# Patient Record
Sex: FEMALE | Race: BLACK OR AFRICAN AMERICAN | NOT HISPANIC OR LATINO
[De-identification: names, ages, dates, MRNs, and addresses within clinical notes are randomized per-mention and may not be internally consistent; named-entity substitution may affect disease eponyms.]

---

## 2017-03-31 ENCOUNTER — TRANSCRIPTION ENCOUNTER (OUTPATIENT)
Age: 27
End: 2017-03-31

## 2019-06-01 ENCOUNTER — OUTPATIENT (OUTPATIENT)
Dept: OUTPATIENT SERVICES | Facility: HOSPITAL | Age: 29
LOS: 1 days | End: 2019-06-01
Payer: MEDICAID

## 2019-06-01 PROCEDURE — G9001: CPT

## 2019-06-03 ENCOUNTER — EMERGENCY (EMERGENCY)
Facility: HOSPITAL | Age: 29
LOS: 1 days | Discharge: HOME | End: 2019-06-03
Attending: EMERGENCY MEDICINE
Payer: MEDICAID

## 2019-06-03 VITALS
HEART RATE: 102 BPM | SYSTOLIC BLOOD PRESSURE: 120 MMHG | TEMPERATURE: 98 F | OXYGEN SATURATION: 99 % | RESPIRATION RATE: 19 BRPM | DIASTOLIC BLOOD PRESSURE: 66 MMHG

## 2019-06-03 VITALS — HEIGHT: 59 IN | WEIGHT: 110.01 LBS

## 2019-06-03 LAB
ALBUMIN SERPL ELPH-MCNC: 3.8 G/DL — SIGNIFICANT CHANGE UP (ref 3.3–5.2)
ALP SERPL-CCNC: 56 U/L — SIGNIFICANT CHANGE UP (ref 40–120)
ALT FLD-CCNC: 57 U/L — HIGH
ANION GAP SERPL CALC-SCNC: 11 MMOL/L — SIGNIFICANT CHANGE UP (ref 5–17)
APPEARANCE UR: CLEAR — SIGNIFICANT CHANGE UP
AST SERPL-CCNC: 28 U/L — SIGNIFICANT CHANGE UP
BACTERIA # UR AUTO: ABNORMAL
BASOPHILS # BLD AUTO: 0 K/UL — SIGNIFICANT CHANGE UP (ref 0–0.2)
BASOPHILS NFR BLD AUTO: 0.3 % — SIGNIFICANT CHANGE UP (ref 0–2)
BILIRUB SERPL-MCNC: 0.2 MG/DL — LOW (ref 0.4–2)
BILIRUB UR-MCNC: NEGATIVE — SIGNIFICANT CHANGE UP
BUN SERPL-MCNC: 18 MG/DL — SIGNIFICANT CHANGE UP (ref 8–20)
CALCIUM SERPL-MCNC: 9.2 MG/DL — SIGNIFICANT CHANGE UP (ref 8.6–10.2)
CHLORIDE SERPL-SCNC: 102 MMOL/L — SIGNIFICANT CHANGE UP (ref 98–107)
CO2 SERPL-SCNC: 26 MMOL/L — SIGNIFICANT CHANGE UP (ref 22–29)
COLOR SPEC: YELLOW — SIGNIFICANT CHANGE UP
CREAT SERPL-MCNC: 0.65 MG/DL — SIGNIFICANT CHANGE UP (ref 0.5–1.3)
DIFF PNL FLD: ABNORMAL
EOSINOPHIL # BLD AUTO: 0.2 K/UL — SIGNIFICANT CHANGE UP (ref 0–0.5)
EOSINOPHIL NFR BLD AUTO: 1.2 % — SIGNIFICANT CHANGE UP (ref 0–6)
EPI CELLS # UR: ABNORMAL
GLUCOSE SERPL-MCNC: 217 MG/DL — HIGH (ref 70–115)
GLUCOSE UR QL: 100 MG/DL
HCG SERPL-ACNC: <4 MIU/ML — SIGNIFICANT CHANGE UP
HCT VFR BLD CALC: 36.9 % — LOW (ref 37–47)
HGB BLD-MCNC: 12.8 G/DL — SIGNIFICANT CHANGE UP (ref 12–16)
KETONES UR-MCNC: ABNORMAL
LEUKOCYTE ESTERASE UR-ACNC: ABNORMAL
LIDOCAIN IGE QN: 24 U/L — SIGNIFICANT CHANGE UP (ref 22–51)
LYMPHOCYTES # BLD AUTO: 24.9 % — SIGNIFICANT CHANGE UP (ref 20–55)
LYMPHOCYTES # BLD AUTO: 3.2 K/UL — SIGNIFICANT CHANGE UP (ref 1–4.8)
MCHC RBC-ENTMCNC: 29.8 PG — SIGNIFICANT CHANGE UP (ref 27–31)
MCHC RBC-ENTMCNC: 34.7 G/DL — SIGNIFICANT CHANGE UP (ref 32–36)
MCV RBC AUTO: 85.8 FL — SIGNIFICANT CHANGE UP (ref 81–99)
MONOCYTES # BLD AUTO: 0.9 K/UL — HIGH (ref 0–0.8)
MONOCYTES NFR BLD AUTO: 6.8 % — SIGNIFICANT CHANGE UP (ref 3–10)
NEUTROPHILS # BLD AUTO: 8.6 K/UL — HIGH (ref 1.8–8)
NEUTROPHILS NFR BLD AUTO: 66.6 % — SIGNIFICANT CHANGE UP (ref 37–73)
NITRITE UR-MCNC: NEGATIVE — SIGNIFICANT CHANGE UP
PH UR: 5 — SIGNIFICANT CHANGE UP (ref 5–8)
PLATELET # BLD AUTO: 334 K/UL — SIGNIFICANT CHANGE UP (ref 150–400)
POTASSIUM SERPL-MCNC: 3.7 MMOL/L — SIGNIFICANT CHANGE UP (ref 3.5–5.3)
POTASSIUM SERPL-SCNC: 3.7 MMOL/L — SIGNIFICANT CHANGE UP (ref 3.5–5.3)
PROT SERPL-MCNC: 6.7 G/DL — SIGNIFICANT CHANGE UP (ref 6.6–8.7)
PROT UR-MCNC: 100 MG/DL
RBC # BLD: 4.3 M/UL — LOW (ref 4.4–5.2)
RBC # FLD: 12.4 % — SIGNIFICANT CHANGE UP (ref 11–15.6)
RBC CASTS # UR COMP ASSIST: ABNORMAL /HPF (ref 0–4)
SODIUM SERPL-SCNC: 139 MMOL/L — SIGNIFICANT CHANGE UP (ref 135–145)
SP GR SPEC: 1.02 — SIGNIFICANT CHANGE UP (ref 1.01–1.02)
UROBILINOGEN FLD QL: NEGATIVE MG/DL — SIGNIFICANT CHANGE UP
WBC # BLD: 13 K/UL — HIGH (ref 4.8–10.8)
WBC # FLD AUTO: 13 K/UL — HIGH (ref 4.8–10.8)
WBC UR QL: ABNORMAL

## 2019-06-03 PROCEDURE — 99284 EMERGENCY DEPT VISIT MOD MDM: CPT | Mod: 25

## 2019-06-03 PROCEDURE — 80053 COMPREHEN METABOLIC PANEL: CPT

## 2019-06-03 PROCEDURE — 83690 ASSAY OF LIPASE: CPT

## 2019-06-03 PROCEDURE — 76775 US EXAM ABDO BACK WALL LIM: CPT

## 2019-06-03 PROCEDURE — 87086 URINE CULTURE/COLONY COUNT: CPT

## 2019-06-03 PROCEDURE — 96374 THER/PROPH/DIAG INJ IV PUSH: CPT

## 2019-06-03 PROCEDURE — 93010 ELECTROCARDIOGRAM REPORT: CPT

## 2019-06-03 PROCEDURE — 76775 US EXAM ABDO BACK WALL LIM: CPT | Mod: 26

## 2019-06-03 PROCEDURE — 81001 URINALYSIS AUTO W/SCOPE: CPT

## 2019-06-03 PROCEDURE — 85027 COMPLETE CBC AUTOMATED: CPT

## 2019-06-03 PROCEDURE — 84702 CHORIONIC GONADOTROPIN TEST: CPT

## 2019-06-03 PROCEDURE — 93005 ELECTROCARDIOGRAM TRACING: CPT

## 2019-06-03 PROCEDURE — 36415 COLL VENOUS BLD VENIPUNCTURE: CPT

## 2019-06-03 RX ORDER — KETOROLAC TROMETHAMINE 30 MG/ML
30 SYRINGE (ML) INJECTION ONCE
Refills: 0 | Status: DISCONTINUED | OUTPATIENT
Start: 2019-06-03 | End: 2019-06-03

## 2019-06-03 RX ORDER — SODIUM CHLORIDE 9 MG/ML
1000 INJECTION INTRAMUSCULAR; INTRAVENOUS; SUBCUTANEOUS ONCE
Refills: 0 | Status: COMPLETED | OUTPATIENT
Start: 2019-06-03 | End: 2019-06-03

## 2019-06-03 RX ORDER — METHOCARBAMOL 500 MG/1
2 TABLET, FILM COATED ORAL
Qty: 12 | Refills: 0
Start: 2019-06-03 | End: 2019-06-04

## 2019-06-03 RX ORDER — CEPHALEXIN 500 MG
1 CAPSULE ORAL
Qty: 28 | Refills: 0
Start: 2019-06-03 | End: 2019-06-09

## 2019-06-03 RX ORDER — ACETAMINOPHEN 500 MG
650 TABLET ORAL ONCE
Refills: 0 | Status: COMPLETED | OUTPATIENT
Start: 2019-06-03 | End: 2019-06-03

## 2019-06-03 RX ORDER — CEPHALEXIN 500 MG
500 CAPSULE ORAL ONCE
Refills: 0 | Status: DISCONTINUED | OUTPATIENT
Start: 2019-06-03 | End: 2019-06-08

## 2019-06-03 RX ORDER — METHOCARBAMOL 500 MG/1
750 TABLET, FILM COATED ORAL ONCE
Refills: 0 | Status: COMPLETED | OUTPATIENT
Start: 2019-06-03 | End: 2019-06-03

## 2019-06-03 RX ADMIN — METHOCARBAMOL 750 MILLIGRAM(S): 500 TABLET, FILM COATED ORAL at 05:23

## 2019-06-03 RX ADMIN — Medication 30 MILLIGRAM(S): at 05:23

## 2019-06-03 RX ADMIN — SODIUM CHLORIDE 1000 MILLILITER(S): 9 INJECTION INTRAMUSCULAR; INTRAVENOUS; SUBCUTANEOUS at 06:00

## 2019-06-03 RX ADMIN — Medication 650 MILLIGRAM(S): at 05:23

## 2019-06-03 NOTE — ED PROVIDER NOTE - PROGRESS NOTE DETAILS
KIMI Becker NOTE: Reviewed EKG with Dr. Powell, sinus tachycardia. KIMI Becker NOTE: UA +uti otherwise Unremarkable labs, US renal negative, d/w Dr. Poewll, will d/c home with keflex and PMD f/u. KIMI Becker NOTE: Patient stable for discharge, reports improvement in pain, vital signs stable, tolerating PO, ambulatory in ED.  Discussion with pt includes but is not limited to: results, plan, proper medication use and side effects, and return precautions. Patient will follow up with their PMD in 1-2 days. Advised patient to seek immediate medical attention for any new/worsening symptoms or concerns.  Patient provided with ample opportunity to ask questions which were answered to the fullest extent.  Patient verbalized understanding and agreement of plan.

## 2019-06-03 NOTE — ED PROVIDER NOTE - OBJECTIVE STATEMENT
30 y/o F with PMHx DM (on insulin pump) presents to ED c/o sharp back pains that started this morning and have been getting progressively worse.  Back pain is located in lower back b/l (R>L) and radiates to right abdomen. Denies associated symptoms. Pt reports hx of back pain but states this feels different.  Pt denies heavy lifting, denies cardiac hx. Denies injury or fall. Took motrin at 8pm with little relief.  LMP 2 wks ago, denies chance of pregnancy. No fever/chills, CP, SOB, palpitations, hematuria, dysuria, NVD, vaginal bleeding/discharge, pelvic pain, bowel/bladder incontinence.

## 2019-06-03 NOTE — ED PROVIDER NOTE - PHYSICAL EXAMINATION
Vital signs noted, see flowsheet.  General: Well nourished/developed. In no acute distress, well appearing and non-toxic.  HEENT: Normocephalic/atraumatic.  Moist mucous membranes. Conjunctiva and sclera clear b/l.  EOMI. PERRL.  Neck: Soft and supple, full ROM without pain.  Cardiac: Regular rate and rhythm. +S1/S2. Peripheral pulses 2+ and symmetric b/l.   Respiratory: Speaking in full sentences, no evidence of respiratory distress. Lungs clear to ascultation b/l, no wheezes/rhonchi/rales/stridor.   Abdomen: Normoactive bowel sounds x 4 quadrants. Soft, non-tender, non-distended. No guarding or rebound tenderness. No suprapubic tenderness. Insulin pump in place on abdomen.   Back: Spine midline and non-tender. No CVAT. MILD RIGHT FLANK TTP, left flank non-ttp.  Skin: Normal color for race, no evidence of rash, ecchymosis, cyanosis or jaundice.   Neuro: Awake, alert and oriented to person/place/time/situation.  GCS 15.  Speech clear, no focal deficits, Follows commands appropriately.  Sensation intact b/l no deficits,  strong and equal.  Strength 5/5 symmetrical upper/lower extremities.  CN II-XII intact, No ataxic gait

## 2019-06-03 NOTE — ED ADULT TRIAGE NOTE - CHIEF COMPLAINT QUOTE
"my back is hurting" "there's sharp pains" c/o back pain worsening this AM. Last dose ibuprofen @ approx 2000 with no relief. Elevated HR noted, pt denies cardiac noted. denies cp denies sob. Able to ambulate with steady gait noted

## 2019-06-03 NOTE — ED PROVIDER NOTE - CLINICAL SUMMARY MEDICAL DECISION MAKING FREE TEXT BOX
30 y/o F with back pain radiating to abd, tachycardic  -Will give analgesic, labs, UA, fluids  -Will follow up and re-evaluate patient

## 2019-06-03 NOTE — ED PROVIDER NOTE - ATTENDING CONTRIBUTION TO CARE
I personally saw the patient with the PA, and completed the key components of the history and physical exam. I then discussed the management plan with the PA.   gen in nad resp clear cardiac no murmur abd no ant 4 quadrant ttp + ttp right flank without g/r/r no cvat neuro intact   agree with pa plan of care

## 2019-06-03 NOTE — ED ADULT NURSE NOTE - OBJECTIVE STATEMENT
Patient Presented to ed secondary to back pain. as per patient she is following up with her orthopedic. no neuro defect noted denies head ach nausea and vomiting.

## 2019-06-03 NOTE — ED PROVIDER NOTE - CHPI ED SYMPTOMS NEG
no neck tenderness/no motor function loss/no numbness/no tingling/no fatigue/no bowel dysfunction/no constipation/no difficulty bearing weight/no bladder dysfunction

## 2019-06-04 ENCOUNTER — INPATIENT (INPATIENT)
Facility: HOSPITAL | Age: 29
LOS: 1 days | Discharge: HOME | DRG: 552 | End: 2019-06-06
Attending: INTERNAL MEDICINE | Admitting: INTERNAL MEDICINE
Payer: MEDICAID

## 2019-06-04 VITALS
SYSTOLIC BLOOD PRESSURE: 134 MMHG | DIASTOLIC BLOOD PRESSURE: 92 MMHG | RESPIRATION RATE: 16 BRPM | HEIGHT: 59 IN | OXYGEN SATURATION: 98 % | HEART RATE: 120 BPM | WEIGHT: 110.01 LBS | TEMPERATURE: 98 F

## 2019-06-04 DIAGNOSIS — E11.9 TYPE 2 DIABETES MELLITUS WITHOUT COMPLICATIONS: ICD-10-CM

## 2019-06-04 DIAGNOSIS — M54.5 LOW BACK PAIN: ICD-10-CM

## 2019-06-04 DIAGNOSIS — E11.65 TYPE 2 DIABETES MELLITUS WITH HYPERGLYCEMIA: ICD-10-CM

## 2019-06-04 DIAGNOSIS — N91.5 OLIGOMENORRHEA, UNSPECIFIED: ICD-10-CM

## 2019-06-04 DIAGNOSIS — M54.9 DORSALGIA, UNSPECIFIED: ICD-10-CM

## 2019-06-04 LAB
ALBUMIN SERPL ELPH-MCNC: 3.7 G/DL — SIGNIFICANT CHANGE UP (ref 3.3–5)
ALP SERPL-CCNC: 50 U/L — SIGNIFICANT CHANGE UP (ref 40–120)
ALT FLD-CCNC: 49 U/L — HIGH (ref 10–45)
ANION GAP SERPL CALC-SCNC: 11 MMOL/L — SIGNIFICANT CHANGE UP (ref 5–17)
APPEARANCE UR: ABNORMAL
APPEARANCE UR: CLEAR — SIGNIFICANT CHANGE UP
AST SERPL-CCNC: 17 U/L — SIGNIFICANT CHANGE UP (ref 10–40)
BACTERIA # UR AUTO: ABNORMAL
BACTERIA # UR AUTO: ABNORMAL
BASOPHILS # BLD AUTO: 0.1 K/UL — SIGNIFICANT CHANGE UP (ref 0–0.2)
BASOPHILS NFR BLD AUTO: 0.8 % — SIGNIFICANT CHANGE UP (ref 0–2)
BILIRUB SERPL-MCNC: 0.3 MG/DL — SIGNIFICANT CHANGE UP (ref 0.2–1.2)
BILIRUB UR-MCNC: NEGATIVE — SIGNIFICANT CHANGE UP
BILIRUB UR-MCNC: NEGATIVE — SIGNIFICANT CHANGE UP
BUN SERPL-MCNC: 10 MG/DL — SIGNIFICANT CHANGE UP (ref 7–23)
CALCIUM SERPL-MCNC: 9.1 MG/DL — SIGNIFICANT CHANGE UP (ref 8.4–10.5)
CHLORIDE SERPL-SCNC: 99 MMOL/L — SIGNIFICANT CHANGE UP (ref 96–108)
CO2 SERPL-SCNC: 23 MMOL/L — SIGNIFICANT CHANGE UP (ref 22–31)
COLOR SPEC: COLORLESS — SIGNIFICANT CHANGE UP
COLOR SPEC: SIGNIFICANT CHANGE UP
CREAT SERPL-MCNC: 0.55 MG/DL — SIGNIFICANT CHANGE UP (ref 0.5–1.3)
CRP SERPL-MCNC: 0.44 MG/DL — HIGH (ref 0–0.4)
CULTURE RESULTS: SIGNIFICANT CHANGE UP
DIFF PNL FLD: NEGATIVE — SIGNIFICANT CHANGE UP
DIFF PNL FLD: NEGATIVE — SIGNIFICANT CHANGE UP
EOSINOPHIL # BLD AUTO: 0.1 K/UL — SIGNIFICANT CHANGE UP (ref 0–0.5)
EOSINOPHIL NFR BLD AUTO: 1 % — SIGNIFICANT CHANGE UP (ref 0–6)
EPI CELLS # UR: 32 /HPF — HIGH
EPI CELLS # UR: 6 /HPF — HIGH
ERYTHROCYTE [SEDIMENTATION RATE] IN BLOOD: 13 MM/HR — SIGNIFICANT CHANGE UP (ref 0–15)
GAS PNL BLDV: SIGNIFICANT CHANGE UP
GLUCOSE BLDC GLUCOMTR-MCNC: 192 MG/DL — HIGH (ref 70–99)
GLUCOSE BLDC GLUCOMTR-MCNC: 213 MG/DL — HIGH (ref 70–99)
GLUCOSE BLDC GLUCOMTR-MCNC: 240 MG/DL — HIGH (ref 70–99)
GLUCOSE BLDC GLUCOMTR-MCNC: 257 MG/DL — HIGH (ref 70–99)
GLUCOSE SERPL-MCNC: 415 MG/DL — HIGH (ref 70–99)
GLUCOSE UR QL: ABNORMAL
GLUCOSE UR QL: ABNORMAL
HCT VFR BLD CALC: 41 % — SIGNIFICANT CHANGE UP (ref 34.5–45)
HGB BLD-MCNC: 13.3 G/DL — SIGNIFICANT CHANGE UP (ref 11.5–15.5)
HYALINE CASTS # UR AUTO: 4 /LPF — HIGH (ref 0–2)
HYALINE CASTS # UR AUTO: 5 /LPF — HIGH (ref 0–2)
KETONES UR-MCNC: NEGATIVE — SIGNIFICANT CHANGE UP
KETONES UR-MCNC: NEGATIVE — SIGNIFICANT CHANGE UP
LEUKOCYTE ESTERASE UR-ACNC: ABNORMAL
LEUKOCYTE ESTERASE UR-ACNC: NEGATIVE — SIGNIFICANT CHANGE UP
LYMPHOCYTES # BLD AUTO: 2.3 K/UL — SIGNIFICANT CHANGE UP (ref 1–3.3)
LYMPHOCYTES # BLD AUTO: 28 % — SIGNIFICANT CHANGE UP (ref 13–44)
MCHC RBC-ENTMCNC: 29.2 PG — SIGNIFICANT CHANGE UP (ref 27–34)
MCHC RBC-ENTMCNC: 32.4 GM/DL — SIGNIFICANT CHANGE UP (ref 32–36)
MCV RBC AUTO: 90.3 FL — SIGNIFICANT CHANGE UP (ref 80–100)
MONOCYTES # BLD AUTO: 0.6 K/UL — SIGNIFICANT CHANGE UP (ref 0–0.9)
MONOCYTES NFR BLD AUTO: 7.8 % — SIGNIFICANT CHANGE UP (ref 2–14)
NEUTROPHILS # BLD AUTO: 5.2 K/UL — SIGNIFICANT CHANGE UP (ref 1.8–7.4)
NEUTROPHILS NFR BLD AUTO: 62.5 % — SIGNIFICANT CHANGE UP (ref 43–77)
NITRITE UR-MCNC: NEGATIVE — SIGNIFICANT CHANGE UP
NITRITE UR-MCNC: NEGATIVE — SIGNIFICANT CHANGE UP
PH UR: 6 — SIGNIFICANT CHANGE UP (ref 5–8)
PH UR: 6 — SIGNIFICANT CHANGE UP (ref 5–8)
PLATELET # BLD AUTO: 325 K/UL — SIGNIFICANT CHANGE UP (ref 150–400)
POTASSIUM SERPL-MCNC: 3.7 MMOL/L — SIGNIFICANT CHANGE UP (ref 3.5–5.3)
POTASSIUM SERPL-SCNC: 3.7 MMOL/L — SIGNIFICANT CHANGE UP (ref 3.5–5.3)
PROT SERPL-MCNC: 6.6 G/DL — SIGNIFICANT CHANGE UP (ref 6–8.3)
PROT UR-MCNC: ABNORMAL
PROT UR-MCNC: SIGNIFICANT CHANGE UP
RBC # BLD: 4.54 M/UL — SIGNIFICANT CHANGE UP (ref 3.8–5.2)
RBC # FLD: 11.3 % — SIGNIFICANT CHANGE UP (ref 10.3–14.5)
RBC CASTS # UR COMP ASSIST: 1 /HPF — SIGNIFICANT CHANGE UP (ref 0–4)
RBC CASTS # UR COMP ASSIST: 2 /HPF — SIGNIFICANT CHANGE UP (ref 0–4)
SODIUM SERPL-SCNC: 133 MMOL/L — LOW (ref 135–145)
SP GR SPEC: 1.02 — SIGNIFICANT CHANGE UP (ref 1.01–1.02)
SP GR SPEC: 1.02 — SIGNIFICANT CHANGE UP (ref 1.01–1.02)
SPECIMEN SOURCE: SIGNIFICANT CHANGE UP
UROBILINOGEN FLD QL: NEGATIVE — SIGNIFICANT CHANGE UP
UROBILINOGEN FLD QL: NEGATIVE — SIGNIFICANT CHANGE UP
WBC # BLD: 8.3 K/UL — SIGNIFICANT CHANGE UP (ref 3.8–10.5)
WBC # FLD AUTO: 8.3 K/UL — SIGNIFICANT CHANGE UP (ref 3.8–10.5)
WBC UR QL: 4 /HPF — SIGNIFICANT CHANGE UP (ref 0–5)
WBC UR QL: 80 /HPF — HIGH (ref 0–5)

## 2019-06-04 PROCEDURE — 99222 1ST HOSP IP/OBS MODERATE 55: CPT

## 2019-06-04 PROCEDURE — 99285 EMERGENCY DEPT VISIT HI MDM: CPT | Mod: 25

## 2019-06-04 PROCEDURE — 12345: CPT | Mod: NC

## 2019-06-04 RX ORDER — DEXTROSE 50 % IN WATER 50 %
12.5 SYRINGE (ML) INTRAVENOUS ONCE
Refills: 0 | Status: DISCONTINUED | OUTPATIENT
Start: 2019-06-04 | End: 2019-06-06

## 2019-06-04 RX ORDER — DEXTROSE 50 % IN WATER 50 %
25 SYRINGE (ML) INTRAVENOUS ONCE
Refills: 0 | Status: DISCONTINUED | OUTPATIENT
Start: 2019-06-04 | End: 2019-06-06

## 2019-06-04 RX ORDER — INSULIN LISPRO 100/ML
VIAL (ML) SUBCUTANEOUS
Refills: 0 | Status: DISCONTINUED | OUTPATIENT
Start: 2019-06-04 | End: 2019-06-06

## 2019-06-04 RX ORDER — HEPARIN SODIUM 5000 [USP'U]/ML
5000 INJECTION INTRAVENOUS; SUBCUTANEOUS EVERY 12 HOURS
Refills: 0 | Status: DISCONTINUED | OUTPATIENT
Start: 2019-06-04 | End: 2019-06-06

## 2019-06-04 RX ORDER — MORPHINE SULFATE 50 MG/1
4 CAPSULE, EXTENDED RELEASE ORAL ONCE
Refills: 0 | Status: DISCONTINUED | OUTPATIENT
Start: 2019-06-04 | End: 2019-06-04

## 2019-06-04 RX ORDER — ACETAMINOPHEN 500 MG
975 TABLET ORAL ONCE
Refills: 0 | Status: COMPLETED | OUTPATIENT
Start: 2019-06-04 | End: 2019-06-04

## 2019-06-04 RX ORDER — INSULIN LISPRO 100/ML
10 VIAL (ML) SUBCUTANEOUS ONCE
Refills: 0 | Status: DISCONTINUED | OUTPATIENT
Start: 2019-06-04 | End: 2019-06-04

## 2019-06-04 RX ORDER — HUMAN INSULIN 100 [IU]/ML
4 INJECTION, SUSPENSION SUBCUTANEOUS ONCE
Refills: 0 | Status: COMPLETED | OUTPATIENT
Start: 2019-06-04 | End: 2019-06-04

## 2019-06-04 RX ORDER — KETOROLAC TROMETHAMINE 30 MG/ML
15 SYRINGE (ML) INJECTION ONCE
Refills: 0 | Status: DISCONTINUED | OUTPATIENT
Start: 2019-06-04 | End: 2019-06-04

## 2019-06-04 RX ORDER — DEXTROSE 50 % IN WATER 50 %
15 SYRINGE (ML) INTRAVENOUS ONCE
Refills: 0 | Status: DISCONTINUED | OUTPATIENT
Start: 2019-06-04 | End: 2019-06-06

## 2019-06-04 RX ORDER — METHOCARBAMOL 500 MG/1
750 TABLET, FILM COATED ORAL
Refills: 0 | Status: DISCONTINUED | OUTPATIENT
Start: 2019-06-04 | End: 2019-06-06

## 2019-06-04 RX ORDER — ACETAMINOPHEN 500 MG
750 TABLET ORAL ONCE
Refills: 0 | Status: COMPLETED | OUTPATIENT
Start: 2019-06-04 | End: 2019-06-04

## 2019-06-04 RX ORDER — INSULIN GLARGINE 100 [IU]/ML
10 INJECTION, SOLUTION SUBCUTANEOUS AT BEDTIME
Refills: 0 | Status: DISCONTINUED | OUTPATIENT
Start: 2019-06-04 | End: 2019-06-06

## 2019-06-04 RX ORDER — GLUCAGON INJECTION, SOLUTION 0.5 MG/.1ML
1 INJECTION, SOLUTION SUBCUTANEOUS ONCE
Refills: 0 | Status: DISCONTINUED | OUTPATIENT
Start: 2019-06-04 | End: 2019-06-06

## 2019-06-04 RX ORDER — INSULIN LISPRO 100/ML
VIAL (ML) SUBCUTANEOUS AT BEDTIME
Refills: 0 | Status: DISCONTINUED | OUTPATIENT
Start: 2019-06-04 | End: 2019-06-06

## 2019-06-04 RX ORDER — LIDOCAINE 4 G/100G
1 CREAM TOPICAL ONCE
Refills: 0 | Status: COMPLETED | OUTPATIENT
Start: 2019-06-04 | End: 2019-06-04

## 2019-06-04 RX ORDER — TRAMADOL HYDROCHLORIDE 50 MG/1
50 TABLET ORAL EVERY 6 HOURS
Refills: 0 | Status: DISCONTINUED | OUTPATIENT
Start: 2019-06-04 | End: 2019-06-06

## 2019-06-04 RX ORDER — SODIUM CHLORIDE 9 MG/ML
1000 INJECTION INTRAMUSCULAR; INTRAVENOUS; SUBCUTANEOUS ONCE
Refills: 0 | Status: COMPLETED | OUTPATIENT
Start: 2019-06-04 | End: 2019-06-04

## 2019-06-04 RX ORDER — SODIUM CHLORIDE 9 MG/ML
1000 INJECTION, SOLUTION INTRAVENOUS
Refills: 0 | Status: DISCONTINUED | OUTPATIENT
Start: 2019-06-04 | End: 2019-06-06

## 2019-06-04 RX ORDER — INSULIN LISPRO 100/ML
3 VIAL (ML) SUBCUTANEOUS
Refills: 0 | Status: DISCONTINUED | OUTPATIENT
Start: 2019-06-04 | End: 2019-06-06

## 2019-06-04 RX ADMIN — Medication 2: at 18:56

## 2019-06-04 RX ADMIN — Medication 3 UNIT(S): at 18:56

## 2019-06-04 RX ADMIN — INSULIN GLARGINE 10 UNIT(S): 100 INJECTION, SOLUTION SUBCUTANEOUS at 22:38

## 2019-06-04 RX ADMIN — TRAMADOL HYDROCHLORIDE 50 MILLIGRAM(S): 50 TABLET ORAL at 19:00

## 2019-06-04 RX ADMIN — Medication 750 MILLIGRAM(S): at 21:36

## 2019-06-04 RX ADMIN — MORPHINE SULFATE 4 MILLIGRAM(S): 50 CAPSULE, EXTENDED RELEASE ORAL at 06:43

## 2019-06-04 RX ADMIN — Medication 2: at 11:21

## 2019-06-04 RX ADMIN — SODIUM CHLORIDE 1000 MILLILITER(S): 9 INJECTION INTRAMUSCULAR; INTRAVENOUS; SUBCUTANEOUS at 03:16

## 2019-06-04 RX ADMIN — Medication 975 MILLIGRAM(S): at 03:46

## 2019-06-04 RX ADMIN — Medication 300 MILLIGRAM(S): at 20:26

## 2019-06-04 RX ADMIN — LIDOCAINE 1 PATCH: 4 CREAM TOPICAL at 04:03

## 2019-06-04 RX ADMIN — TRAMADOL HYDROCHLORIDE 50 MILLIGRAM(S): 50 TABLET ORAL at 23:43

## 2019-06-04 RX ADMIN — HUMAN INSULIN 4 UNIT(S): 100 INJECTION, SUSPENSION SUBCUTANEOUS at 12:41

## 2019-06-04 RX ADMIN — LIDOCAINE 1 PATCH: 4 CREAM TOPICAL at 17:09

## 2019-06-04 RX ADMIN — SODIUM CHLORIDE 1000 MILLILITER(S): 9 INJECTION INTRAMUSCULAR; INTRAVENOUS; SUBCUTANEOUS at 06:43

## 2019-06-04 RX ADMIN — HEPARIN SODIUM 5000 UNIT(S): 5000 INJECTION INTRAVENOUS; SUBCUTANEOUS at 17:18

## 2019-06-04 RX ADMIN — TRAMADOL HYDROCHLORIDE 50 MILLIGRAM(S): 50 TABLET ORAL at 18:28

## 2019-06-04 RX ADMIN — Medication 15 MILLIGRAM(S): at 06:43

## 2019-06-04 NOTE — CONSULT NOTE ADULT - SUBJECTIVE AND OBJECTIVE BOX
HPI: 28 yo female with hx of ketosis-prone dm uncontrolled (managed with Omnipod) without any known complications x 7 years without complication here with 1 day of intractable 10/10 lower back pain which is not worse with anything or better with anything. Per chart she has been seen in other EDs for the same problem and was previously diagnosed with pyelonephritis and hemorraghic cystitis.   She follows with endocrinologist Dr. Horowitz. She was diagnosed on routine labs 7 years ago and was hospitalized with DKA only once a year later. She was told that her ab were negative. She has been on an omnipod for the past 2 months. She was due to change one day ago but her pain was so great that she did not put a new POD on. She does not know her settings and left her PDM at home. She also takes 1/2 tablet of metfromin 1000mg po bid. She has never taken any other oral agents. She has also never used a CGM      PAST MEDICAL & SURGICAL HISTORY:  Diabetes mellitus      FAMILY HISTORY:  DM: mother, paternal aunts/uncles    Social History:  Tobacco: denies  ETOH: denies  Occupation:     Outpatient Medications: Humalog per pump    MEDICATIONS  (STANDING):  dextrose 5%. 1000 milliLiter(s) (50 mL/Hr) IV Continuous <Continuous>  dextrose 50% Injectable 12.5 Gram(s) IV Push once  dextrose 50% Injectable 25 Gram(s) IV Push once  dextrose 50% Injectable 25 Gram(s) IV Push once  heparin  Injectable 5000 Unit(s) SubCutaneous every 12 hours  insulin glargine Injectable (LANTUS) 10 Unit(s) SubCutaneous at bedtime  insulin lispro (HumaLOG) corrective regimen sliding scale   SubCutaneous three times a day before meals  insulin lispro (HumaLOG) corrective regimen sliding scale   SubCutaneous at bedtime  insulin lispro Injectable (HumaLOG) 3 Unit(s) SubCutaneous three times a day before meals  methocarbamol 750 milliGRAM(s) Oral two times a day    MEDICATIONS  (PRN):  dextrose 40% Gel 15 Gram(s) Oral once PRN Blood Glucose LESS THAN 70 milliGRAM(s)/deciliter  glucagon  Injectable 1 milliGRAM(s) IntraMuscular once PRN Glucose LESS THAN 70 milligrams/deciliter  traMADol 50 milliGRAM(s) Oral every 6 hours PRN Moderate Pain (4 - 6)      Allergies  No Known Allergies      Review of Systems:  Constitutional: No fever/chills  Eyes: No blurry vision, diplopia  Cardiovascular: No chest pain, palpitations  Respiratory: No SOB, no cough  GI: No nausea, vomiting,   : No dysuria, hematuria  ENDO: +irregular menses, hirsutism of chin  ALL OTHER SYSTEMS REVIEWED AND NEGATIVE      PHYSICAL EXAM:  VITALS: T(C): 36.8 (06-04-19 @ 16:32)  T(F): 98.2 (06-04-19 @ 16:32), Max: 98.7 (06-04-19 @ 15:47)  HR: 110 (06-04-19 @ 16:32) (109 - 120)  BP: 141/91 (06-04-19 @ 16:32) (123/80 - 141/91)  RR:  (16 - 18)  SpO2:  (98% - 100%)  Wt(kg): --  GENERAL: NAD, well-groomed, well-developed  EYES: No proptosis, anicteric  HEENT:  Atraumatic, Normocephalic, moist mucous membranes  THYROID: Normal size, no palpable nodules  RESPIRATORY: Clear to auscultation bilaterally; No rales, rhonchi, wheezing, or rubs  CARDIOVASCULAR: Regular rate and rhythm; No murmurs; no peripheral edema  GI: Soft, nontender, non distended, normal bowel sounds  SKIN: Dry, intact, No rashes or lesions on feet b/l, +AN on neck, hyperpigmented areas on face from acne  PSYCH: reactive affect, euthymic mood      POCT Blood Glucose.: 213 mg/dL (06-04-19 @ 18:01)  POCT Blood Glucose.: 257 mg/dL (06-04-19 @ 12:39)  POCT Blood Glucose.: 240 mg/dL (06-04-19 @ 11:05)  POCT Blood Glucose.: 272 mg/dL (06-04-19 @ 08:13)  POCT Blood Glucose.: 280 mg/dL (06-04-19 @ 06:51)                            13.3   8.3   )-----------( 325      ( 04 Jun 2019 03:33 )             41.0       06-04    133<L>  |  99  |  10  ----------------------------<  415<H>  3.7   |  23  |  0.55    EGFR if : 147  EGFR if non : 127    Ca    9.1      06-04    TPro  6.6  /  Alb  3.7  /  TBili  0.3  /  DBili  x   /  AST  17  /  ALT  49<H>  /  AlkPhos  50  06-04

## 2019-06-04 NOTE — ED ADULT NURSE REASSESSMENT NOTE - NS ED NURSE REASSESS COMMENT FT1
NP Catherine called at [phone] to discuss insulin regimen for patient. Per NP Catherine will put in sliding scale and come talk to patient to discuss insulin for patient. Safety and comfort measures provided.

## 2019-06-04 NOTE — ED ADULT NURSE REASSESSMENT NOTE - NS ED NURSE REASSESS COMMENT FT1
Report received from NOELLE De Dios. Patient appears to be resting comfortably in stretcher. Patient states "they just gave me pain medication I am starting to get some relief". Patient noted to be tachycardic. ED MD Lake made aware. Patient denies any chest pain, dizziness, n/v/d, numbness, tingling, SOB, abdominal pain. A&OX3. Safety and comfort measures provided. Family at bedside.

## 2019-06-04 NOTE — CONSULT NOTE ADULT - ASSESSMENT
30 yo female with hx of ketosis-prone dm uncontrolled (managed with Omnipod) without any known complications x 7 years without complication here with 1 day of intractable 10/10 lower back pain, endocrine consulted for diabetes mgt.

## 2019-06-04 NOTE — ADVANCED PRACTICE NURSE CONSULT - ASSESSMENT
30 y/o female with PMH T2DM (diagnosed 2 months ago) by Dr. Horowitz (Endocrinology) on Omnipod insulin pump home with no known complications and HLD.  Pt also takes Metformin 1000 mg daily.  Pt states she was tested for antibodies which were negative and T1DM ruled out.  Pt disconnected her insulin pump yesterday because she was due for a POD change but she was in too much pain and couldn’t reapply POD and came to ED.  Pt does not have insulin pump supplies but family can bring in PDM and PODS when pt ready to reattach.  Unable to obtain insulin pump settings and pt does not know rates.

## 2019-06-04 NOTE — ED ADULT NURSE REASSESSMENT NOTE - NS ED NURSE REASSESS COMMENT FT1
Repeat fingerstick completed at this time. . Patient states is normally on insulin pump which she took off at home. ED MD Brooklyn Cohen and ED MD Lake made aware. No intervention at this time. Will continue to monitor. Safety and comfort measures provided. A&OX3.

## 2019-06-04 NOTE — PATIENT PROFILE ADULT - ..
Restraint applied to left arm and leg. Pt. Trying to climb out of bed and kicking at nurse. Notified Linda Hinson NP.    04-Jun-2019 19:22:03

## 2019-06-04 NOTE — ED ADULT NURSE NOTE - OBJECTIVE STATEMENT
28 y/o female with history of DM (on insulin pump), walked into ED a&ox3 c/o back pain. Patient seen in multiple different EDs for same symptoms. Diagnosed with pyelonephritis and hemorraghic cystitis in pas. Coming to ED tonight with continued, worsening lower back pain. Described as "aching" sometimes "sharp" pain, constant, 9/10 unrelieved by medications. Recently place don Naproxen by PCP and has had no relief of symptoms. Denies fall or recent trauma, urinary/bowel incontinence, numbness/tingling, dizziness, abdominal pain, SOB, CP. Lungs clear b/l. Abd soft, nontender, nondistended. BHASKAR iglesias. MD Griggs at bedside for eval. 28 y/o female with history of DM (on insulin pump), walked into ED a&ox3 c/o back pain. Patient seen in multiple different EDs for same symptoms. Diagnosed with pyelonephritis and hemorraghic cystitis in pas. Coming to ED tonight with continued, worsening lower back pain. Described as "aching" sometimes "sharp" pain, constant, 9/10 unrelieved by medications. Recently placed on Naproxen by PCP and has had no relief of symptoms. Also endorses weight loss of 60lbs since July. Denies fall or recent trauma, urinary/bowel incontinence, numbness/tingling, dizziness, abdominal pain, SOB, CP. Lungs clear b/l. Abd soft, nontender, nondistended. BHASKAR hunt4. MD Griggs at bedside for eval.

## 2019-06-04 NOTE — ED PROVIDER NOTE - CLINICAL SUMMARY MEDICAL DECISION MAKING FREE TEXT BOX
low back pain.  similar to prior pain episodes.  pt has had MRI, CT, UA and labs with no diagnosis for cause of pain.  no neuro defects.  will control symptoms

## 2019-06-04 NOTE — CONSULT NOTE ADULT - PROBLEM SELECTOR RECOMMENDATION 9
-check urine pregnancy  -NPH 4 stat given when patient was seen at 12pm  -Lantus 10 units sq qhs  -humalog 3 units tid-ac with small correctional scale tid-ac/qhs  DISPO: f/u with Dr. Horowitz and resume pump upon discharge

## 2019-06-04 NOTE — ED PROVIDER NOTE - PROGRESS NOTE DETAILS
Pt continuing to have pain. glucose elevated as pt has removed her insulin pump since yesterday, did not bring with her, will admin SQ insulin here. Pt still tachycardic, will admin additional fluids, pain control, and reassess vitals -Hugh, pgy2 Carlos James PGY2: pain mildly improved s/p morphine, however still appears uncomfortable, heartrate remains elevated 117, pending urine results - will likely admit for further workup Persistent tachycardia with back pain. Concern for possible remote infection, ESR/CRP added, blood cultures added. Will repeat UA secondary to epithelial cells. Dr. Fisher notified. will follow results.  Will likely need inpatient MRI.

## 2019-06-04 NOTE — ED PROVIDER NOTE - OBJECTIVE STATEMENT
28 y/o F with PMHx DM (on insulin pump), seen in multiple EDs for persistent back pain, diagnosed with pyelonephritis and hemorraghic cystitis in past, dced from [place] [facility] for similar symptoms, had hematuria but normal renal ultrasound, p/w persistent lower back pain since her discharge. Pt continues to deny trauma or strenuous origin. Back pain has not changed in location, is still b/l and in the CVA/lower lumbar region. Denies f/c/n/v/d. 28 y/o F with PMHx DM (on insulin pump), seen in multiple EDs for persistent back pain, diagnosed with pyelonephritis and hemorraghic cystitis in past, dced from [place] [facility] for similar symptoms, had hematuria but normal renal ultrasound, p/w persistent lower back pain since her discharge. Pt continues to deny trauma or strenuous origin. Back pain has not changed in location, is still b/l and in the CVA/lower lumbar region. Denies f/c/n/v/d.    Attendinyo female presents with low back pain which has been an issue for several months.  pain worse with movement.  also worse with standing.

## 2019-06-04 NOTE — ADVANCED PRACTICE NURSE CONSULT - RECOMMEDATIONS
Pt transitioned to ISS, pre-meal inulin, and Lantus insulin.  Endocrine consult pending and to follow.

## 2019-06-04 NOTE — ED PROVIDER NOTE - NS ED MD DISPO ADMITTING SERVICE
Follow-up with German Anton for recheck in 1-2 days.  Continue your baclofen as previously prescribed.  Return if any change or worsening.  
MED

## 2019-06-05 DIAGNOSIS — M54.5 LOW BACK PAIN: ICD-10-CM

## 2019-06-05 LAB
CULTURE RESULTS: SIGNIFICANT CHANGE UP
GLUCOSE BLDC GLUCOMTR-MCNC: 131 MG/DL — HIGH (ref 70–99)
GLUCOSE BLDC GLUCOMTR-MCNC: 137 MG/DL — HIGH (ref 70–99)
GLUCOSE BLDC GLUCOMTR-MCNC: 147 MG/DL — HIGH (ref 70–99)
GLUCOSE BLDC GLUCOMTR-MCNC: 161 MG/DL — HIGH (ref 70–99)
HBA1C BLD-MCNC: 9.5 % — HIGH (ref 4–5.6)
HCG UR QL: NEGATIVE — SIGNIFICANT CHANGE UP
SPECIMEN SOURCE: SIGNIFICANT CHANGE UP

## 2019-06-05 PROCEDURE — 99232 SBSQ HOSP IP/OBS MODERATE 35: CPT

## 2019-06-05 RX ORDER — CYCLOBENZAPRINE HYDROCHLORIDE 10 MG/1
10 TABLET, FILM COATED ORAL THREE TIMES A DAY
Refills: 0 | Status: DISCONTINUED | OUTPATIENT
Start: 2019-06-05 | End: 2019-06-06

## 2019-06-05 RX ORDER — LIDOCAINE 4 G/100G
1 CREAM TOPICAL EVERY 24 HOURS
Refills: 0 | Status: DISCONTINUED | OUTPATIENT
Start: 2019-06-05 | End: 2019-06-06

## 2019-06-05 RX ORDER — ACETAMINOPHEN 500 MG
650 TABLET ORAL EVERY 6 HOURS
Refills: 0 | Status: DISCONTINUED | OUTPATIENT
Start: 2019-06-05 | End: 2019-06-06

## 2019-06-05 RX ADMIN — LIDOCAINE 1 PATCH: 4 CREAM TOPICAL at 06:53

## 2019-06-05 RX ADMIN — CYCLOBENZAPRINE HYDROCHLORIDE 10 MILLIGRAM(S): 10 TABLET, FILM COATED ORAL at 16:54

## 2019-06-05 RX ADMIN — Medication 3 UNIT(S): at 13:16

## 2019-06-05 RX ADMIN — TRAMADOL HYDROCHLORIDE 50 MILLIGRAM(S): 50 TABLET ORAL at 06:23

## 2019-06-05 RX ADMIN — METHOCARBAMOL 750 MILLIGRAM(S): 500 TABLET, FILM COATED ORAL at 18:15

## 2019-06-05 RX ADMIN — LIDOCAINE 1 PATCH: 4 CREAM TOPICAL at 18:05

## 2019-06-05 RX ADMIN — TRAMADOL HYDROCHLORIDE 50 MILLIGRAM(S): 50 TABLET ORAL at 23:47

## 2019-06-05 RX ADMIN — Medication 1: at 17:15

## 2019-06-05 RX ADMIN — Medication 0: at 13:17

## 2019-06-05 RX ADMIN — HEPARIN SODIUM 5000 UNIT(S): 5000 INJECTION INTRAVENOUS; SUBCUTANEOUS at 06:54

## 2019-06-05 RX ADMIN — LIDOCAINE 1 PATCH: 4 CREAM TOPICAL at 07:51

## 2019-06-05 RX ADMIN — TRAMADOL HYDROCHLORIDE 50 MILLIGRAM(S): 50 TABLET ORAL at 16:59

## 2019-06-05 RX ADMIN — METHOCARBAMOL 750 MILLIGRAM(S): 500 TABLET, FILM COATED ORAL at 06:25

## 2019-06-05 RX ADMIN — TRAMADOL HYDROCHLORIDE 50 MILLIGRAM(S): 50 TABLET ORAL at 07:52

## 2019-06-05 RX ADMIN — INSULIN GLARGINE 10 UNIT(S): 100 INJECTION, SOLUTION SUBCUTANEOUS at 22:48

## 2019-06-05 RX ADMIN — TRAMADOL HYDROCHLORIDE 50 MILLIGRAM(S): 50 TABLET ORAL at 00:43

## 2019-06-05 RX ADMIN — Medication 3 UNIT(S): at 09:54

## 2019-06-05 RX ADMIN — HEPARIN SODIUM 5000 UNIT(S): 5000 INJECTION INTRAVENOUS; SUBCUTANEOUS at 18:15

## 2019-06-05 RX ADMIN — Medication 3 UNIT(S): at 17:15

## 2019-06-05 RX ADMIN — TRAMADOL HYDROCHLORIDE 50 MILLIGRAM(S): 50 TABLET ORAL at 18:06

## 2019-06-05 NOTE — PROGRESS NOTE ADULT - ASSESSMENT
28 yo female with hx of ketosis-prone dm uncontrolled (managed with Omnipod) A1C 9.5, X 7 years without complication here with 1 day of intractable 10/10 lower back pain, endocrine consulted for diabetes mgt.   undergoing workup.          # : Type 2 diabetes mellitus with hyperglycemia, ketosis prone, with long-term current use of insulin, on Omnipod at home   -RDE consult inpt  -Lantus 10 units sq qhs  -humalog 3 units tid-ac with small correctional scale tid-ac/qhs  DISPO: f/u with Dr. Horowitz and resume pump upon discharge. I have asked the pt to bring pump supplies.         inform endocrine of hypoglycemia or persistent hyperglycemia episodes as changes in pts insulin regimen will need to be made.   notify endocrine if any plans to be NPO/diet changes as this will also affect insulin regimen.

## 2019-06-05 NOTE — PROGRESS NOTE ADULT - ATTENDING COMMENTS
Maggie Chandra MD  Pager [phone] ([facility])/ [phone] ([facility]) [please provide 10 digit call back number]  Nights and weekends: [phone]  Please note that this patient may be followed by a different provider tomorrow. If no answer or after hours, please contact [phone].  For final dc reccomendations, please call [phone] or page the endocrine fellow on call.

## 2019-06-06 ENCOUNTER — TRANSCRIPTION ENCOUNTER (OUTPATIENT)
Age: 29
End: 2019-06-06

## 2019-06-06 VITALS
HEART RATE: 114 BPM | OXYGEN SATURATION: 98 % | TEMPERATURE: 98 F | DIASTOLIC BLOOD PRESSURE: 99 MMHG | RESPIRATION RATE: 18 BRPM | SYSTOLIC BLOOD PRESSURE: 136 MMHG

## 2019-06-06 LAB
GLUCOSE BLDC GLUCOMTR-MCNC: 163 MG/DL — HIGH (ref 70–99)
GLUCOSE BLDC GLUCOMTR-MCNC: 204 MG/DL — HIGH (ref 70–99)

## 2019-06-06 PROCEDURE — 99232 SBSQ HOSP IP/OBS MODERATE 35: CPT

## 2019-06-06 RX ORDER — DOCUSATE SODIUM 100 MG
100 CAPSULE ORAL
Refills: 0 | Status: DISCONTINUED | OUTPATIENT
Start: 2019-06-06 | End: 2019-06-06

## 2019-06-06 RX ORDER — POLYETHYLENE GLYCOL 3350 17 G/17G
17 POWDER, FOR SOLUTION ORAL ONCE
Refills: 0 | Status: COMPLETED | OUTPATIENT
Start: 2019-06-06 | End: 2019-06-06

## 2019-06-06 RX ORDER — SENNA PLUS 8.6 MG/1
2 TABLET ORAL AT BEDTIME
Refills: 0 | Status: DISCONTINUED | OUTPATIENT
Start: 2019-06-06 | End: 2019-06-06

## 2019-06-06 RX ORDER — POLYETHYLENE GLYCOL 3350 17 G/17G
17 POWDER, FOR SOLUTION ORAL
Qty: 1 | Refills: 0
Start: 2019-06-06

## 2019-06-06 RX ADMIN — TRAMADOL HYDROCHLORIDE 50 MILLIGRAM(S): 50 TABLET ORAL at 07:58

## 2019-06-06 RX ADMIN — CYCLOBENZAPRINE HYDROCHLORIDE 10 MILLIGRAM(S): 10 TABLET, FILM COATED ORAL at 11:25

## 2019-06-06 RX ADMIN — Medication 650 MILLIGRAM(S): at 12:00

## 2019-06-06 RX ADMIN — HEPARIN SODIUM 5000 UNIT(S): 5000 INJECTION INTRAVENOUS; SUBCUTANEOUS at 06:02

## 2019-06-06 RX ADMIN — Medication 3 UNIT(S): at 13:12

## 2019-06-06 RX ADMIN — LIDOCAINE 1 PATCH: 4 CREAM TOPICAL at 06:34

## 2019-06-06 RX ADMIN — Medication 1: at 08:46

## 2019-06-06 RX ADMIN — LIDOCAINE 1 PATCH: 4 CREAM TOPICAL at 06:01

## 2019-06-06 RX ADMIN — TRAMADOL HYDROCHLORIDE 50 MILLIGRAM(S): 50 TABLET ORAL at 00:47

## 2019-06-06 RX ADMIN — Medication 3 UNIT(S): at 08:46

## 2019-06-06 RX ADMIN — METHOCARBAMOL 750 MILLIGRAM(S): 500 TABLET, FILM COATED ORAL at 06:02

## 2019-06-06 RX ADMIN — SENNA PLUS 2 TABLET(S): 8.6 TABLET ORAL at 06:18

## 2019-06-06 RX ADMIN — Medication 650 MILLIGRAM(S): at 11:25

## 2019-06-06 RX ADMIN — Medication 2: at 13:12

## 2019-06-06 RX ADMIN — TRAMADOL HYDROCHLORIDE 50 MILLIGRAM(S): 50 TABLET ORAL at 08:30

## 2019-06-06 NOTE — DISCHARGE NOTE PROVIDER - HOSPITAL COURSE
28 yo female Left-sided low back pain without sciatica, unspecified chronicity.      Trigger point identified. Needs out patient pain management FU.         Type 2 diabetes mellitus with hyperglycemia, ketosis prone, with long-term current use of insulin, on Omnipod at home     -RDE consult inpt    DISPO: f/u with Dr. Horowitz and resume pump upon discharge     Spoke with patient regarding resuming pump for safe discharge    DCP with med rec discussed with Dr Juarez PT is cleared for dc home no skilled needs     Follow up with Dr emery and Dr Horowitz in one week endocrine follow up

## 2019-06-06 NOTE — PROGRESS NOTE ADULT - PROBLEM SELECTOR PLAN 2
Endocrine eval noted.
Endocrine eval noted
workup outpt with endocrine and OBGYN
workup outpt with endocrine and OBGYN

## 2019-06-06 NOTE — PHYSICAL THERAPY INITIAL EVALUATION ADULT - ACTIVE RANGE OF MOTION EXAMINATION, REHAB EVAL
bilateral lower extremity Active ROM was WNL (within normal limits)/edda. upper extremity Active ROM was WNL (within normal limits)

## 2019-06-06 NOTE — PHYSICAL THERAPY INITIAL EVALUATION ADULT - PERTINENT HX OF CURRENT PROBLEM, REHAB EVAL
28 y/o F with PMHx DM (on insulin pump), seen in multiple EDs for persistent back pain, diagnosed with pyelonephritis and hemorraghic cystitis in past, dced from [place] ER for similar symptoms, had hematuria but normal renal ultrasound, p/w persistent lower back pain since her discharge. US renal negative

## 2019-06-06 NOTE — PROGRESS NOTE ADULT - ATTENDING COMMENTS
Maggie Chandra MD  Pager [phone] ([facility])/ [phone] ([facility]) [please provide 10 digit call back number]  Nights and weekends: [phone]  Please note that this patient may be followed by a different provider tomorrow. If no answer or after hours, please contact [phone].  For final dc reccomendations, please call [phone] or page the endocrine fellow on call.

## 2019-06-06 NOTE — DISCHARGE NOTE PROVIDER - NSDCCPCAREPLAN_GEN_ALL_CORE_FT
PRINCIPAL DISCHARGE DIAGNOSIS  Diagnosis: Back pain  Assessment and Plan of Treatment: Pain managment   Follow up with PCP   take meds as directed

## 2019-06-06 NOTE — DISCHARGE NOTE PROVIDER - CARE PROVIDER_API CALL
Agnes Urrutia)  Family Medicine  [address] [place] [address]  Phone: [phone]  Fax: [phone]  Follow Up Time:     Edith Horowitz ()  EndocrinologyMetabDiabetes; Internal Medicine  [address] [place] [id number]  Phone: [phone]  Fax: [phone]  Follow Up Time: 1 week

## 2019-06-06 NOTE — CHART NOTE - NSCHARTNOTEFT_GEN_A_CORE
Pt seen for nutrition consult for "pt newly diagnosed diabetic type 2". Pt with known type 2 DM for 7 years managed with insulin pump. Pt follows with endocrinologist and stated she has had one meeting with an outpatient RD. Pt stated 2-3 months ago her A1C was ~14%, noted recent A1C 9.5% 6/5 indicating poor control.     Diet recall: Breakfast: donut or muffin, Lunch: sandwich, Dinner: protein, starch, veggie, pt will snack on sweets at times such as swiss roll or chips. Pt will drink water, iced tea, and juices.    RD provided diet education regarding type 2 DM specifically reviewed which foods have carbohydrate, portion sizes using my plate method, choosing more whole grains, pairing carbohydrate with protein, carbohydrate counting with aim for 45-60g of carbohydrate per meal, nutrition facts label reading tips, avoidance of concentrated sweets, carbohydrate counting apps available to assist with process-pt seemed receptive to education however would benefit from further review, pt plans to meet with outpatient RD again next month.     Pt planned for discharge today, RD to remain available as needed.     Leticia Fischer R.D., [facility], Pager #[phone]

## 2019-06-06 NOTE — PROGRESS NOTE ADULT - PROBLEM SELECTOR PLAN 1
Trigger point identified. Needs out patient pain management FU
Trigger point identified. Needs out patient pain management FU.

## 2019-06-06 NOTE — DISCHARGE NOTE NURSING/CASE MANAGEMENT/SOCIAL WORK - NSDCDPATPORTLINK_GEN_ALL_CORE
You can access the [facility] Patient Portal, offered by [facility], by registering with the following website: [email or web address]

## 2019-06-06 NOTE — PROGRESS NOTE ADULT - ASSESSMENT
28 yo female with hx of ketosis-prone dm uncontrolled (managed with Omnipod) A1C 9.5, X 7 years without complication here with 1 day of intractable 10/10 lower back pain, endocrine consulted for diabetes mgt.   undergoing workup.    #1 Type 2 diabetes mellitus with hyperglycemia, ketosis prone, with long-term current use of insulin, on Omnipod at home   -RDE consult inpt  -Lantus 10 units sq qhs  -humalog 3 units tid-ac with small correctional scale tid-ac/qhs  DISPO: f/u with Dr. Horowitz and resume pump upon discharge. I have asked the pt to bring pump supplies.

## 2019-06-06 NOTE — PROGRESS NOTE ADULT - PROBLEM SELECTOR PROBLEM 2
Controlled type 2 diabetes mellitus without complication, with long-term current use of insulin
Controlled type 2 diabetes mellitus without complication, with long-term current use of insulin
Oligomenorrhea
Oligomenorrhea

## 2019-06-06 NOTE — PROGRESS NOTE ADULT - SUBJECTIVE AND OBJECTIVE BOX
Patient is a 29y old  Female who presents with a chief complaint of Intractable Back Pain (2019 19:19)      SUBJECTIVE / OVERNIGHT EVENTS:  Pain in low back, on the left side  Afebrile  Review of Systems:   CONSTITUTIONAL: No fever, weight loss, or fatigue  EYES: No eye pain, visual disturbances, or discharge  ENMT:  No difficulty hearing, tinnitus, vertigo; No sinus or throat pain  NECK: No pain or stiffness  BREASTS: No pain, masses, or nipple discharge  RESPIRATORY: No cough, wheezing, chills or hemoptysis; No shortness of breath  CARDIOVASCULAR: No chest pain, palpitations, dizziness, or leg swelling  GASTROINTESTINAL: No abdominal or epigastric pain. No nausea, vomiting, or hematemesis; No diarrhea or constipation. No melena or hematochezia.  GENITOURINARY: No dysuria, frequency, hematuria, or incontinence  NEUROLOGICAL: No headaches, memory loss, loss of strength, numbness, or tremors  SKIN: No itching, burning, rashes, or lesions   LYMPH NODES: No enlarged glands  ENDOCRINE: No heat or cold intolerance; No hair loss  MUSCULOSKELETAL: No joint pain or swelling; A knot like structure palpated in left low back above sacral ala  PSYCHIATRIC: No depression, anxiety, mood swings, or difficulty sleeping  HEME/LYMPH: No easy bruising, or bleeding gums  ALLERY AND IMMUNOLOGIC: No hives or eczema    MEDICATIONS  (STANDING):  dextrose 5%. 1000 milliLiter(s) (50 mL/Hr) IV Continuous <Continuous>  dextrose 50% Injectable 12.5 Gram(s) IV Push once  dextrose 50% Injectable 25 Gram(s) IV Push once  dextrose 50% Injectable 25 Gram(s) IV Push once  heparin  Injectable 5000 Unit(s) SubCutaneous every 12 hours  insulin glargine Injectable (LANTUS) 10 Unit(s) SubCutaneous at bedtime  insulin lispro (HumaLOG) corrective regimen sliding scale   SubCutaneous three times a day before meals  insulin lispro (HumaLOG) corrective regimen sliding scale   SubCutaneous at bedtime  insulin lispro Injectable (HumaLOG) 3 Unit(s) SubCutaneous three times a day before meals  lidocaine   Patch 1 Patch Transdermal every 24 hours  methocarbamol 750 milliGRAM(s) Oral two times a day    MEDICATIONS  (PRN):  acetaminophen   Tablet .. 650 milliGRAM(s) Oral every 6 hours PRN Mild Pain (1 - 3), Moderate Pain (4 - 6), Severe Pain (7 - 10)  cyclobenzaprine 10 milliGRAM(s) Oral three times a day PRN Muscle Spasm  dextrose 40% Gel 15 Gram(s) Oral once PRN Blood Glucose LESS THAN 70 milliGRAM(s)/deciliter  glucagon  Injectable 1 milliGRAM(s) IntraMuscular once PRN Glucose LESS THAN 70 milligrams/deciliter  traMADol 50 milliGRAM(s) Oral every 6 hours PRN Moderate Pain (4 - 6)      PHYSICAL EXAM:  Vital Signs Last 24 Hrs  T(C): 36.7 (2019 22:14), Max: 36.9 (2019 09:11)  T(F): 98.1 (2019 22:14), Max: 98.4 (2019 09:11)  HR: 126 (2019 22:14) (103 - 126)  BP: 123/88 (2019 22:14) (123/88 - 162/114)  BP(mean): --  RR: 16 (2019 22:14) (16 - 18)  SpO2: 97% (2019 22:14) (97% - 100%)  I&O's Summary    2019 07:  -  2019 07:00  --------------------------------------------------------  IN: 0 mL / OUT: 400 mL / NET: -400 mL    2019 07:01  -  2019 22:47  --------------------------------------------------------  IN: 1400 mL / OUT: 0 mL / NET: 1400 mL      GENERAL: NAD, well-developed  HEAD:  Atraumatic, Normocephalic  EYES: EOMI, PERRLA, conjunctiva and sclera clear  NECK: Supple, No JVD  CHEST/LUNG: Clear to auscultation bilaterally; No wheeze  HEART: Regular rate and rhythm; No murmurs, rubs, or gallops  ABDOMEN: Soft, Nontender, Nondistended; Bowel sounds present  EXTREMITIES:  2+ Peripheral Pulses, No clubbing, cyanosis, or edema  PSYCH: AAOx3  NEUROLOGY: non-focal  SKIN: No rashes or lesions    LABS:  CAPILLARY BLOOD GLUCOSE      POCT Blood Glucose.: 131 mg/dL (2019 22:03)  POCT Blood Glucose.: 161 mg/dL (2019 16:59)  POCT Blood Glucose.: 147 mg/dL (2019 13:12)  POCT Blood Glucose.: 137 mg/dL (2019 09:31)                          13.3   8.3   )-----------( 325      ( 2019 03:33 )             41.0     06-04    133<L>  |  99  |  10  ----------------------------<  415<H>  3.7   |  23  |  0.55    Ca    9.1      2019 03:33    TPro  6.6  /  Alb  3.7  /  TBili  0.3  /  DBili  x   /  AST  17  /  ALT  49<H>  /  AlkPhos  50  06-04          Urinalysis Basic - ( 2019 11:50 )    Color: Light Yellow / Appearance: Clear / S.020 / pH: x  Gluc: x / Ketone: Negative  / Bili: Negative / Urobili: Negative   Blood: x / Protein: Trace / Nitrite: Negative   Leuk Esterase: Negative / RBC: 1 /hpf / WBC 4 /HPF   Sq Epi: x / Non Sq Epi: 6 /hpf / Bacteria: Few        RADIOLOGY & ADDITIONAL TESTS:    Imaging Personally Reviewed:    Consultant(s) Notes Reviewed:      Care Discussed with Consultants/Other Providers:
Chief Complaint/Follow-up on:   DM    Subjective:  POC blood glucose and insulin use reviewed.  pt states she is not eating and no appetite, and asking me if soemthing can be given for pain.  She is comfortable in bed and reports that at times she gets LLQ pain,  FSBG well controlled.  does not have pump supplies  She had [employer] donuts at bedside, has been seen eating by staff.            MEDICATIONS  (STANDING):  dextrose 5%. 1000 milliLiter(s) (50 mL/Hr) IV Continuous <Continuous>  dextrose 50% Injectable 12.5 Gram(s) IV Push once  dextrose 50% Injectable 25 Gram(s) IV Push once  dextrose 50% Injectable 25 Gram(s) IV Push once  heparin  Injectable 5000 Unit(s) SubCutaneous every 12 hours  insulin glargine Injectable (LANTUS) 10 Unit(s) SubCutaneous at bedtime  insulin lispro (HumaLOG) corrective regimen sliding scale   SubCutaneous three times a day before meals  insulin lispro (HumaLOG) corrective regimen sliding scale   SubCutaneous at bedtime  insulin lispro Injectable (HumaLOG) 3 Unit(s) SubCutaneous three times a day before meals  lidocaine   Patch 1 Patch Transdermal every 24 hours  methocarbamol 750 milliGRAM(s) Oral two times a day    MEDICATIONS  (PRN):  acetaminophen   Tablet .. 650 milliGRAM(s) Oral every 6 hours PRN Mild Pain (1 - 3), Moderate Pain (4 - 6), Severe Pain (7 - 10)  cyclobenzaprine 10 milliGRAM(s) Oral three times a day PRN Muscle Spasm  dextrose 40% Gel 15 Gram(s) Oral once PRN Blood Glucose LESS THAN 70 milliGRAM(s)/deciliter  glucagon  Injectable 1 milliGRAM(s) IntraMuscular once PRN Glucose LESS THAN 70 milligrams/deciliter  traMADol 50 milliGRAM(s) Oral every 6 hours PRN Moderate Pain (4 - 6)      PHYSICAL EXAM:  VITALS: T(C): 36.6 (06-05-19 @ 16:36)  T(F): 97.9 (06-05-19 @ 16:36), Max: 98.4 (06-05-19 @ 09:11)  HR: 103 (06-05-19 @ 16:36) (63 - 112)  BP: 162/114 (06-05-19 @ 16:36) (107/53 - 162/114)  RR:  (16 - 18)  SpO2:  (98% - 100%)  Wt(kg): --  GENERAL: NAD, well-groomed, well-developed  EYES: No proptosis, no injection  HEENT:  Atraumatic, Normocephalic, moist mucous membranes  RESPIRATORY: Clear to auscultation bilaterally; No rales, rhonchi, wheezing, or rubs  CARDIOVASCULAR: Regular rate and rhythm; No murmurs; no peripheral edema  GI: Soft, nontender, non distended, normal bowel sounds      POCT Blood Glucose.: 147 mg/dL (06-05-19 @ 13:12)  POCT Blood Glucose.: 137 mg/dL (06-05-19 @ 09:31)  POCT Blood Glucose.: 192 mg/dL (06-04-19 @ 22:09)  POCT Blood Glucose.: 213 mg/dL (06-04-19 @ 18:01)  POCT Blood Glucose.: 257 mg/dL (06-04-19 @ 12:39)  POCT Blood Glucose.: 240 mg/dL (06-04-19 @ 11:05)  POCT Blood Glucose.: 272 mg/dL (06-04-19 @ 08:13)  POCT Blood Glucose.: 280 mg/dL (06-04-19 @ 06:51)    06-04    133<L>  |  99  |  10  ----------------------------<  415<H>  3.7   |  23  |  0.55    EGFR if : 147  EGFR if non : 127    Ca    9.1      06-04    TPro  6.6  /  Alb  3.7  /  TBili  0.3  /  DBili  x   /  AST  17  /  ALT  49<H>  /  AlkPhos  50  06-04          Thyroid Function Tests:      Hemoglobin A1C, Whole Blood: 9.5 % <H> [4.0 - 5.6] (06-05-19 @ 08:32)
Contact info:   Wil Paniagua MD  [phone]    Subjective: No acute event overnight.    She's getting discharge today.   Will be going back on insulin pump Omnipod and will have follow up with Dr. Horowitz    MEDICATIONS  (STANDING):  docusate sodium 100 milliGRAM(s) Oral two times a day  heparin  Injectable 5000 Unit(s) SubCutaneous every 12 hours  insulin glargine Injectable (LANTUS) 10 Unit(s) SubCutaneous at bedtime  insulin lispro (HumaLOG) corrective regimen sliding scale   SubCutaneous three times a day before meals  insulin lispro (HumaLOG) corrective regimen sliding scale   SubCutaneous at bedtime  insulin lispro Injectable (HumaLOG) 3 Unit(s) SubCutaneous three times a day before meals  lidocaine   Patch 1 Patch Transdermal every 24 hours  methocarbamol 750 milliGRAM(s) Oral two times a day  senna 2 Tablet(s) Oral at bedtime    MEDICATIONS  (PRN):  acetaminophen   Tablet .. 650 milliGRAM(s) Oral every 6 hours PRN Mild Pain (1 - 3), Moderate Pain (4 - 6), Severe Pain (7 - 10)  cyclobenzaprine 10 milliGRAM(s) Oral three times a day PRN Muscle Spasm  dextrose 40% Gel 15 Gram(s) Oral once PRN Blood Glucose LESS THAN 70 milliGRAM(s)/deciliter  glucagon  Injectable 1 milliGRAM(s) IntraMuscular once PRN Glucose LESS THAN 70 milligrams/deciliter  traMADol 50 milliGRAM(s) Oral every 6 hours PRN Moderate Pain (4 - 6)      Allergies    No Known Allergies    Review of Systems:  Constitutional: No fever  Eyes: No blurry vision  Neuro: No tremors  HEENT: No pain  Cardiovascular: No chest pain, palpitations  Respiratory: No SOB, no cough  GI: No nausea, vomiting, abdominal pain  : No dysuria  Skin: no rash  Psych: no depression  Endocrine: no polyuria, polydipsia  Hem/lymph: no swelling  Osteoporosis: no fractures    ALL OTHER SYSTEMS REVIEWED AND NEGATIVE    PHYSICAL EXAM:  VITALS: T(C): 36.7 (06-06-19 @ 09:22)  T(F): 98.1 (06-06-19 @ 09:22), Max: 98.6 (06-06-19 @ 05:15)  HR: 114 (06-06-19 @ 09:22) (103 - 126)  BP: 136/99 (06-06-19 @ 09:22) (121/83 - 162/114)  RR:  (16 - 18)  SpO2:  (97% - 99%)  Wt(kg): --  GENERAL: NAD, well-groomed, well-developed  EYES: No proptosis, no injection  HEENT:  Atraumatic, Normocephalic, moist mucous membranes  RESPIRATORY: Clear to auscultation bilaterally; No rales, rhonchi, wheezing, or rubs  CARDIOVASCULAR: Regular rate and rhythm; No murmurs; no peripheral edema  GI: Soft, nontender, non distended, normal bowel sounds    POCT Blood Glucose.: 204 mg/dL (06-06-19 @ 13:01)  POCT Blood Glucose.: 163 mg/dL (06-06-19 @ 08:43)  POCT Blood Glucose.: 131 mg/dL (06-05-19 @ 22:03)  POCT Blood Glucose.: 161 mg/dL (06-05-19 @ 16:59)  POCT Blood Glucose.: 147 mg/dL (06-05-19 @ 13:12)  POCT Blood Glucose.: 137 mg/dL (06-05-19 @ 09:31)  POCT Blood Glucose.: 192 mg/dL (06-04-19 @ 22:09)  POCT Blood Glucose.: 213 mg/dL (06-04-19 @ 18:01)  POCT Blood Glucose.: 257 mg/dL (06-04-19 @ 12:39)  POCT Blood Glucose.: 240 mg/dL (06-04-19 @ 11:05)  POCT Blood Glucose.: 272 mg/dL (06-04-19 @ 08:13)  POCT Blood Glucose.: 280 mg/dL (06-04-19 @ 06:51)      06-04    133<L>  |  99  |  10  ----------------------------<  415<H>  3.7   |  23  |  0.55    EGFR if : 147  EGFR if non : 127    Ca    9.1      06-04    TPro  6.6  /  Alb  3.7  /  TBili  0.3  /  DBili  x   /  AST  17  /  ALT  49<H>  /  AlkPhos  50  06-04        Thyroid Function Tests:      Hemoglobin A1C, Whole Blood: 9.5 % <H> [4.0 - 5.6] (06-05-19 @ 08:32)
Patient is a 29y old  Female who presents with a chief complaint of Intractable Back Pain (2019 19:19)      SUBJECTIVE / OVERNIGHT EVENTS:  Pain in low back, on the left side  Review of Systems:   CONSTITUTIONAL: No fever, weight loss, or fatigue  EYES: No eye pain, visual disturbances, or discharge  ENMT:  No difficulty hearing, tinnitus, vertigo; No sinus or throat pain  NECK: No pain or stiffness  BREASTS: No pain, masses, or nipple discharge  RESPIRATORY: No cough, wheezing, chills or hemoptysis; No shortness of breath  CARDIOVASCULAR: No chest pain, palpitations, dizziness, or leg swelling  GASTROINTESTINAL: No abdominal or epigastric pain. No nausea, vomiting, or hematemesis; No diarrhea or constipation. No melena or hematochezia.  GENITOURINARY: No dysuria, frequency, hematuria, or incontinence  NEUROLOGICAL: No headaches, memory loss, loss of strength, numbness, or tremors  SKIN: No itching, burning, rashes, or lesions   LYMPH NODES: No enlarged glands  ENDOCRINE: No heat or cold intolerance; No hair loss  MUSCULOSKELETAL: No joint pain or swelling; A knot like structure palpated in left low back above sacral ala  PSYCHIATRIC: No depression, anxiety, mood swings, or difficulty sleeping  HEME/LYMPH: No easy bruising, or bleeding gums  ALLERY AND IMMUNOLOGIC: No hives or eczema    MEDICATIONS  (STANDING):  dextrose 5%. 1000 milliLiter(s) (50 mL/Hr) IV Continuous <Continuous>  dextrose 50% Injectable 12.5 Gram(s) IV Push once  dextrose 50% Injectable 25 Gram(s) IV Push once  dextrose 50% Injectable 25 Gram(s) IV Push once  heparin  Injectable 5000 Unit(s) SubCutaneous every 12 hours  insulin glargine Injectable (LANTUS) 10 Unit(s) SubCutaneous at bedtime  insulin lispro (HumaLOG) corrective regimen sliding scale   SubCutaneous three times a day before meals  insulin lispro (HumaLOG) corrective regimen sliding scale   SubCutaneous at bedtime  insulin lispro Injectable (HumaLOG) 3 Unit(s) SubCutaneous three times a day before meals  lidocaine   Patch 1 Patch Transdermal every 24 hours  methocarbamol 750 milliGRAM(s) Oral two times a day    MEDICATIONS  (PRN):  acetaminophen   Tablet .. 650 milliGRAM(s) Oral every 6 hours PRN Mild Pain (1 - 3), Moderate Pain (4 - 6), Severe Pain (7 - 10)  cyclobenzaprine 10 milliGRAM(s) Oral three times a day PRN Muscle Spasm  dextrose 40% Gel 15 Gram(s) Oral once PRN Blood Glucose LESS THAN 70 milliGRAM(s)/deciliter  glucagon  Injectable 1 milliGRAM(s) IntraMuscular once PRN Glucose LESS THAN 70 milligrams/deciliter  traMADol 50 milliGRAM(s) Oral every 6 hours PRN Moderate Pain (4 - 6)      PHYSICAL EXAM:  Vital Signs Last 24 Hrs  T(C): 36.7 (2019 22:14), Max: 36.9 (2019 09:11)  T(F): 98.1 (2019 22:14), Max: 98.4 (2019 09:11)  HR: 126 (2019 22:14) (103 - 126)  BP: 123/88 (2019 22:14) (123/88 - 162/114)  BP(mean): --  RR: 16 (2019 22:14) (16 - 18)  SpO2: 97% (2019 22:14) (97% - 100%)  I&O's Summary    2019 07:01  -  2019 07:00  --------------------------------------------------------  IN: 0 mL / OUT: 400 mL / NET: -400 mL    2019 07:01  -  2019 22:47  --------------------------------------------------------  IN: 1400 mL / OUT: 0 mL / NET: 1400 mL      GENERAL: NAD, well-developed  HEAD:  Atraumatic, Normocephalic  EYES: EOMI, PERRLA, conjunctiva and sclera clear  NECK: Supple, No JVD  CHEST/LUNG: Clear to auscultation bilaterally; No wheeze  HEART: Regular rate and rhythm; No murmurs, rubs, or gallops  ABDOMEN: Soft, Nontender, Nondistended; Bowel sounds present  EXTREMITIES:  2+ Peripheral Pulses, No clubbing, cyanosis, or edema  PSYCH: AAOx3  NEUROLOGY: non-focal  SKIN: No rashes or lesions    LABS:  CAPILLARY BLOOD GLUCOSE      POCT Blood Glucose.: 131 mg/dL (2019 22:03)  POCT Blood Glucose.: 161 mg/dL (2019 16:59)  POCT Blood Glucose.: 147 mg/dL (2019 13:12)  POCT Blood Glucose.: 137 mg/dL (2019 09:31)                          13.3   8.3   )-----------( 325      ( 2019 03:33 )             41.0     06-04    133<L>  |  99  |  10  ----------------------------<  415<H>  3.7   |  23  |  0.55    Ca    9.1      2019 03:33    TPro  6.6  /  Alb  3.7  /  TBili  0.3  /  DBili  x   /  AST  17  /  ALT  49<H>  /  AlkPhos  50  06-04          Urinalysis Basic - ( 2019 11:50 )    Color: Light Yellow / Appearance: Clear / S.020 / pH: x  Gluc: x / Ketone: Negative  / Bili: Negative / Urobili: Negative   Blood: x / Protein: Trace / Nitrite: Negative   Leuk Esterase: Negative / RBC: 1 /hpf / WBC 4 /HPF   Sq Epi: x / Non Sq Epi: 6 /hpf / Bacteria: Few        RADIOLOGY & ADDITIONAL TESTS:    Imaging Personally Reviewed:    Consultant(s) Notes Reviewed:      Care Discussed with Consultants/Other Providers:

## 2019-06-09 LAB
CULTURE RESULTS: SIGNIFICANT CHANGE UP
CULTURE RESULTS: SIGNIFICANT CHANGE UP
SPECIMEN SOURCE: SIGNIFICANT CHANGE UP
SPECIMEN SOURCE: SIGNIFICANT CHANGE UP

## 2019-06-11 ENCOUNTER — INPATIENT (INPATIENT)
Facility: HOSPITAL | Age: 29
LOS: 1 days | Discharge: HOME | DRG: 639 | End: 2019-06-13
Attending: INTERNAL MEDICINE | Admitting: INTERNAL MEDICINE
Payer: MEDICAID

## 2019-06-11 VITALS
TEMPERATURE: 98 F | DIASTOLIC BLOOD PRESSURE: 88 MMHG | WEIGHT: 110.01 LBS | HEART RATE: 129 BPM | HEIGHT: 59 IN | OXYGEN SATURATION: 99 % | SYSTOLIC BLOOD PRESSURE: 122 MMHG | RESPIRATION RATE: 20 BRPM

## 2019-06-11 PROCEDURE — 99285 EMERGENCY DEPT VISIT HI MDM: CPT

## 2019-06-11 RX ORDER — SODIUM CHLORIDE 9 MG/ML
1000 INJECTION INTRAMUSCULAR; INTRAVENOUS; SUBCUTANEOUS ONCE
Refills: 0 | Status: COMPLETED | OUTPATIENT
Start: 2019-06-11 | End: 2019-06-11

## 2019-06-11 RX ORDER — OXYCODONE AND ACETAMINOPHEN 5; 325 MG/1; MG/1
1 TABLET ORAL ONCE
Refills: 0 | Status: DISCONTINUED | OUTPATIENT
Start: 2019-06-11 | End: 2019-06-11

## 2019-06-11 RX ADMIN — OXYCODONE AND ACETAMINOPHEN 1 TABLET(S): 5; 325 TABLET ORAL at 23:45

## 2019-06-11 RX ADMIN — SODIUM CHLORIDE 1000 MILLILITER(S): 9 INJECTION INTRAMUSCULAR; INTRAVENOUS; SUBCUTANEOUS at 23:46

## 2019-06-11 NOTE — ED PROVIDER NOTE - PROGRESS NOTE DETAILS
Kylie Myles, DO: discussed with endocrine fellow as patient can not reattach insulin pump s/p CT scan. Patient to be covered with Lantus 10U and ISS with meals. If patient to be d/c home, has supply at home and tomorrow is day to switch sites. If patient to be admitted, to be covered as noted above. Patient not to reattach pump for 24 hours after Lantus administration. Kylie Myles, DO: CT reviewed with patient and mom at bedside. Will admit for hypoglycemia likely in setting of poor PO intake on insulin pump. Pump currently off. Lantus administered, will see patient in AM. Kylie Myles DO: Mom no longer at bedside. Patient denies intentional weight loss.

## 2019-06-11 NOTE — ED ADULT NURSE NOTE - NSIMPLEMENTINTERV_GEN_ALL_ED
Implemented All Universal Safety Interventions:  [place] to call system. Call bell, personal items and telephone within reach. Instruct patient to call for assistance. Room bathroom lighting operational. Non-slip footwear when patient is off stretcher. Physically safe environment: no spills, clutter or unnecessary equipment. Stretcher in lowest position, wheels locked, appropriate side rails in place.

## 2019-06-11 NOTE — ED PROVIDER NOTE - ATTENDING CONTRIBUTION TO CARE
29F with hx of IDDM here for abdominal pain and back pain x months associated with 60 lb weight loss. Patient recently seen in ED, admitted for intractable pain and discharged with PMD follow up and Ortho f/u. Patient has reportedly had CTs and lower back MRI. Labs CT abd,  Will admit for hypoglycemia likely in setting of poor PO intake on insulin pump. Pump currently off. Lantus administered, reassess

## 2019-06-11 NOTE — ED ADULT NURSE NOTE - CHPI ED NUR SYMPTOMS NEG
no vomiting/no dysuria/no hematuria/no blood in stool/no burning urination/no chills/no diarrhea/no fever/no nausea/no abdominal distension

## 2019-06-11 NOTE — ED PROVIDER NOTE - OBJECTIVE STATEMENT
Kylie Myles, DO: 29F with hx of IDDM here for abdominal pain and back pain x months associated with 60 lb weight loss. Patient recently seen in ED, admitted for intractable pain and discharged with PMD follow up and Ortho f/u. Patient has reportedly had CTs and lower back MRI. Saw PMD s/p discharge & orthopedist with plan for MRI tomorrow. Denies vaginal discharge/bleeding, diarrhea, fevers, nausea/vomiting. Denies SI/HI/AH/VH.

## 2019-06-11 NOTE — ED ADULT NURSE NOTE - OBJECTIVE STATEMENT
30 yo F w/ PMHx of DM (on insulin pump) presents to ED c/o back pain radiating to lower abd for past several months. Pt states she has been seen in ED and by PMD for same symptoms and no causes have been found, but pain persists. Pt denies any CP, SOB, N/V, fever, chills, urinary complaints, constipation, diarrhea, HA, dizziness, weakness. Pt A&Ox4, lungs CTA, +central pulses. Abdomen soft, not distended. Ambulating w/ steady gait, safety and comfort maintained, no acute distress noted at this time. 30 yo F w/ PMHx of DM (on insulin pump) presents to ED c/o back pain radiating to lower abd for past several months. Pt states she has been seen in ED and by PMD for same symptoms and no causes have been found, but pain persists. Pt also reports 60+ lb weight loss over past several months, unknown cause. Pt denies any CP, SOB, N/V, fever, chills, urinary complaints, constipation, diarrhea, HA, dizziness, weakness. Pt A&Ox4, lungs CTA, +central pulses. Abdomen soft, not distended. Ambulating w/ steady gait, safety and comfort maintained, no acute distress noted at this time.

## 2019-06-12 DIAGNOSIS — M54.5 LOW BACK PAIN: ICD-10-CM

## 2019-06-12 DIAGNOSIS — Z29.9 ENCOUNTER FOR PROPHYLACTIC MEASURES, UNSPECIFIED: ICD-10-CM

## 2019-06-12 DIAGNOSIS — E11.9 TYPE 2 DIABETES MELLITUS WITHOUT COMPLICATIONS: ICD-10-CM

## 2019-06-12 DIAGNOSIS — D72.829 ELEVATED WHITE BLOOD CELL COUNT, UNSPECIFIED: ICD-10-CM

## 2019-06-12 DIAGNOSIS — E16.2 HYPOGLYCEMIA, UNSPECIFIED: ICD-10-CM

## 2019-06-12 DIAGNOSIS — E10.8 TYPE 1 DIABETES MELLITUS WITH UNSPECIFIED COMPLICATIONS: ICD-10-CM

## 2019-06-12 DIAGNOSIS — E78.5 HYPERLIPIDEMIA, UNSPECIFIED: ICD-10-CM

## 2019-06-12 LAB
ALBUMIN SERPL ELPH-MCNC: 4.4 G/DL — SIGNIFICANT CHANGE UP (ref 3.3–5)
ALP SERPL-CCNC: 52 U/L — SIGNIFICANT CHANGE UP (ref 40–120)
ALT FLD-CCNC: 52 U/L — HIGH (ref 10–45)
ANION GAP SERPL CALC-SCNC: 13 MMOL/L — SIGNIFICANT CHANGE UP (ref 5–17)
APPEARANCE UR: CLEAR — SIGNIFICANT CHANGE UP
AST SERPL-CCNC: 32 U/L — SIGNIFICANT CHANGE UP (ref 10–40)
BACTERIA # UR AUTO: ABNORMAL
BASOPHILS # BLD AUTO: 0.1 K/UL — SIGNIFICANT CHANGE UP (ref 0–0.2)
BASOPHILS NFR BLD AUTO: 1 % — SIGNIFICANT CHANGE UP (ref 0–2)
BILIRUB SERPL-MCNC: 0.3 MG/DL — SIGNIFICANT CHANGE UP (ref 0.2–1.2)
BILIRUB UR-MCNC: NEGATIVE — SIGNIFICANT CHANGE UP
BUN SERPL-MCNC: 11 MG/DL — SIGNIFICANT CHANGE UP (ref 7–23)
CALCIUM SERPL-MCNC: 10.4 MG/DL — SIGNIFICANT CHANGE UP (ref 8.4–10.5)
CHLORIDE SERPL-SCNC: 101 MMOL/L — SIGNIFICANT CHANGE UP (ref 96–108)
CO2 SERPL-SCNC: 27 MMOL/L — SIGNIFICANT CHANGE UP (ref 22–31)
COLOR SPEC: SIGNIFICANT CHANGE UP
CREAT SERPL-MCNC: 0.51 MG/DL — SIGNIFICANT CHANGE UP (ref 0.5–1.3)
DIFF PNL FLD: NEGATIVE — SIGNIFICANT CHANGE UP
EOSINOPHIL # BLD AUTO: 0.2 K/UL — SIGNIFICANT CHANGE UP (ref 0–0.5)
EOSINOPHIL NFR BLD AUTO: 1.3 % — SIGNIFICANT CHANGE UP (ref 0–6)
EPI CELLS # UR: 13 /HPF — HIGH
GLUCOSE BLDC GLUCOMTR-MCNC: 118 MG/DL — HIGH (ref 70–99)
GLUCOSE BLDC GLUCOMTR-MCNC: 130 MG/DL — HIGH (ref 70–99)
GLUCOSE BLDC GLUCOMTR-MCNC: 160 MG/DL — HIGH (ref 70–99)
GLUCOSE BLDC GLUCOMTR-MCNC: 173 MG/DL — HIGH (ref 70–99)
GLUCOSE BLDC GLUCOMTR-MCNC: 183 MG/DL — HIGH (ref 70–99)
GLUCOSE BLDC GLUCOMTR-MCNC: 205 MG/DL — HIGH (ref 70–99)
GLUCOSE SERPL-MCNC: 69 MG/DL — LOW (ref 70–99)
GLUCOSE UR QL: ABNORMAL
HCT VFR BLD CALC: 42.4 % — SIGNIFICANT CHANGE UP (ref 34.5–45)
HGB BLD-MCNC: 13.9 G/DL — SIGNIFICANT CHANGE UP (ref 11.5–15.5)
HYALINE CASTS # UR AUTO: 1 /LPF — SIGNIFICANT CHANGE UP (ref 0–2)
KETONES UR-MCNC: NEGATIVE — SIGNIFICANT CHANGE UP
LEUKOCYTE ESTERASE UR-ACNC: ABNORMAL
LIDOCAIN IGE QN: 19 U/L — SIGNIFICANT CHANGE UP (ref 7–60)
LYMPHOCYTES # BLD AUTO: 43.9 % — SIGNIFICANT CHANGE UP (ref 13–44)
LYMPHOCYTES # BLD AUTO: 5.4 K/UL — HIGH (ref 1–3.3)
MCHC RBC-ENTMCNC: 29.6 PG — SIGNIFICANT CHANGE UP (ref 27–34)
MCHC RBC-ENTMCNC: 32.7 GM/DL — SIGNIFICANT CHANGE UP (ref 32–36)
MCV RBC AUTO: 90.6 FL — SIGNIFICANT CHANGE UP (ref 80–100)
MONOCYTES # BLD AUTO: 1 K/UL — HIGH (ref 0–0.9)
MONOCYTES NFR BLD AUTO: 7.9 % — SIGNIFICANT CHANGE UP (ref 2–14)
NEUTROPHILS # BLD AUTO: 5.6 K/UL — SIGNIFICANT CHANGE UP (ref 1.8–7.4)
NEUTROPHILS NFR BLD AUTO: 45.9 % — SIGNIFICANT CHANGE UP (ref 43–77)
NITRITE UR-MCNC: NEGATIVE — SIGNIFICANT CHANGE UP
PH UR: 6.5 — SIGNIFICANT CHANGE UP (ref 5–8)
PLATELET # BLD AUTO: 381 K/UL — SIGNIFICANT CHANGE UP (ref 150–400)
POTASSIUM SERPL-MCNC: 3.7 MMOL/L — SIGNIFICANT CHANGE UP (ref 3.5–5.3)
POTASSIUM SERPL-SCNC: 3.7 MMOL/L — SIGNIFICANT CHANGE UP (ref 3.5–5.3)
PROT SERPL-MCNC: 7.8 G/DL — SIGNIFICANT CHANGE UP (ref 6–8.3)
PROT UR-MCNC: NEGATIVE — SIGNIFICANT CHANGE UP
RBC # BLD: 4.68 M/UL — SIGNIFICANT CHANGE UP (ref 3.8–5.2)
RBC # FLD: 11.4 % — SIGNIFICANT CHANGE UP (ref 10.3–14.5)
RBC CASTS # UR COMP ASSIST: 1 /HPF — SIGNIFICANT CHANGE UP (ref 0–4)
SODIUM SERPL-SCNC: 141 MMOL/L — SIGNIFICANT CHANGE UP (ref 135–145)
SP GR SPEC: 1.03 — HIGH (ref 1.01–1.02)
UROBILINOGEN FLD QL: NEGATIVE — SIGNIFICANT CHANGE UP
WBC # BLD: 12.2 K/UL — HIGH (ref 3.8–10.5)
WBC # FLD AUTO: 12.2 K/UL — HIGH (ref 3.8–10.5)
WBC UR QL: 6 /HPF — HIGH (ref 0–5)

## 2019-06-12 PROCEDURE — 99223 1ST HOSP IP/OBS HIGH 75: CPT

## 2019-06-12 PROCEDURE — 72158 MRI LUMBAR SPINE W/O & W/DYE: CPT | Mod: 26

## 2019-06-12 PROCEDURE — 74177 CT ABD & PELVIS W/CONTRAST: CPT | Mod: 26

## 2019-06-12 RX ORDER — SODIUM CHLORIDE 9 MG/ML
1000 INJECTION, SOLUTION INTRAVENOUS
Refills: 0 | Status: DISCONTINUED | OUTPATIENT
Start: 2019-06-12 | End: 2019-06-13

## 2019-06-12 RX ORDER — DOCUSATE SODIUM 100 MG
100 CAPSULE ORAL
Refills: 0 | Status: DISCONTINUED | OUTPATIENT
Start: 2019-06-12 | End: 2019-06-13

## 2019-06-12 RX ORDER — SODIUM CHLORIDE 9 MG/ML
1000 INJECTION INTRAMUSCULAR; INTRAVENOUS; SUBCUTANEOUS ONCE
Refills: 0 | Status: COMPLETED | OUTPATIENT
Start: 2019-06-12 | End: 2019-06-12

## 2019-06-12 RX ORDER — DEXTROSE 50 % IN WATER 50 %
15 SYRINGE (ML) INTRAVENOUS ONCE
Refills: 0 | Status: DISCONTINUED | OUTPATIENT
Start: 2019-06-12 | End: 2019-06-13

## 2019-06-12 RX ORDER — OXYCODONE AND ACETAMINOPHEN 5; 325 MG/1; MG/1
1 TABLET ORAL ONCE
Refills: 0 | Status: DISCONTINUED | OUTPATIENT
Start: 2019-06-12 | End: 2019-06-12

## 2019-06-12 RX ORDER — INSULIN GLARGINE 100 [IU]/ML
10 INJECTION, SOLUTION SUBCUTANEOUS ONCE
Refills: 0 | Status: COMPLETED | OUTPATIENT
Start: 2019-06-12 | End: 2019-06-12

## 2019-06-12 RX ORDER — GLUCAGON INJECTION, SOLUTION 0.5 MG/.1ML
1 INJECTION, SOLUTION SUBCUTANEOUS ONCE
Refills: 0 | Status: DISCONTINUED | OUTPATIENT
Start: 2019-06-12 | End: 2019-06-13

## 2019-06-12 RX ORDER — OXYCODONE AND ACETAMINOPHEN 5; 325 MG/1; MG/1
1 TABLET ORAL EVERY 4 HOURS
Refills: 0 | Status: DISCONTINUED | OUTPATIENT
Start: 2019-06-12 | End: 2019-06-13

## 2019-06-12 RX ORDER — DEXTROSE 50 % IN WATER 50 %
25 SYRINGE (ML) INTRAVENOUS ONCE
Refills: 0 | Status: DISCONTINUED | OUTPATIENT
Start: 2019-06-12 | End: 2019-06-13

## 2019-06-12 RX ORDER — SENNA PLUS 8.6 MG/1
2 TABLET ORAL AT BEDTIME
Refills: 0 | Status: DISCONTINUED | OUTPATIENT
Start: 2019-06-12 | End: 2019-06-13

## 2019-06-12 RX ORDER — INSULIN LISPRO 100/ML
VIAL (ML) SUBCUTANEOUS
Refills: 0 | Status: DISCONTINUED | OUTPATIENT
Start: 2019-06-12 | End: 2019-06-13

## 2019-06-12 RX ORDER — DEXTROSE 50 % IN WATER 50 %
12.5 SYRINGE (ML) INTRAVENOUS ONCE
Refills: 0 | Status: DISCONTINUED | OUTPATIENT
Start: 2019-06-12 | End: 2019-06-13

## 2019-06-12 RX ORDER — INSULIN LISPRO 100/ML
VIAL (ML) SUBCUTANEOUS AT BEDTIME
Refills: 0 | Status: DISCONTINUED | OUTPATIENT
Start: 2019-06-12 | End: 2019-06-13

## 2019-06-12 RX ORDER — INSULIN GLARGINE 100 [IU]/ML
8 INJECTION, SOLUTION SUBCUTANEOUS AT BEDTIME
Refills: 0 | Status: DISCONTINUED | OUTPATIENT
Start: 2019-06-12 | End: 2019-06-13

## 2019-06-12 RX ORDER — INSULIN LISPRO 100/ML
2 VIAL (ML) SUBCUTANEOUS
Refills: 0 | Status: DISCONTINUED | OUTPATIENT
Start: 2019-06-12 | End: 2019-06-13

## 2019-06-12 RX ADMIN — OXYCODONE AND ACETAMINOPHEN 1 TABLET(S): 5; 325 TABLET ORAL at 07:20

## 2019-06-12 RX ADMIN — Medication 1: at 11:41

## 2019-06-12 RX ADMIN — INSULIN GLARGINE 8 UNIT(S): 100 INJECTION, SOLUTION SUBCUTANEOUS at 22:28

## 2019-06-12 RX ADMIN — SENNA PLUS 2 TABLET(S): 8.6 TABLET ORAL at 22:41

## 2019-06-12 RX ADMIN — OXYCODONE AND ACETAMINOPHEN 1 TABLET(S): 5; 325 TABLET ORAL at 18:08

## 2019-06-12 RX ADMIN — Medication 2: at 16:35

## 2019-06-12 RX ADMIN — SODIUM CHLORIDE 1000 MILLILITER(S): 9 INJECTION INTRAMUSCULAR; INTRAVENOUS; SUBCUTANEOUS at 03:06

## 2019-06-12 RX ADMIN — OXYCODONE AND ACETAMINOPHEN 1 TABLET(S): 5; 325 TABLET ORAL at 12:15

## 2019-06-12 RX ADMIN — SODIUM CHLORIDE 1000 MILLILITER(S): 9 INJECTION INTRAMUSCULAR; INTRAVENOUS; SUBCUTANEOUS at 03:12

## 2019-06-12 RX ADMIN — Medication 1: at 08:14

## 2019-06-12 RX ADMIN — OXYCODONE AND ACETAMINOPHEN 1 TABLET(S): 5; 325 TABLET ORAL at 22:27

## 2019-06-12 RX ADMIN — OXYCODONE AND ACETAMINOPHEN 1 TABLET(S): 5; 325 TABLET ORAL at 23:26

## 2019-06-12 RX ADMIN — OXYCODONE AND ACETAMINOPHEN 1 TABLET(S): 5; 325 TABLET ORAL at 11:42

## 2019-06-12 RX ADMIN — OXYCODONE AND ACETAMINOPHEN 1 TABLET(S): 5; 325 TABLET ORAL at 17:38

## 2019-06-12 RX ADMIN — Medication 2 UNIT(S): at 16:35

## 2019-06-12 RX ADMIN — OXYCODONE AND ACETAMINOPHEN 1 TABLET(S): 5; 325 TABLET ORAL at 01:50

## 2019-06-12 RX ADMIN — Medication 100 MILLIGRAM(S): at 17:13

## 2019-06-12 RX ADMIN — INSULIN GLARGINE 10 UNIT(S): 100 INJECTION, SOLUTION SUBCUTANEOUS at 01:50

## 2019-06-12 RX ADMIN — OXYCODONE AND ACETAMINOPHEN 1 TABLET(S): 5; 325 TABLET ORAL at 06:29

## 2019-06-12 NOTE — H&P ADULT - PROBLEM SELECTOR PLAN 1
- presents with c/o acute worsening of low back pain  - c/w Percocet 5/325 q 4 prn for severe pain  - will obtain MRI Lumbar spine with IV contrast  - PT eval

## 2019-06-12 NOTE — H&P ADULT - HISTORY OF PRESENT ILLNESS
29F with hx of IDDM on Insulin pump who presents with c/o  worsening abdominal pain and back pain x several months. Pt also reports unintentional weight loss.  Back pain is bilateral, described as achy to sharp, 10/10 at its worst. She denies aggravating factors and reports that is transiently relieved by Percocet. Pt denies tingling/numbness, urine/bowel incontinence. She reports that the pain radiates to the abdomen. Denies nausea/vomiting, fever/chills, diarrhea or urinary symptoms. She c/o unintentional weight loss of 60lbs over the past 6 months. She c/o poor appetite and early satiety. Of note she recently presented to [facility] ED for  intractable pain and discharged with PMD follow up and Ortho f/u. Patient has reportedly had CTs and lower back MRI. Saw PMD s/p discharge & orthopedist with plan for MRI tomorrow. 29F with hx of IDDM on Insulin pump who presents with c/o  worsening abdominal pain and back pain x several months. Pt also reports unintentional weight loss.  Back pain is bilateral, described as achy to sharp, 10/10 at its worst. She denies aggravating factors and reports that is transiently relieved by Percocet. Pt denies tingling/numbness, urine/bowel incontinence. She reports that the pain radiates to the abdomen. Denies nausea/vomiting, fever/chills, diarrhea or urinary symptoms. She c/o unintentional weight loss of 60lbs over the past 6 months. She c/o poor appetite and early satiety.  Of note pt has presented to multiple EDs for persistent back pain most recently presented to [facility] (6/4 - 6/6). Pt was with PMD follow up and Pain management f/up. Patient has reportedly had several CTs and lower back MRI which have been unremarkable. She was scheduled for repeat outpatient MRI today. 29F with hx of IDDM on Insulin pump who presents with c/o  worsening abdominal pain and back pain x several months. Pt also reports unintentional weight loss.  Back pain is bilateral, described as achy to sharp, 10/10 at its worst. She denies aggravating factors and reports that is transiently relieved by Percocet. Pt denies tingling/numbness, urine/bowel incontinence. She reports that the pain radiates to the abdomen. Denies nausea/vomiting, fever/chills, diarrhea or urinary symptoms. She c/o unintentional weight loss of 60lbs over the past 6 months. She c/o poor appetite and early satiety.  Of note pt has presented to multiple EDs for persistent back pain most recently presented to [facility] (6/4 - 6/6). Pt was discharged on Percocet and Robaxin to follow up with  PMD and Pain management . She is yet to see Pain mgt. Also states that Robaxin did not relieve her pain.  Patient has reportedly had several CTs and lower back MRI which have been unremarkable. She was scheduled for repeat outpatient MRI today.    ED COURSE  VS : 122/88 129  20  O2 99% on room air T 98.3F  Labs : wbc 12  h/h 13/42  plt 381  UA : negative for UTI  Treatment : IVF NS 1L x 2, Percocet 5/325mg PO x 2

## 2019-06-12 NOTE — CONSULT NOTE ADULT - SUBJECTIVE AND OBJECTIVE BOX
Reason For Consult: DM    HPI:  29F with hx of IDDM on Insulin pump who presents with c/o  worsening abdominal pain and back pain x several months. Pt also reports unintentional weight loss.  Back pain is bilateral, described as achy to sharp, 10/10 at its worst. She denies aggravating factors and reports that is transiently relieved by Percocet. Pt denies tingling/numbness, urine/bowel incontinence. She reports that the pain radiates to the abdomen. Denies nausea/vomiting, fever/chills, diarrhea or urinary symptoms. She c/o unintentional weight loss of 60lbs over the past 6 months. She c/o poor appetite and early satiety.  Of note pt has presented to multiple EDs for persistent back pain most recently presented to [facility] (6/4 - 6/6). Pt was discharged on Percocet and Robaxin to follow up with  PMD and Pain management . She is yet to see Pain mgt. Also states that Robaxin did not relieve her pain.  Patient has reportedly had several CTs and lower back MRI which have been unremarkable. She was scheduled for repeat outpatient MRI today.    ED COURSE  VS : 122/88 129  20  O2 99% on room air T 98.3F  Labs : wbc 12  h/h 13/42  plt 381  UA : negative for UTI  Treatment : IVF NS 1L x 2, Percocet 5/325mg PO x 2 (12 Jun 2019 08:59)      endocrine called for DM assistance   She follows with endocrinologist Dr. Horowitz.   She was diagnosed on routine labs 7 years ago and was hospitalized with DKA only once a year later. She was told that her ab were negative. She has been on an omnipod for the past 2 months. She was due to change one day ago but her pain was so great that she did not put a new POD on. She does not know her settings and left her PDM at home. She also takes 1/2 tablet of metfromin 1000mg po bid.   She was recently admitted to [facility] for similar symtoms that brings her in this visit. She does not have her pump with her or supplies.  She was started on lantus last night and had one low within 30 min.  She is eating well today.  no n/v   FSBG well controlled today         PAST MEDICAL & SURGICAL HISTORY:  Diabetes mellitus  No significant past surgical history      FAMILY HISTORY:  FHx: pancreatic cancer      Social History:  no illicit     Outpatient Medications:  Humalog per the pump  metformin    MEDICATIONS  (STANDING):  dextrose 5%. 1000 milliLiter(s) (50 mL/Hr) IV Continuous <Continuous>  dextrose 50% Injectable 12.5 Gram(s) IV Push once  dextrose 50% Injectable 25 Gram(s) IV Push once  dextrose 50% Injectable 25 Gram(s) IV Push once  docusate sodium 100 milliGRAM(s) Oral two times a day  insulin glargine Injectable (LANTUS) 8 Unit(s) SubCutaneous at bedtime  insulin lispro (HumaLOG) corrective regimen sliding scale   SubCutaneous three times a day before meals  insulin lispro (HumaLOG) corrective regimen sliding scale   SubCutaneous at bedtime  insulin lispro Injectable (HumaLOG) 2 Unit(s) SubCutaneous three times a day before meals  senna 2 Tablet(s) Oral at bedtime    MEDICATIONS  (PRN):  dextrose 40% Gel 15 Gram(s) Oral once PRN Blood Glucose LESS THAN 70 milliGRAM(s)/deciliter  glucagon  Injectable 1 milliGRAM(s) IntraMuscular once PRN Glucose LESS THAN 70 milligrams/deciliter  oxyCODONE    5 mG/acetaminophen 325 mG 1 Tablet(s) Oral every 4 hours PRN Severe Pain (7 - 10)      Allergies    No Known Allergies    Intolerances      Review of Systems:  Constitutional: No fever  Eyes: No blurry vision  Neuro: No tremors  HEENT: No pain  Cardiovascular: No chest pain, palpitations  Respiratory: No SOB, no cough  GI: No nausea, vomiting, abdominal pain  : No dysuria  Skin: no rash  Psych: no depression  Endocrine: no polyuria, polydipsia  Hem/lymph: no swelling  Osteoporosis: no fractures    ALL OTHER SYSTEMS REVIEWED AND NEGATIVE        PHYSICAL EXAM:  VITALS: T(C): 36.9 (06-12-19 @ 13:15)  T(F): 98.4 (06-12-19 @ 13:15), Max: 98.4 (06-12-19 @ 13:15)  HR: 101 (06-12-19 @ 13:20) (98 - 129)  BP: 128/88 (06-12-19 @ 13:15) (119/80 - 136/90)  RR:  (16 - 20)  SpO2:  (99% - 100%)  Wt(kg): --  GENERAL: NAD, well-groomed, well-developed  EYES: No proptosis, no lid lag, anicteric  HEENT:  Atraumatic, Normocephalic, moist mucous membranes  THYROID: Normal size, no palpable nodules  RESPIRATORY: Clear to auscultation bilaterally; No rales, rhonchi, wheezing, or rubs  CARDIOVASCULAR: Regular rate and rhythm; No murmurs; no peripheral edema  GI: Soft, nontender, non distended, normal bowel sounds  SKIN: Dry, intact, No rashes or lesions  MUSCULOSKELETAL: Full range of motion, normal strength  NEURO: sensation intact, extraocular movements intact, no tremor, normal reflexes  PSYCH: Alert and oriented x 3, normal affect, normal mood      POCT Blood Glucose.: 205 mg/dL (06-12-19 @ 16:02)  POCT Blood Glucose.: 173 mg/dL (06-12-19 @ 11:30)  POCT Blood Glucose.: 160 mg/dL (06-12-19 @ 07:33)  POCT Blood Glucose.: 130 mg/dL (06-12-19 @ 05:58)  POCT Blood Glucose.: 118 mg/dL (06-12-19 @ 03:53)  POCT Blood Glucose.: 115 mg/dL (06-12-19 @ 02:54)  POCT Blood Glucose.: 64 mg/dL (06-12-19 @ 01:42)  POCT Blood Glucose.: 102 mg/dL (06-12-19 @ 00:36)  POCT Blood Glucose.: 66 mg/dL (06-11-19 @ 23:28)                            13.9   12.2  )-----------( 381      ( 12 Jun 2019 00:10 )             42.4       06-12    141  |  101  |  11  ----------------------------<  69<L>  3.7   |  27  |  0.51    EGFR if : 151  EGFR if non : 130    Ca    10.4      06-12    TPro  7.8  /  Alb  4.4  /  TBili  0.3  /  DBili  x   /  AST  32  /  ALT  52<H>  /  AlkPhos  52  06-12      Thyroid Function Tests:      Hemoglobin A1C, Whole Blood: 9.5 % <H> [4.0 - 5.6] (06-05-19 @ 08:32)          Radiology:

## 2019-06-12 NOTE — H&P ADULT - PROBLEM SELECTOR PLAN 4
- unclear etiology ?hemoconcentration  - pt has no signs/symptoms of infection  - denies fever/chills, dysuria, hematuria, cough, diarrhea  - CT abd does show Left sided pyelonephritis /scarring  - reports being treated for UTI  at [facility] 2 months ago ; reports that she was  asymptomatic and states that CT showed pyelonephritis at that time as well  - will f/up Urine cx  - hold off on abx for now

## 2019-06-12 NOTE — CONSULT NOTE ADULT - ATTENDING COMMENTS
Maggie Chandra MD  Pager [phone] ([facility])/ [phone] ([facility]) [please provide 10 digit call back number]  Nights and weekends: [phone]  Please note that this patient may be followed by a different provider tomorrow. If no answer or after hours, please contact [phone].  For final dc reccomendations, please call [phone] or page the endocrine fellow on call.

## 2019-06-12 NOTE — H&P ADULT - NSHPLABSRESULTS_GEN_ALL_CORE
Labs reviewed : leukocytosis, UA negative for UTI, no anemia, BMP is wnl    CT Abd/pelvis personally reviewed :   Left-sided pyelonephritis versus scarring; correlate with urinalysis.  Small fat-containing supraumbilical hernia which may be mildly inflamed.  A few tiny nonspecific foci of air are noted in the left hemiabdomen   subcutaneous fat; correlate for recent subcutaneous injection.

## 2019-06-12 NOTE — CONSULT NOTE ADULT - ASSESSMENT
28 yo female with hx of ketosis-prone dm uncontrolled (managed with Omnipod) A1C 9.5, X 7 years without complication readmitted with   c/o  worsening abdominal pain and back pain x several months.   endocrine consulted for diabetes mgt.       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      DISPO: f/u with Dr. Horowitz and resume pump upon discharge. I have asked the pt to bring pump   pt also knows to bring in supplies.  She has a appt with her endocrinologist next week.        #HCM:  HLD /urine MA  pt needs fasting lipids and urine ma/cr outpt

## 2019-06-12 NOTE — H&P ADULT - PROBLEM SELECTOR PLAN 3
- had 2 hypoglycemic episodes in the ED 66, 64  - asymptomatic  - off Insulin pump at this time  - c/w LEAH, Lantus as above  - Endocrinology eval

## 2019-06-12 NOTE — H&P ADULT - PROBLEM SELECTOR PLAN 2
- on Insulin pump at home  - was removed prior to CT scan  - will c/w LEAH and Lantus 10U qhs for now  - Endocrinology consult pending

## 2019-06-12 NOTE — H&P ADULT - ASSESSMENT
29F with hx of IDDM on Insulin pump, multiple ED visits for persistent back pain  who presents with c/o  worsening abdominal pain and back pain x several months.

## 2019-06-13 ENCOUNTER — TRANSCRIPTION ENCOUNTER (OUTPATIENT)
Age: 29
End: 2019-06-13

## 2019-06-13 VITALS
OXYGEN SATURATION: 99 % | HEART RATE: 105 BPM | SYSTOLIC BLOOD PRESSURE: 134 MMHG | DIASTOLIC BLOOD PRESSURE: 89 MMHG | RESPIRATION RATE: 18 BRPM

## 2019-06-13 LAB
ANION GAP SERPL CALC-SCNC: 14 MMOL/L — SIGNIFICANT CHANGE UP (ref 5–17)
BUN SERPL-MCNC: 9 MG/DL — SIGNIFICANT CHANGE UP (ref 7–23)
CALCIUM SERPL-MCNC: 9.5 MG/DL — SIGNIFICANT CHANGE UP (ref 8.4–10.5)
CHLORIDE SERPL-SCNC: 100 MMOL/L — SIGNIFICANT CHANGE UP (ref 96–108)
CO2 SERPL-SCNC: 26 MMOL/L — SIGNIFICANT CHANGE UP (ref 22–31)
CREAT SERPL-MCNC: 0.58 MG/DL — SIGNIFICANT CHANGE UP (ref 0.5–1.3)
GLUCOSE BLDC GLUCOMTR-MCNC: 149 MG/DL — HIGH (ref 70–99)
GLUCOSE BLDC GLUCOMTR-MCNC: 159 MG/DL — HIGH (ref 70–99)
GLUCOSE BLDC GLUCOMTR-MCNC: 161 MG/DL — HIGH (ref 70–99)
GLUCOSE BLDC GLUCOMTR-MCNC: 185 MG/DL — HIGH (ref 70–99)
GLUCOSE SERPL-MCNC: 159 MG/DL — HIGH (ref 70–99)
HCT VFR BLD CALC: 37.6 % — SIGNIFICANT CHANGE UP (ref 34.5–45)
HGB BLD-MCNC: 12.2 G/DL — SIGNIFICANT CHANGE UP (ref 11.5–15.5)
MCHC RBC-ENTMCNC: 28.6 PG — SIGNIFICANT CHANGE UP (ref 27–34)
MCHC RBC-ENTMCNC: 32.4 GM/DL — SIGNIFICANT CHANGE UP (ref 32–36)
MCV RBC AUTO: 88.1 FL — SIGNIFICANT CHANGE UP (ref 80–100)
PLATELET # BLD AUTO: 319 K/UL — SIGNIFICANT CHANGE UP (ref 150–400)
POTASSIUM SERPL-MCNC: 3.8 MMOL/L — SIGNIFICANT CHANGE UP (ref 3.5–5.3)
POTASSIUM SERPL-SCNC: 3.8 MMOL/L — SIGNIFICANT CHANGE UP (ref 3.5–5.3)
RBC # BLD: 4.27 M/UL — SIGNIFICANT CHANGE UP (ref 3.8–5.2)
RBC # FLD: 11.6 % — SIGNIFICANT CHANGE UP (ref 10.3–14.5)
SODIUM SERPL-SCNC: 140 MMOL/L — SIGNIFICANT CHANGE UP (ref 135–145)
WBC # BLD: 7.78 K/UL — SIGNIFICANT CHANGE UP (ref 3.8–10.5)
WBC # FLD AUTO: 7.78 K/UL — SIGNIFICANT CHANGE UP (ref 3.8–10.5)

## 2019-06-13 PROCEDURE — 99232 SBSQ HOSP IP/OBS MODERATE 35: CPT

## 2019-06-13 RX ORDER — INSULIN LISPRO 100/ML
3 VIAL (ML) SUBCUTANEOUS
Refills: 0 | Status: DISCONTINUED | OUTPATIENT
Start: 2019-06-13 | End: 2019-06-13

## 2019-06-13 RX ORDER — DOCUSATE SODIUM 100 MG
1 CAPSULE ORAL
Qty: 0 | Refills: 0 | DISCHARGE
Start: 2019-06-13

## 2019-06-13 RX ORDER — SENNA PLUS 8.6 MG/1
2 TABLET ORAL
Qty: 0 | Refills: 0 | DISCHARGE
Start: 2019-06-13

## 2019-06-13 RX ORDER — POLYETHYLENE GLYCOL 3350 17 G/17G
17 POWDER, FOR SOLUTION ORAL ONCE
Refills: 0 | Status: COMPLETED | OUTPATIENT
Start: 2019-06-13 | End: 2019-06-13

## 2019-06-13 RX ORDER — TRAMADOL HYDROCHLORIDE 50 MG/1
1 TABLET ORAL
Qty: 0 | Refills: 0 | DISCHARGE
Start: 2019-06-13

## 2019-06-13 RX ORDER — HEPARIN SODIUM 5000 [USP'U]/ML
5000 INJECTION INTRAVENOUS; SUBCUTANEOUS EVERY 12 HOURS
Refills: 0 | Status: DISCONTINUED | OUTPATIENT
Start: 2019-06-13 | End: 2019-06-13

## 2019-06-13 RX ORDER — TRAMADOL HYDROCHLORIDE 50 MG/1
50 TABLET ORAL
Refills: 0 | Status: DISCONTINUED | OUTPATIENT
Start: 2019-06-13 | End: 2019-06-13

## 2019-06-13 RX ORDER — POLYETHYLENE GLYCOL 3350 17 G/17G
17 POWDER, FOR SOLUTION ORAL DAILY
Refills: 0 | Status: DISCONTINUED | OUTPATIENT
Start: 2019-06-13 | End: 2019-06-13

## 2019-06-13 RX ORDER — TRAMADOL HYDROCHLORIDE 50 MG/1
1 TABLET ORAL
Qty: 10 | Refills: 0
Start: 2019-06-13 | End: 2019-06-17

## 2019-06-13 RX ADMIN — OXYCODONE AND ACETAMINOPHEN 1 TABLET(S): 5; 325 TABLET ORAL at 03:04

## 2019-06-13 RX ADMIN — TRAMADOL HYDROCHLORIDE 50 MILLIGRAM(S): 50 TABLET ORAL at 14:18

## 2019-06-13 RX ADMIN — OXYCODONE AND ACETAMINOPHEN 1 TABLET(S): 5; 325 TABLET ORAL at 12:55

## 2019-06-13 RX ADMIN — Medication 1: at 11:38

## 2019-06-13 RX ADMIN — OXYCODONE AND ACETAMINOPHEN 1 TABLET(S): 5; 325 TABLET ORAL at 04:05

## 2019-06-13 RX ADMIN — OXYCODONE AND ACETAMINOPHEN 1 TABLET(S): 5; 325 TABLET ORAL at 08:31

## 2019-06-13 RX ADMIN — Medication 100 MILLIGRAM(S): at 05:17

## 2019-06-13 RX ADMIN — Medication 1: at 16:38

## 2019-06-13 RX ADMIN — OXYCODONE AND ACETAMINOPHEN 1 TABLET(S): 5; 325 TABLET ORAL at 12:25

## 2019-06-13 RX ADMIN — Medication 2 UNIT(S): at 07:55

## 2019-06-13 RX ADMIN — Medication 2 UNIT(S): at 11:38

## 2019-06-13 RX ADMIN — Medication 3 UNIT(S): at 16:38

## 2019-06-13 RX ADMIN — POLYETHYLENE GLYCOL 3350 17 GRAM(S): 17 POWDER, FOR SOLUTION ORAL at 10:52

## 2019-06-13 RX ADMIN — TRAMADOL HYDROCHLORIDE 50 MILLIGRAM(S): 50 TABLET ORAL at 14:48

## 2019-06-13 RX ADMIN — OXYCODONE AND ACETAMINOPHEN 1 TABLET(S): 5; 325 TABLET ORAL at 08:01

## 2019-06-13 NOTE — DISCHARGE NOTE NURSING/CASE MANAGEMENT/SOCIAL WORK - NSDCFUADDAPPT_GEN_ALL_CORE_FT
Provider Comments  Provider Comments:   PATIENT DID NOT SHOW FOR 10/27/16 APPT        Signatures   Electronically signed by : [facility]; Oct 27 2016 11:37AM EST                       (Co-author) Follow-up with your primary care physician within 1 week. Call for appointment.

## 2019-06-13 NOTE — PHYSICAL THERAPY INITIAL EVALUATION ADULT - PERTINENT HX OF CURRENT PROBLEM, REHAB EVAL
29F admitted on 6/12/19 with hx of IDDM on Insulin pump, multiple ED visits for persistent back pain  who presents with c/o  worsening abdominal pain and back pain x several months.

## 2019-06-13 NOTE — DISCHARGE NOTE PROVIDER - HOSPITAL COURSE
29F with hx of IDDM on Insulin pump who presents with c/o  worsening abdominal pain and back pain x several months. Pt also reports unintentional weight loss.  Back pain is bilateral, described as achy to sharp, 10/10 at its worst. She denies aggravating factors and reports that is transiently relieved by Percocet. Pt denies tingling/numbness, urine/bowel incontinence. She reports that the pain radiates to the abdomen. Denies nausea/vomiting, fever/chills, diarrhea or urinary symptoms. She c/o unintentional weight loss of 60lbs over the past 6 months. She c/o poor appetite and early satiety.  Of note pt has presented to multiple EDs for persistent back pain most recently presented to [facility] (6/4 - 6/6). Pt was discharged on Percocet and Robaxin to follow up with  PMD and Pain management . She is yet to see Pain mgt. Also states that Robaxin did not relieve her pain.  Patient has reportedly had several CTs and lower back MRI which have been unremarkable.    f/u with Dr. Horowitz and resume pump upon discharge. I have asked the pt to bring pump     pt also knows to bring in supplies.    She has a appt with her endocrinologist next week. 29F with hx of IDDM on Insulin pump who presented with complaint of  hypoglycemia and worsening abdominal pain and back pain for several months. Pt also reported unintentional weight loss.  Back pain is bilateral, described as achy to sharp, 10/10 at its worst. She denies aggravating factors and reports that is transiently relieved by Percocet. Pt denies tingling/numbness, urine/bowel incontinence. She reports that the pain radiates to the abdomen. Denies nausea/vomiting, fever/chills, diarrhea or urinary symptoms. She c/o unintentional weight loss of 60lbs over the past 6 months. She c/o poor appetite and early satiety.  Of note pt has presented to multiple EDs for persistent back pain most recently presented to [facility] (6/4 - 6/6). Pt was discharged on Percocet and Robaxin to follow up with  PMD and Pain management . She is yet to see Pain mgt. Also states that Robaxin did not relieve her pain.  Patient has reportedly had several CTs and lower back MRI which have been unremarkable. MRI lumbar spine  with and without IV Cont (06.12.19 @ 22:21 shows :  VERTEBRAL BODIES AND DISCS:  Normal.    ALIGNMENT:  No subluxations.    L1-L2 LEVEL:  Normal.    L2-L3 LEVEL:  Normal.    L3-L4 LEVEL:  Normal.    L4-L5 LEVEL:  Normal.    L5-S1 LEVEL:  Normal.    SPINAL CANAL:  No other intradural or extradural defects are seen.     CONUS MEDULLARIS:  Normal.    MISCELLANEOUS:  None.        IMPRESSION:  Unremarkable MRI of the lumbar spine.        Pt seen by endocrinology and adjusted basal/bolus with corrective Humalog . Pt will  resume pump upon discharge and follow-up with primary endocrinologist Dr. Horowitz next week. Pt also knows to bring in supplies.        Hospital course complicated by constipation, now on bowel regimen.         Pt is cleared for discharge by Dr. Juarez. Follow-up with PMD . Continue with Tramadol for pain regimen. 29F with hx of IDDM on Insulin pump who presented with complaint of  hypoglycemia and worsening abdominal pain and back pain for several months. Pt also reported unintentional weight loss.  Back pain is bilateral, described as achy to sharp, 10/10 at its worst. She denies aggravating factors and reports that is transiently relieved by Percocet. Pt denies tingling/numbness, urine/bowel incontinence. She reports that the pain radiates to the abdomen. Denies nausea/vomiting, fever/chills, diarrhea or urinary symptoms. She c/o unintentional weight loss of 60lbs over the past 6 months. She c/o poor appetite and early satiety.  Of note pt has presented to multiple EDs for persistent back pain most recently presented to [facility] (6/4 - 6/6). Pt was discharged on Percocet and Robaxin to follow up with  PMD and Pain management . She is yet to see Pain mgt. Also states that Robaxin did not relieve her pain.  Patient has reportedly had several CTs and lower back MRI which have been unremarkable. MRI lumbar spine  with and without IV Cont (06.12.19 @ 22:21 shows :  VERTEBRAL BODIES AND DISCS:  Normal.    ALIGNMENT:  No subluxations.    L1-L2 LEVEL:  Normal.    L2-L3 LEVEL:  Normal.    L3-L4 LEVEL:  Normal.    L4-L5 LEVEL:  Normal.    L5-S1 LEVEL:  Normal.    SPINAL CANAL:  No other intradural or extradural defects are seen.     CONUS MEDULLARIS:  Normal.    MISCELLANEOUS:  None.        IMPRESSION:  Unremarkable MRI of the lumbar spine.        Pt seen by endocrinology and adjusted basal/bolus with corrective Humalog . Pt will  resume pump upon discharge and follow-up with primary endocrinologist Dr. Horowitz next week. Pt also knows to bring in supplies.        Hospital course complicated by constipation, now on bowel regimen.         Pt is cleared for discharge by Dr. Juarez. Follow-up with PMD . Continue with Tramadol for pain regimen, outpatient PT per recommendations.

## 2019-06-13 NOTE — PROGRESS NOTE ADULT - PROBLEM SELECTOR PLAN 1
-RDE consult inpt  -Lantus 10 units sq qhs  -humalog 3 units tid-ac with small correctional scale tid-ac/qhs  DISPO: f/u with Dr. Horowitz and resume pump upon discharge. I have asked the pt to bring pump supplies. If has pump supplies and insulin at home can resume the pump once she gets home as basal insulin will cover her until this evening.

## 2019-06-13 NOTE — DISCHARGE NOTE PROVIDER - CARE PROVIDER_API CALL
Fco Juarez)  Internal Medicine  [address] [place] [address]  Phone: [phone]  Fax: [phone]  Follow Up Time: 1 week    Edith Horowitz ()  EndocrinologyMetabDiabetes; Internal Medicine  [address] [place] [address]  Phone: [phone]  Fax: [phone]  Follow Up Time: 1 week    Agnes Urrutia)  Family Medicine  [address] [place] [address]  Phone: [phone]  Fax: [phone]  Follow Up Time: 1 week

## 2019-06-13 NOTE — DISCHARGE NOTE NURSING/CASE MANAGEMENT/SOCIAL WORK - NSCORESITESY/N_GEN_A_CORE_RD
Patient notified by phone.  She said she would like to try 40 mg once per day.   Pharmacy said 40 mg per day would be covered by insurance.    Huddled with JV and she gave verbal order for Omeprazole 40 mg once per day.  Order sent to pharmacy.    CRUZITO Navarrete, RN, N  [facility]) [phone]       No

## 2019-06-13 NOTE — DISCHARGE NOTE PROVIDER - NSDCCPCAREPLAN_GEN_ALL_CORE_FT
PRINCIPAL DISCHARGE DIAGNOSIS  Diagnosis: Hypoglycemia  Assessment and Plan of Treatment: Resolved.  HgA1C 9.5 this past month.  Make sure you get your HgA1c checked every three months.  If you take oral diabetes medications, check your blood glucose two times a day.  If you take insulin, check your blood glucose before meals and at bedtime.  It's important not to skip any meals.  Keep a log of your blood glucose results and always take it with you to your doctor appointments.  Keep a list of your current medications including injectables and over the counter medications and bring this medication list with you to all your doctor appointments.  If you have not seen your ophthalmologist this year call for appointment.  Check your feet daily for redness, sores, or openings. Do not self treat. If no improvement in two days call your primary care physician for an appointment.  Low blood sugar (hypoglycemia) is a blood sugar below 70mg/dl. Check your blood sugar if you feel signs/symptoms of hypoglycemia. If your blood sugar is below 70 take 15 grams of carbohydrates (ex 4 oz of apple juice, 3-4 glucose tablets, or 4-6 oz of regular soda) wait 15 minutes and repeat blood sugar to make sure it comes up above 70.  If your blood sugar is above 70 and you are due for a meal, have a meal.  If you are not due for a meal have a snack.  This snack helps keeps your blood sugar at a safe range.        SECONDARY DISCHARGE DIAGNOSES  Diagnosis: Low back pain  Assessment and Plan of Treatment: Imaging unremarkable.  Continue pain regimen and activity as tolerated.  Follow-up with your primary care physician within 1 week. Call for appointment. PRINCIPAL DISCHARGE DIAGNOSIS  Diagnosis: Hypoglycemia  Assessment and Plan of Treatment: Resolved.  HgA1C 9.5 this past month.  Make sure you get your HgA1c checked every three months.  Resume your insulin pump settings as before. Follow-up with Dr. Horowitz next week. Call office for appointment.   If you take oral diabetes medications, check your blood glucose two times a day.  If you take insulin, check your blood glucose before meals and at bedtime.  It's important not to skip any meals.  Keep a log of your blood glucose results and always take it with you to your doctor appointments.  Keep a list of your current medications including injectables and over the counter medications and bring this medication list with you to all your doctor appointments.  If you have not seen your ophthalmologist this year call for appointment.  Check your feet daily for redness, sores, or openings. Do not self treat. If no improvement in two days call your primary care physician for an appointment.  Low blood sugar (hypoglycemia) is a blood sugar below 70mg/dl. Check your blood sugar if you feel signs/symptoms of hypoglycemia. If your blood sugar is below 70 take 15 grams of carbohydrates (ex 4 oz of apple juice, 3-4 glucose tablets, or 4-6 oz of regular soda) wait 15 minutes and repeat blood sugar to make sure it comes up above 70.  If your blood sugar is above 70 and you are due for a meal, have a meal.  If you are not due for a meal have a snack.  This snack helps keeps your blood sugar at a safe range.        SECONDARY DISCHARGE DIAGNOSES  Diagnosis: Low back pain  Assessment and Plan of Treatment: Imaging unremarkable.  Continue pain regimen and physical therapy as instructed.   Follow-up with your primary care physician within 1 week. Call for appointment.

## 2019-06-13 NOTE — PROGRESS NOTE ADULT - ASSESSMENT
28 yo female with hx of ketosis-prone dm uncontrolled (managed with Omnipod) A1C 9.5, X 7 years without complication here with 1 day of intractable 10/10 lower back pain, endocrine consulted for diabetes mgt.   undergoing workup.

## 2019-06-13 NOTE — DISCHARGE NOTE PROVIDER - PROVIDER TOKENS
PROVIDER:[TOKEN:[[id number]],FOLLOWUP:[1 week]],PROVIDER:[TOKEN:[[id number]],FOLLOWUP:[1 week]],PROVIDER:[TOKEN:[[id number]],FOLLOWUP:[1 week]]

## 2019-06-13 NOTE — PROGRESS NOTE ADULT - SUBJECTIVE AND OBJECTIVE BOX
Chief Complaint: Evaluating this 30 y/o F for uncontrolled ketosis prone DM w/ hyperglycemia      Interval History: Feeling better. + po intake. Possible discharge home today.     MEDICATIONS  (STANDING):  dextrose 5%. 1000 milliLiter(s) (50 mL/Hr) IV Continuous <Continuous>  dextrose 50% Injectable 12.5 Gram(s) IV Push once  dextrose 50% Injectable 25 Gram(s) IV Push once  dextrose 50% Injectable 25 Gram(s) IV Push once  docusate sodium 100 milliGRAM(s) Oral two times a day  heparin  Injectable 5000 Unit(s) SubCutaneous every 12 hours  insulin glargine Injectable (LANTUS) 8 Unit(s) SubCutaneous at bedtime  insulin lispro (HumaLOG) corrective regimen sliding scale   SubCutaneous three times a day before meals  insulin lispro (HumaLOG) corrective regimen sliding scale   SubCutaneous at bedtime  insulin lispro Injectable (HumaLOG) 3 Unit(s) SubCutaneous three times a day before meals  senna 2 Tablet(s) Oral at bedtime    MEDICATIONS  (PRN):  dextrose 40% Gel 15 Gram(s) Oral once PRN Blood Glucose LESS THAN 70 milliGRAM(s)/deciliter  glucagon  Injectable 1 milliGRAM(s) IntraMuscular once PRN Glucose LESS THAN 70 milligrams/deciliter  polyethylene glycol 3350 17 Gram(s) Oral daily PRN Constipation  traMADol 50 milliGRAM(s) Oral two times a day PRN Severe Pain (7 - 10)      Allergies    No Known Allergies    Intolerances      Review of Systems:  Constitutional: No fever  Eyes: No blurry vision  Cardiovascular: No chest pain  Respiratory: No SOB  GI: No abdominal pain, No nausea, No vomiting  Endocrine: as noted in HPI    All other negative      PHYSICAL EXAM:  VITALS: T(C): 36.6 (06-13-19 @ 13:23)  T(F): 97.9 (06-13-19 @ 13:23), Max: 98.5 (06-12-19 @ 22:05)  HR: 105 (06-13-19 @ 16:27) (105 - 107)  BP: 134/89 (06-13-19 @ 16:27) (121/84 - 139/93)  RR:  (18 - 18)  SpO2:  (99% - 100%)  Wt(kg): --  GENERAL: NAD at this time  EYES: EOMI, No proptosis  HEENT:  Atraumatic, Normocephalic,   RESPIRATORY: Clear to auscultation bilaterally, full excursion, non labored  CARDIOVASCULAR: Regular rhythm; normal S1/S2, no peripheral edema  GI: Soft, nontender, non distended, normal bowel sounds  SKIN: Warm and dry  PSYCH: normal affect, normal mood      POCT Blood Glucose.: 161 mg/dL (06-13-19 @ 16:15)  POCT Blood Glucose.: 185 mg/dL (06-13-19 @ 11:24)  POCT Blood Glucose.: 149 mg/dL (06-13-19 @ 07:46)  POCT Blood Glucose.: 159 mg/dL (06-13-19 @ 03:00)  POCT Blood Glucose.: 183 mg/dL (06-12-19 @ 22:06)  POCT Blood Glucose.: 205 mg/dL (06-12-19 @ 16:02)  POCT Blood Glucose.: 173 mg/dL (06-12-19 @ 11:30)  POCT Blood Glucose.: 160 mg/dL (06-12-19 @ 07:33)  POCT Blood Glucose.: 130 mg/dL (06-12-19 @ 05:58)  POCT Blood Glucose.: 118 mg/dL (06-12-19 @ 03:53)  POCT Blood Glucose.: 115 mg/dL (06-12-19 @ 02:54)  POCT Blood Glucose.: 64 mg/dL (06-12-19 @ 01:42)  POCT Blood Glucose.: 102 mg/dL (06-12-19 @ 00:36)  POCT Blood Glucose.: 66 mg/dL (06-11-19 @ 23:28)        06-13    140  |  100  |  9   ----------------------------<  159<H>  3.8   |  26  |  0.58    EGFR if : 144  EGFR if non : 125    Ca    9.5      06-13    TPro  7.8  /  Alb  4.4  /  TBili  0.3  /  DBili  x   /  AST  32  /  ALT  52<H>  /  AlkPhos  52  06-12        Thyroid Function Tests:      Hemoglobin A1C, Whole Blood: 9.5 % <H> [4.0 - 5.6] (06-05-19 @ 08:32)

## 2019-06-13 NOTE — DISCHARGE NOTE NURSING/CASE MANAGEMENT/SOCIAL WORK - NSDCDPATPORTLINK_GEN_ALL_CORE
You can access the [facility] Patient Portal, offered by [facility], by registering with the following website: [email or web address]

## 2019-06-14 DIAGNOSIS — Z71.89 OTHER SPECIFIED COUNSELING: ICD-10-CM

## 2019-06-19 ENCOUNTER — RESULT REVIEW (OUTPATIENT)
Age: 29
End: 2019-06-19

## 2019-06-27 NOTE — PATIENT PROFILE ADULT - NSPRONUTRITIONRISK_GEN_A_NUR
Recommendation: Continue full liquid diet and advance as tolerated by pt  Intervention:  Monitor hydration status   Goals: Keep PO intake >85% while in patient  Nutrition Goal Status: new  Nutrition Discharge Planning: Regular diet or diet as tolerated by pt          Dipti Rodríguez, Provisional LDN   No indicators present

## 2019-07-02 ENCOUNTER — RESULT REVIEW (OUTPATIENT)
Age: 29
End: 2019-07-02

## 2019-07-10 PROCEDURE — 99285 EMERGENCY DEPT VISIT HI MDM: CPT | Mod: 25

## 2019-07-10 PROCEDURE — 72158 MRI LUMBAR SPINE W/O & W/DYE: CPT

## 2019-07-10 PROCEDURE — 97161 PT EVAL LOW COMPLEX 20 MIN: CPT

## 2019-07-10 PROCEDURE — 80048 BASIC METABOLIC PNL TOTAL CA: CPT

## 2019-07-10 PROCEDURE — 80053 COMPREHEN METABOLIC PANEL: CPT

## 2019-07-10 PROCEDURE — 96360 HYDRATION IV INFUSION INIT: CPT | Mod: XU

## 2019-07-10 PROCEDURE — G0378: CPT

## 2019-07-10 PROCEDURE — 83690 ASSAY OF LIPASE: CPT

## 2019-07-10 PROCEDURE — A9585: CPT

## 2019-07-10 PROCEDURE — 96361 HYDRATE IV INFUSION ADD-ON: CPT

## 2019-07-10 PROCEDURE — 82962 GLUCOSE BLOOD TEST: CPT

## 2019-07-10 PROCEDURE — 74177 CT ABD & PELVIS W/CONTRAST: CPT

## 2019-07-10 PROCEDURE — 81001 URINALYSIS AUTO W/SCOPE: CPT

## 2019-07-10 PROCEDURE — 85027 COMPLETE CBC AUTOMATED: CPT

## 2019-08-02 ENCOUNTER — OUTPATIENT (OUTPATIENT)
Dept: OUTPATIENT SERVICES | Facility: HOSPITAL | Age: 29
LOS: 1 days | End: 2019-08-02
Payer: MEDICAID

## 2019-08-02 DIAGNOSIS — R10.84 GENERALIZED ABDOMINAL PAIN: ICD-10-CM

## 2019-08-02 DIAGNOSIS — R63.4 ABNORMAL WEIGHT LOSS: ICD-10-CM

## 2019-08-02 DIAGNOSIS — R68.81 EARLY SATIETY: ICD-10-CM

## 2019-08-02 PROCEDURE — 78264 GASTRIC EMPTYING IMG STUDY: CPT | Mod: 26,GC

## 2019-08-02 PROCEDURE — 78264 GASTRIC EMPTYING IMG STUDY: CPT

## 2019-08-02 PROCEDURE — 82962 GLUCOSE BLOOD TEST: CPT

## 2019-08-02 PROCEDURE — A9541: CPT

## 2019-08-08 PROBLEM — Z00.00 ENCOUNTER FOR PREVENTIVE HEALTH EXAMINATION: Status: ACTIVE | Noted: 2019-08-08

## 2019-08-13 ENCOUNTER — OUTPATIENT (OUTPATIENT)
Dept: OUTPATIENT SERVICES | Facility: HOSPITAL | Age: 29
LOS: 1 days | End: 2019-08-13

## 2019-08-13 ENCOUNTER — APPOINTMENT (OUTPATIENT)
Dept: MRI IMAGING | Facility: CLINIC | Age: 29
End: 2019-08-13
Payer: MEDICAID

## 2019-08-13 DIAGNOSIS — R16.0 HEPATOMEGALY, NOT ELSEWHERE CLASSIFIED: ICD-10-CM

## 2019-08-13 PROCEDURE — 74183 MRI ABD W/O CNTR FLWD CNTR: CPT | Mod: 26

## 2019-10-31 PROCEDURE — 86140 C-REACTIVE PROTEIN: CPT

## 2019-10-31 PROCEDURE — 82962 GLUCOSE BLOOD TEST: CPT

## 2019-10-31 PROCEDURE — 82330 ASSAY OF CALCIUM: CPT

## 2019-10-31 PROCEDURE — 82803 BLOOD GASES ANY COMBINATION: CPT

## 2019-10-31 PROCEDURE — 83605 ASSAY OF LACTIC ACID: CPT

## 2019-10-31 PROCEDURE — 83036 HEMOGLOBIN GLYCOSYLATED A1C: CPT

## 2019-10-31 PROCEDURE — 85027 COMPLETE CBC AUTOMATED: CPT

## 2019-10-31 PROCEDURE — 84295 ASSAY OF SERUM SODIUM: CPT

## 2019-10-31 PROCEDURE — 96374 THER/PROPH/DIAG INJ IV PUSH: CPT

## 2019-10-31 PROCEDURE — 82947 ASSAY GLUCOSE BLOOD QUANT: CPT

## 2019-10-31 PROCEDURE — 82435 ASSAY OF BLOOD CHLORIDE: CPT

## 2019-10-31 PROCEDURE — 99285 EMERGENCY DEPT VISIT HI MDM: CPT | Mod: 25

## 2019-10-31 PROCEDURE — 96375 TX/PRO/DX INJ NEW DRUG ADDON: CPT

## 2019-10-31 PROCEDURE — 87086 URINE CULTURE/COLONY COUNT: CPT

## 2019-10-31 PROCEDURE — 97161 PT EVAL LOW COMPLEX 20 MIN: CPT

## 2019-10-31 PROCEDURE — G0378: CPT

## 2019-10-31 PROCEDURE — 81025 URINE PREGNANCY TEST: CPT

## 2019-10-31 PROCEDURE — 80053 COMPREHEN METABOLIC PANEL: CPT

## 2019-10-31 PROCEDURE — 85014 HEMATOCRIT: CPT

## 2019-10-31 PROCEDURE — 85652 RBC SED RATE AUTOMATED: CPT

## 2019-10-31 PROCEDURE — 87040 BLOOD CULTURE FOR BACTERIA: CPT

## 2019-10-31 PROCEDURE — 81001 URINALYSIS AUTO W/SCOPE: CPT

## 2019-10-31 PROCEDURE — 84132 ASSAY OF SERUM POTASSIUM: CPT

## 2020-03-03 ENCOUNTER — TRANSCRIPTION ENCOUNTER (OUTPATIENT)
Age: 30
End: 2020-03-03

## 2020-04-13 ENCOUNTER — TRANSCRIPTION ENCOUNTER (OUTPATIENT)
Age: 30
End: 2020-04-13

## 2021-01-12 NOTE — ED PROVIDER NOTE - DATE/TIME 1
patient was found to be unresponsive at opthalmology clinic, hypotensive on arrival at clinic from ems 500 cc bolus given. Patient now back to baseline, bp hypertensive.  patient recently had change in BP medications.  04-Jun-2019 06:38

## 2021-05-07 ENCOUNTER — APPOINTMENT (OUTPATIENT)
Dept: NEUROLOGY | Facility: CLINIC | Age: 31
End: 2021-05-07
Payer: MEDICAID

## 2021-05-07 VITALS
WEIGHT: 155 LBS | TEMPERATURE: 97.9 F | SYSTOLIC BLOOD PRESSURE: 120 MMHG | DIASTOLIC BLOOD PRESSURE: 80 MMHG | HEIGHT: 59 IN | BODY MASS INDEX: 31.25 KG/M2

## 2021-05-07 DIAGNOSIS — Z86.39 PERSONAL HISTORY OF OTHER ENDOCRINE, NUTRITIONAL AND METABOLIC DISEASE: ICD-10-CM

## 2021-05-07 PROCEDURE — 99072 ADDL SUPL MATRL&STAF TM PHE: CPT

## 2021-05-07 PROCEDURE — 99204 OFFICE O/P NEW MOD 45 MIN: CPT

## 2021-05-07 RX ORDER — INSULIN LISPRO 100 U/ML
100 INJECTION, SOLUTION SUBCUTANEOUS
Refills: 0 | Status: ACTIVE | COMMUNITY

## 2021-05-10 ENCOUNTER — NON-APPOINTMENT (OUTPATIENT)
Age: 31
End: 2021-05-10

## 2021-05-11 ENCOUNTER — RX CHANGE (OUTPATIENT)
Age: 31
End: 2021-05-11

## 2021-05-14 ENCOUNTER — NON-APPOINTMENT (OUTPATIENT)
Age: 31
End: 2021-05-14

## 2021-05-28 NOTE — PATIENT PROFILE ADULT - NSASFUNCLEVELADLTOILET_GEN_A_NUR
Cryotherapy    What is it?    Use of a very cold liquid, such as liquid nitrogen, to freeze and destroy abnormal skin cells that need to be removed    What should I expect?    Tenderness and redness    A small blister that might grow and fill with dark purple blood. There may be crusting.    More than one treatment may be needed if the lesions do not go away.    How do I care for the treated area?    Gently wash the area with your hands when bathing.    Use a thin layer of Vaseline to help with healing. You may use a Band-Aid.     The area should heal within 7-10 days and may leave behind a pink or lighter color.     Do not use an antibiotic or Neosporin ointment.     You may take acetaminophen (Tylenol) for pain.     Call your Doctor if you have:    Severe pain    Signs of infection (warmth, redness, cloudy yellow drainage, and or a bad smell)    Questions or concerns    Who should I call with questions?       [facility]: [phone]       [facility]: [phone]       For urgent needs outside of business hours call the [facility] at [phone]        and ask for the dermatology resident on call    
0 = independent

## 2021-06-01 ENCOUNTER — APPOINTMENT (OUTPATIENT)
Dept: NEUROLOGY | Facility: CLINIC | Age: 31
End: 2021-06-01
Payer: MEDICAID

## 2021-06-01 VITALS
SYSTOLIC BLOOD PRESSURE: 120 MMHG | HEIGHT: 59 IN | TEMPERATURE: 97.3 F | WEIGHT: 155 LBS | DIASTOLIC BLOOD PRESSURE: 80 MMHG | BODY MASS INDEX: 31.25 KG/M2

## 2021-06-01 PROCEDURE — 99214 OFFICE O/P EST MOD 30 MIN: CPT

## 2021-06-01 NOTE — DATA REVIEWED
[de-identified] : MRI of the brain dated 11/23/20 reported negative\par MRI of the orbits reported negative\par \par MRI of the cervical spine dated 1/6/21 reported to show multilevel disc bulging.

## 2021-06-01 NOTE — PHYSICAL EXAM
[General Appearance - Alert] : alert [General Appearance - In No Acute Distress] : in no acute distress [General Appearance - Well-Appearing] : healthy appearing [Oriented To Time, Place, And Person] : oriented to person, place, and time [Impaired Insight] : insight and judgment were intact [Affect] : the affect was normal [Memory Recent] : recent memory was not impaired [Person] : oriented to person [Place] : oriented to place [Time] : oriented to time [Concentration Intact] : normal concentrating ability [Fluency] : fluency intact [Comprehension] : comprehension intact [Cranial Nerves Optic (II)] : visual acuity intact bilaterally,  visual fields full to confrontation, pupils equal round and reactive to light [Cranial Nerves Oculomotor (III)] : extraocular motion intact [Cranial Nerves Trigeminal (V)] : facial sensation intact symmetrically [Cranial Nerves Facial (VII)] : face symmetrical [Cranial Nerves Vestibulocochlear (VIII)] : hearing was intact bilaterally [Cranial Nerves Glossopharyngeal (IX)] : tongue and palate midline [Cranial Nerves Accessory (XI - Cranial And Spinal)] : head turning and shoulder shrug symmetric [Cranial Nerves Hypoglossal (XII)] : there was no tongue deviation with protrusion [Motor Tone] : muscle tone was normal in all four extremities [Motor Strength] : muscle strength was normal in all four extremities [No Muscle Atrophy] : normal bulk in all four extremities [Paresis Pronator Drift Right-Sided] : no pronator drift on the right [Paresis Pronator Drift Left-Sided] : no pronator drift on the left [Sensation Tactile Decrease] : light touch was intact [Sensation Pain / Temperature Decrease] : pain and temperature was intact [Romberg's Sign] : Romberg's sign was negtive [Abnormal Walk] : normal gait [Balance] : balance was intact [Past-pointing] : there was no past-pointing [Tremor] : no tremor present [Coordination - Dysmetria Impaired Finger-to-Nose Bilateral] : not present [Coordination - Dysmetria Impaired Heel-to-Shin Bilateral] : not present [2+] : Ankle jerk left 2+ [Plantar Reflex Right Only] : normal on the right [Plantar Reflex Left Only] : normal on the left [FreeTextEntry5] : Mild right eye ptosis [PERRL With Normal Accommodation] : pupils were equal in size, round, reactive to light, with normal accommodation [Extraocular Movements] : extraocular movements were intact [Optic Disc Abnormality] : the optic disc were normal in size and color [Full Visual Field] : full visual field

## 2021-06-01 NOTE — ASSESSMENT
[FreeTextEntry1] : picture suggests cluster headaches. She will continue verapamil 80 mg twice a day. Followup in 4 months and by phone prior to that if needed..

## 2021-06-01 NOTE — HISTORY OF PRESENT ILLNESS
[FreeTextEntry1] : I saw her initially 5/7/21 with the following history. She reports a type of headache going on for about the last 4 years. They will begin with redness of either eye. she then developed a bad headache along with photophobia. The headache consists of throbbing. There is no nausea, vomiting or visual disturbance. The headache will wax and wane typically not severe but can be severe at times. It will tend to last for about a week. There tends to be rhinorrhea and lacrimation. She gets these attacks every few months. She has seen a neurologist in the past. She has tried what sounds like a triiptan, but cannot recall the name. It did not help. Ubrelvy also has not helped. Imaging studies have been negative. She comes in today reporting that had had a headache now for the last 4 days.\par \par Medical history significant for insulin-dependent diabetes.\par \par she says she has had her eyes checked and nothing significant was found.\par \par My impression was that these were cluster headaches. I started her on verapamil 80 mg twice a day.She called on 5/10/21. Headache was still bad. I prescribed a Medrol Dosepak. Headache resolved after a couple of days on the steroids. She has remained headache free.

## 2021-06-01 NOTE — CONSULT LETTER
[Dear  ___] : Dear  [unfilled], [Consult Letter:] : I had the pleasure of evaluating your patient, [unfilled]. [Please see my note below.] : Please see my note below. [Consult Closing:] : Thank you very much for allowing me to participate in the care of this patient.  If you have any questions, please do not hesitate to contact me. [Sincerely,] : Sincerely, [FreeTextEntry3] : Anand Orta MD, PhD, DPN-N\par [facility]\par Neurology at [place]\par Chief, Division of Neurology\par  [facility]\par

## 2021-10-20 NOTE — ED ADULT NURSE NOTE - NSFALLRSKHARMRISK_ED_ALL_ED
68F s/p mech fall down stairs, found to have small sdh/sah, now presenting with vertigo. Angora-hallpike performed which showed right sided lateral nystagmus.  Impression:  68 year old woman s/p mechanical fall down the stairs, previously on aspirin, CTH w/ trace R frontal tSAH and L sub occip skull fx, repeat CTH w/ trace inc R frontal tsah, thin bifrontal sdh, and new thin temporal tsah. CTV hypoplastic L TS but open.  No other injuries.  Neurologically intact.  For the past few weeks she has been complaining of vertigo when she stands up.  She never gets this woods she is sitting, and after standing for a while it goes away.  Per report here has mild orthostatic hypotension with diastolic BP.  She also was concerned about possible multiple sclerosis based on family history.    Diagnosis:  traumatic SAH and SDH    Recommendations:  tSAH and tSDH per nsx  repeat orthostatics if able for confirmation  advised hydration, compression stockings, and care to avoid falls in future.  Can increase salt intake if ok with internist but defer on this given she only has one kidney.    if this does not help can consider pharmaceutical intervention for orthostatic hypotension but will hold off on this for now  she does not have Parkinsonism   she does not have any signs or symptoms of multiple sclerosis  provided reassurance    67 yo Female with traumatic mechanical fall with injuries significant for Right frontal SAH and left suboccipital calvarial fracture without any additional traumatic injuries.       Plan  - Tertiary exam in the AM  - F/U Neurosurgery recommendations: Repeat imaging and CT venogram   - Cervical collar was cleared at bedside without issue  - Will continue to follow with you       Plan discussed with surgical attending Dr. Brian Fletcher MD PGY2  ACS  [phone]  68F h/o renal ca s/p nephrectomy, depression/anxiety p/w HA/nausea after a mechanical fall and was found to have SDH and SAH c a sub occipital skull fracture. +orthostatic hypotension by diastolic criteria no

## 2022-01-14 ENCOUNTER — APPOINTMENT (OUTPATIENT)
Dept: NEUROLOGY | Facility: CLINIC | Age: 32
End: 2022-01-14

## 2022-02-02 ENCOUNTER — APPOINTMENT (OUTPATIENT)
Dept: NEUROLOGY | Facility: CLINIC | Age: 32
End: 2022-02-02
Payer: MEDICAID

## 2022-02-02 VITALS
SYSTOLIC BLOOD PRESSURE: 130 MMHG | WEIGHT: 146 LBS | DIASTOLIC BLOOD PRESSURE: 80 MMHG | BODY MASS INDEX: 29.43 KG/M2 | HEIGHT: 59 IN

## 2022-02-02 PROCEDURE — 99214 OFFICE O/P EST MOD 30 MIN: CPT

## 2022-02-02 RX ORDER — METHYLPREDNISOLONE 4 MG/1
4 TABLET ORAL
Qty: 1 | Refills: 0 | Status: ACTIVE | COMMUNITY
Start: 2021-05-10 | End: 1900-01-01

## 2022-03-17 ENCOUNTER — APPOINTMENT (OUTPATIENT)
Dept: NEUROLOGY | Facility: CLINIC | Age: 32
End: 2022-03-17
Payer: COMMERCIAL

## 2022-03-17 VITALS — HEIGHT: 59 IN | BODY MASS INDEX: 29.23 KG/M2 | WEIGHT: 145 LBS

## 2022-03-17 PROCEDURE — 99214 OFFICE O/P EST MOD 30 MIN: CPT

## 2022-03-17 NOTE — HISTORY OF PRESENT ILLNESS
[FreeTextEntry1] : I saw her initially 5/7/21 with the following history. She reports a type of headache going on for about the last 4 years. They will begin with redness of either eye. she then developed a bad headache along with photophobia. The headache consists of throbbing. There is no nausea, vomiting or visual disturbance. The headache will wax and wane typically not severe but can be severe at times. It will tend to last for about a week. There tends to be rhinorrhea and lacrimation. She gets these attacks every few months. She has seen a neurologist in the past. She has tried what sounds like a triiptan, but cannot recall the name. It did not help. Ubrelvy also has not helped. Imaging studies have been negative. She comes in today reporting that had had a headache now for the last 4 days.\par \par Medical history significant for insulin-dependent diabetes.\par \par she says she has had her eyes checked and nothing significant was found.\par \par My impression was that these were cluster headaches. I started her on verapamil 80 mg twice a day.She called on 5/10/21. Headache was still bad. I prescribed a Medrol Dosepak. Headache resolved after a couple of days on the steroids. She had remained headache free.\par \par She returned 2/2/2022 with the following history.  She stopped the verapamil on her on. She developed a bad headache about a week ago and has persisted. She made 2 emergency room visits and a negative head CAT scan. Thus far she has had no relief. Ubrelvy did not work.\par \par I restarted the verapamil 80 mg twice a day and gave her Medrol Dosepak.  Headache  resolved after a few days.

## 2022-03-17 NOTE — DATA REVIEWED
[de-identified] : MRI of the brain dated 11/23/20 reported negative\par MRI of the orbits reported negative\par \par MRI of the cervical spine dated 1/6/21 reported to show multilevel disc bulging.

## 2022-03-17 NOTE — PHYSICAL EXAM
[General Appearance - Alert] : alert [General Appearance - In No Acute Distress] : in no acute distress [General Appearance - Well-Appearing] : healthy appearing [Oriented To Time, Place, And Person] : oriented to person, place, and time [Impaired Insight] : insight and judgment were intact [Affect] : the affect was normal [Memory Recent] : recent memory was not impaired [Person] : oriented to person [Place] : oriented to place [Time] : oriented to time [Concentration Intact] : normal concentrating ability [Fluency] : fluency intact [Comprehension] : comprehension intact [Cranial Nerves Optic (II)] : visual acuity intact bilaterally,  visual fields full to confrontation, pupils equal round and reactive to light [Cranial Nerves Oculomotor (III)] : extraocular motion intact [Cranial Nerves Trigeminal (V)] : facial sensation intact symmetrically [Cranial Nerves Facial (VII)] : face symmetrical [Cranial Nerves Vestibulocochlear (VIII)] : hearing was intact bilaterally [Cranial Nerves Glossopharyngeal (IX)] : tongue and palate midline [Cranial Nerves Accessory (XI - Cranial And Spinal)] : head turning and shoulder shrug symmetric [Cranial Nerves Hypoglossal (XII)] : there was no tongue deviation with protrusion [Motor Strength] : muscle strength was normal in all four extremities [Motor Tone] : muscle tone was normal in all four extremities [No Muscle Atrophy] : normal bulk in all four extremities [Paresis Pronator Drift Right-Sided] : no pronator drift on the right [Paresis Pronator Drift Left-Sided] : no pronator drift on the left [Sensation Tactile Decrease] : light touch was intact [Sensation Pain / Temperature Decrease] : pain and temperature was intact [Romberg's Sign] : Romberg's sign was negtive [Abnormal Walk] : normal gait [Balance] : balance was intact [Past-pointing] : there was no past-pointing [Tremor] : no tremor present [Coordination - Dysmetria Impaired Finger-to-Nose Bilateral] : not present [Coordination - Dysmetria Impaired Heel-to-Shin Bilateral] : not present [2+] : Ankle jerk left 2+ [Plantar Reflex Right Only] : normal on the right [Plantar Reflex Left Only] : normal on the left [FreeTextEntry5] : Mild right eye ptosis [PERRL With Normal Accommodation] : pupils were equal in size, round, reactive to light, with normal accommodation [Extraocular Movements] : extraocular movements were intact [Optic Disc Abnormality] : the optic disc were normal in size and color [Full Visual Field] : full visual field

## 2022-03-17 NOTE — ASSESSMENT
[FreeTextEntry1] : picture suggests cluster headaches. She will continue verapamil 80 mg twice a day. \par \par  Followup in 6 months and by phone prior to that if needed..

## 2022-03-17 NOTE — CONSULT LETTER
[Dear  ___] : Dear  [unfilled], [Consult Letter:] : I had the pleasure of evaluating your patient, [unfilled]. [Please see my note below.] : Please see my note below. [Consult Closing:] : Thank you very much for allowing me to participate in the care of this patient.  If you have any questions, please do not hesitate to contact me. [Sincerely,] : Sincerely, [FreeTextEntry3] : Anand Orta MD, PhD, DPN-N\par [facility]\par Neurology at [place]\par Chief, Division of Neurology\par  [facility]\par

## 2022-05-05 ENCOUNTER — RX RENEWAL (OUTPATIENT)
Age: 32
End: 2022-05-05

## 2022-08-08 ENCOUNTER — NON-APPOINTMENT (OUTPATIENT)
Age: 32
End: 2022-08-08

## 2022-08-11 ENCOUNTER — APPOINTMENT (OUTPATIENT)
Dept: SURGERY | Facility: CLINIC | Age: 32
End: 2022-08-11

## 2022-08-11 VITALS
SYSTOLIC BLOOD PRESSURE: 128 MMHG | RESPIRATION RATE: 14 BRPM | BODY MASS INDEX: 28.05 KG/M2 | WEIGHT: 141 LBS | HEIGHT: 59.5 IN | HEART RATE: 112 BPM | DIASTOLIC BLOOD PRESSURE: 84 MMHG | TEMPERATURE: 97.3 F | OXYGEN SATURATION: 98 %

## 2022-08-11 DIAGNOSIS — L03.312 CELLULITIS OF BACK [ANY PART EXCEPT BUTTOCK]: ICD-10-CM

## 2022-08-11 PROCEDURE — 99204 OFFICE O/P NEW MOD 45 MIN: CPT

## 2022-08-11 RX ORDER — AMOXICILLIN AND CLAVULANATE POTASSIUM 875; 125 MG/1; MG/1
875-125 TABLET, COATED ORAL
Qty: 14 | Refills: 0 | Status: ACTIVE | COMMUNITY
Start: 2022-08-11 | End: 1900-01-01

## 2022-08-12 NOTE — HISTORY OF PRESENT ILLNESS
[de-identified] : Ms. SMITH is a 32 year old woman with back lesion, which has been present for 1 week, who presents for evaluation. Pt states that lesion began as a pimple, which she drained manually and then lesion became more painful. Seen at urgent care and was provided with keflex yesterday - pt report no improvement in past 24 hours. Denies fever/chills/nausea/emesis or changes in bowel or bladder habits. Tolerating diet. Normal bowel movements.

## 2022-08-12 NOTE — PHYSICAL EXAM
[JVD] : no jugular venous distention  [Alert] : alert [Oriented to Person] : oriented to person [Oriented to Place] : oriented to place [Oriented to Time] : oriented to time [Calm] : calm [de-identified] : No acute distress [de-identified] : No respiratory distress [de-identified] : Regular rate [de-identified] : soft, nontender. no rebound or guarding. [de-identified] : 5mm region of erythema with central sinus consistent with manually drained pustule, surrounding 4x3 region of induration, no palpable fluctuance [de-identified] : normal range of motion

## 2022-08-12 NOTE — ASSESSMENT
[FreeTextEntry1] : Ms. SMITH is a 32 year old woman with back lesion consistent with cellulitis. We discussed the options of attempted drainage (needle aspiration and subsequent incision and drainage if purulent fluid aspirated). She declines this intervention and would like to continue with antibiotic therapy at this time. We discussed expanding antibiotic coverage by switching antibiotic to augmentin. Patient is agreeable to this plan.

## 2022-08-18 ENCOUNTER — APPOINTMENT (OUTPATIENT)
Dept: SURGERY | Facility: CLINIC | Age: 32
End: 2022-08-18

## 2022-08-18 VITALS
OXYGEN SATURATION: 95 % | WEIGHT: 134 LBS | RESPIRATION RATE: 14 BRPM | HEART RATE: 109 BPM | HEIGHT: 59.5 IN | SYSTOLIC BLOOD PRESSURE: 118 MMHG | BODY MASS INDEX: 26.66 KG/M2 | TEMPERATURE: 97.3 F | DIASTOLIC BLOOD PRESSURE: 84 MMHG

## 2022-08-18 PROCEDURE — 99214 OFFICE O/P EST MOD 30 MIN: CPT

## 2022-08-18 RX ORDER — AMOXICILLIN AND CLAVULANATE POTASSIUM 875; 125 MG/1; MG/1
875-125 TABLET, COATED ORAL
Qty: 14 | Refills: 0 | Status: ACTIVE | COMMUNITY
Start: 2022-08-11 | End: 1900-01-01

## 2022-08-18 NOTE — PHYSICAL EXAM
[Alert] : alert [Oriented to Person] : oriented to person [Oriented to Place] : oriented to place [Oriented to Time] : oriented to time [Calm] : calm [JVD] : no jugular venous distention  [de-identified] : No acute distress [de-identified] : No respiratory distress [de-identified] : Regular rate [de-identified] : soft, nontender. no rebound or guarding. [de-identified] : normal range of motion [de-identified] : Region of induration decreased in size, no fluctuance, no drainage, no surrounding erythema

## 2022-08-18 NOTE — HISTORY OF PRESENT ILLNESS
[de-identified] : Ms. SMITH is a 32 year old woman with back lesion, seen in office 1 week ago, who presents for followup. Pt states that lesion has improved, pain has decreased, and size has decreased. Reports good compliance with augmentin since last visit. Denies drainage. Region of induration decreased in size. Denies redness.  Denies fever/chills/nausea/emesis or changes in bowel or bladder habits. Tolerating diet. Normal bowel movements.

## 2022-08-18 NOTE — ASSESSMENT
[FreeTextEntry1] : Ms. SMITH is a 32 year old woman with back lesion consistent with cellulitis, which is improving with antibiotic therapy.

## 2022-08-18 NOTE — PLAN
[FreeTextEntry1] : Rx for 7 additional days Abx provided - complete total of 14 days Augmentin\par Return to office in 2 weeks or sooner if needed

## 2022-08-24 RX ORDER — AMOXICILLIN AND CLAVULANATE POTASSIUM 875; 125 MG/1; MG/1
875-125 TABLET, COATED ORAL
Qty: 14 | Refills: 0 | Status: ACTIVE | COMMUNITY
Start: 2022-08-24 | End: 1900-01-01

## 2022-09-01 ENCOUNTER — APPOINTMENT (OUTPATIENT)
Dept: SURGERY | Facility: CLINIC | Age: 32
End: 2022-09-01

## 2022-09-23 ENCOUNTER — APPOINTMENT (OUTPATIENT)
Dept: SURGERY | Facility: CLINIC | Age: 32
End: 2022-09-23

## 2022-09-23 VITALS
DIASTOLIC BLOOD PRESSURE: 85 MMHG | SYSTOLIC BLOOD PRESSURE: 123 MMHG | RESPIRATION RATE: 16 BRPM | WEIGHT: 137 LBS | HEART RATE: 80 BPM | TEMPERATURE: 98.2 F | OXYGEN SATURATION: 100 % | HEIGHT: 59.5 IN | BODY MASS INDEX: 27.25 KG/M2

## 2022-09-23 PROCEDURE — 99214 OFFICE O/P EST MOD 30 MIN: CPT

## 2022-09-24 NOTE — HISTORY OF PRESENT ILLNESS
[de-identified] : Ms. SMITH is a 32 year old woman with back lesion, last seen in office 8/18/22, who presents for followup. Pt states that lesion has improved, pain has decreased, and size has decreased. Reports occasional pain with contact. Denies drainage. Region of induration decreased in size. Denies redness.  Denies fever/chills/nausea/emesis or changes in bowel or bladder habits. Tolerating diet. Normal bowel movements.

## 2022-09-24 NOTE — ASSESSMENT
[FreeTextEntry1] : Ms. SMITH is a 32 year old woman with back lesion consistent with cellulitis, which continues to improve, region of induration decreasing in size, no erythema

## 2022-09-24 NOTE — PHYSICAL EXAM
[JVD] : no jugular venous distention  [Alert] : alert [Oriented to Person] : oriented to person [Oriented to Place] : oriented to place [Oriented to Time] : oriented to time [Calm] : calm [de-identified] : No acute distress [de-identified] : No respiratory distress [de-identified] : Regular rate [de-identified] : soft, nontender. no rebound or guarding. [de-identified] : normal range of motion [de-identified] : Region of induration further decreased in size, no fluctuance, no drainage, no surrounding erythema

## 2022-10-20 ENCOUNTER — APPOINTMENT (OUTPATIENT)
Dept: SURGERY | Facility: CLINIC | Age: 32
End: 2022-10-20

## 2022-11-01 NOTE — H&P ADULT - HISTORY OF PRESENT ILLNESS
28 y/o F with PMHx DM (on insulin pump), seen in multiple EDs for persistent back pain, diagnosed with pyelonephritis and hemorraghic cystitis in past, dced from [place] ER for similar symptoms, had hematuria but normal renal ultrasound, p/w persistent lower back pain since her discharge. Pt continues to deny trauma or strenuous origin. Back pain has not changed in location, is still b/l and in the CVA/lower lumbar region. Denies f/c/n/v/d. Medical Necessity Clause: This procedure was medically necessary because the lesions that were treated were: Show Aperture Variable?: Yes Medical Necessity Information: It is in your best interest to select a reason for this procedure from the list below. All of these items fulfill various CMS LCD requirements except the new and changing color options. Spray Paint Text: The liquid nitrogen was applied to the skin utilizing a spray paint frosting technique. Add 52 Modifier (Optional): no Duration Of Freeze Thaw-Cycle (Seconds): 10-15 Detail Level: Detailed Consent: The patient's consent was obtained including but not limited to risks of crusting, scabbing, blistering, scarring, darker or lighter pigmentary change, recurrence, incomplete removal and infection. Number Of Freeze-Thaw Cycles: 1 freeze-thaw cycle Post-Care Instructions: I reviewed with the patient in detail post-care instructions. Patient is to wear sunprotection, and avoid picking at any of the treated lesions. Pt may apply Vaseline to crusted or scabbing areas.

## 2022-12-11 ENCOUNTER — NON-APPOINTMENT (OUTPATIENT)
Age: 32
End: 2022-12-11

## 2023-01-03 ENCOUNTER — APPOINTMENT (OUTPATIENT)
Dept: NEUROLOGY | Facility: CLINIC | Age: 33
End: 2023-01-03
Payer: SELF-PAY

## 2023-01-03 VITALS
SYSTOLIC BLOOD PRESSURE: 120 MMHG | BODY MASS INDEX: 26.21 KG/M2 | HEIGHT: 59 IN | WEIGHT: 130 LBS | DIASTOLIC BLOOD PRESSURE: 82 MMHG

## 2023-01-03 DIAGNOSIS — G44.009 CLUSTER HEADACHE SYNDROME, UNSPECIFIED, NOT INTRACTABLE: ICD-10-CM

## 2023-01-03 PROCEDURE — 99214 OFFICE O/P EST MOD 30 MIN: CPT

## 2023-01-03 RX ORDER — VERAPAMIL HYDROCHLORIDE 80 MG/1
80 TABLET ORAL
Qty: 60 | Refills: 6 | Status: ACTIVE | COMMUNITY
Start: 2021-05-07 | End: 1900-01-01

## 2023-01-03 NOTE — HISTORY OF PRESENT ILLNESS
[FreeTextEntry1] : I saw her initially 5/7/21 with the following history. She reports a type of headache going on for about the last 4 years. They will begin with redness of either eye. she then developed a bad headache along with photophobia. The headache consists of throbbing. There is no nausea, vomiting or visual disturbance. The headache will wax and wane typically not severe but can be severe at times. It will tend to last for about a week. There tends to be rhinorrhea and lacrimation. She gets these attacks every few months. She has seen a neurologist in the past. She has tried what sounds like a triiptan, but cannot recall the name. It did not help. Ubrelvy also has not helped. Imaging studies have been negative. She comes in today reporting that had had a headache now for the last 4 days.\par \par Medical history significant for insulin-dependent diabetes.\par \par she says she has had her eyes checked and nothing significant was found.\par \par My impression was that these were cluster headaches. I started her on verapamil 80 mg twice a day.She called on 5/10/21. Headache was still bad. I prescribed a Medrol Dosepak. Headache resolved after a couple of days on the steroids. She had remained headache free.\par \par She returned 2/2/2022 with the following history.  She stopped the verapamil on her on. She developed a bad headache about a week ago and has persisted. She made 2 emergency room visits and a negative head CAT scan. Thus far she has had no relief. Ubrelvy did not work.\par \par I restarted the verapamil 80 mg twice a day and gave her Medrol Dosepak.  Headache  resolved after a few days.\par The verapamil is working well for Headache prevention.  Says she has had only 1 flareup since I last saw her about 10 months ago.

## 2023-01-03 NOTE — DATA REVIEWED
[de-identified] : MRI of the brain dated 11/23/20 reported negative\par MRI of the orbits reported negative\par \par MRI of the cervical spine dated 1/6/21 reported to show multilevel disc bulging.

## 2023-01-03 NOTE — CONSULT LETTER
[Dear  ___] : Dear  [unfilled], [Consult Letter:] : I had the pleasure of evaluating your patient, [unfilled]. [Please see my note below.] : Please see my note below. [Consult Closing:] : Thank you very much for allowing me to participate in the care of this patient.  If you have any questions, please do not hesitate to contact me. [Sincerely,] : Sincerely, [FreeTextEntry3] : Anand Orta MD, PhD, DPN-N\par [facility]\par Neurology at [place]\par Chief, Division of Neurology\par  [facility]\par

## 2023-01-25 ENCOUNTER — RX RENEWAL (OUTPATIENT)
Age: 33
End: 2023-01-25

## 2023-01-25 RX ORDER — VERAPAMIL HYDROCHLORIDE 180 MG/1
180 TABLET ORAL
Qty: 30 | Refills: 2 | Status: ACTIVE | COMMUNITY
Start: 2021-06-03 | End: 1900-01-01

## 2023-07-05 ENCOUNTER — APPOINTMENT (OUTPATIENT)
Dept: NEUROLOGY | Facility: CLINIC | Age: 33
End: 2023-07-05

## 2023-08-17 NOTE — ED ADULT NURSE NOTE - GENITOURINARY WDL
Was notified by RN that patient threatening to leave. Patient was AAO x4 capacity to make decisions for himself. Nurse discussed risks of leaving with patient. Patient left AMA before this provider was able to further evaluate.     Electronically signed by Katlin Lentz DO on 8/17/2023 at 8:41 PM
Bladder non-tender and non-distended. Urine clear yellow.

## 2023-11-01 NOTE — ED ADULT NURSE NOTE - BREATH SOUNDS, MLM
Chief Complaint(s) and History of Present Illness(es)       Follow Up    In both eyes.  Charactertized as  blurred vision.  Severity is mild.  Since onset it is stable.  Associated symptoms include Negative for double vision, eye pain and headache. Additional comments: 1. 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  No eye pain or headaches.  Khari reports vision is still fuzzy with right eye closed. Mom and Khari concerned about the right eye drifting.  His PCP has noticed worsening of the right eye drifting from his previous visits with her.  No double vision.   MARIELLE Whitmore 11/1/2023 10:44 AM                      Clear

## 2023-12-08 NOTE — PROGRESS NOTE ADULT - PROBLEM SELECTOR PROBLEM 1
Dr Celina Quesada      Date /Time 11/17/2023       9:32 AM EST  Name Alexei Ngo             1961   Location  [facility]  MRN [id number]                Chief Complaint   Patient presents with    Knee Pain     Left Knee Refer Dr Rk Brock        History of Present Illness  Alexei Ngo is a 58 y.o. female who presents with  left knee pain, . Patient presents the office today for follow-up visit. We did order her an MRI at her last visit. MRI to help us determine how much cartilage loss she really has over the medial aspect of her knee. She continues to have pain on a daily basis. It is global pain. Again it is medial, lateral, and patellofemoral      Previous History: HPI: Lexa Mckeon is a 58 y.o. female here today for left knee pain. Patient reports that she has had knee pain for roughly 4 months duration. She locates her knee pain along the lateral aspect of her knee. Of note, patient has undergone 2 arthroscopic meniscectomy procedures. Her first surgery was performed in 2013 by a surgeon she cannot remember. Her second lateral meniscectomy was [facility]. She reports that after the procedure her knee pain improved but this only lasted for about a month before she began having pain on the lateral aspect of her knee. She complains of pain it wakes her from sleep and also states that her pain affects her daily. Since her last surgery in May 2023, she has had gel injections and physical therapy, neither of which have helped her pain. She is here today to discuss further options    Patient goes on to state that she did well for approximately a month after her last knee arthroscopy. After her knee arthroscopy she did have a cortisone injection and viscosupplementation injections without improvement. Overall she has had 4 cortisone injections over her lifetime.   Patient goes on to state that the majority of her symptoms are lateral.  She does also
Left-sided low back pain without sciatica, unspecified chronicity
Left-sided low back pain without sciatica, unspecified chronicity
Type 2 diabetes mellitus with hyperglycemia, with long-term current use of insulin
Type 2 diabetes mellitus with hyperglycemia, with long-term current use of insulin

## 2024-02-27 NOTE — ED ADULT NURSE NOTE - BREATH SOUNDS, MLM
Follows psychiatry, therapist at [facility] - referral recently placed for ongoing mgmt.    Clear

## 2024-03-02 NOTE — ED ADULT NURSE NOTE - NS ED NURSE RECORD ANOTHER VITAL SIGN
0 with LISINOPRIL-HYDROCHLOROTHIAZIDE 10-12.5 MG Oral Tab     Hypertension Medications Protocol Passed03/01/2024 09:05 PM   Protocol Details CMP or BMP in past 12 months    Last BP reading less than 140/90    In person appointment or virtual visit in the past 12 mos or appointment in next 3 mos    EGFRCR or GFRNAA > 50      Last office visit:  1/22/24  No future appointments.  Last filled:  5/27/23  #90 with 3 refills   Last labs:  1/22/24  GFR:  56  Last BP:  128/70         Yes

## 2024-04-04 ENCOUNTER — RX ONLY (RX ONLY)
Age: 34
End: 2024-04-04

## 2024-04-04 ENCOUNTER — OFFICE (OUTPATIENT)
Dept: URBAN - METROPOLITAN AREA CLINIC 35 | Facility: CLINIC | Age: 34
Setting detail: OPHTHALMOLOGY
End: 2024-04-04
Payer: COMMERCIAL

## 2024-04-04 DIAGNOSIS — H25.13: ICD-10-CM

## 2024-04-04 DIAGNOSIS — H52.13: ICD-10-CM

## 2024-04-04 DIAGNOSIS — E10.3293: ICD-10-CM

## 2024-04-04 PROBLEM — H02.31 BLEPHAROCHALASIS; RIGHT UPPER LID, LEFT UPPER LID: Status: ACTIVE | Noted: 2024-04-04

## 2024-04-04 PROBLEM — H11.153 PINGUECULA; BOTH EYES: Status: ACTIVE | Noted: 2024-04-04

## 2024-04-04 PROBLEM — H02.34 BLEPHAROCHALASIS; RIGHT UPPER LID, LEFT UPPER LID: Status: ACTIVE | Noted: 2024-04-04

## 2024-04-04 PROCEDURE — 92015 DETERMINE REFRACTIVE STATE: CPT | Performed by: OPHTHALMOLOGY

## 2024-04-04 PROCEDURE — 92250 FUNDUS PHOTOGRAPHY W/I&R: CPT | Performed by: OPHTHALMOLOGY

## 2024-04-04 PROCEDURE — 92004 COMPRE OPH EXAM NEW PT 1/>: CPT | Performed by: OPHTHALMOLOGY

## 2024-04-04 ASSESSMENT — LID POSITION - COMMENTS
OS_COMMENTS: BLEPHAROCHALASIS
OD_COMMENTS: BLEPHAROCHALASIS

## 2024-06-20 ENCOUNTER — EMERGENCY (EMERGENCY)
Facility: HOSPITAL | Age: 34
LOS: 1 days | Discharge: HOME | End: 2024-06-20
Attending: STUDENT IN AN ORGANIZED HEALTH CARE EDUCATION/TRAINING PROGRAM
Payer: COMMERCIAL

## 2024-06-20 VITALS
HEIGHT: 59 IN | HEART RATE: 93 BPM | SYSTOLIC BLOOD PRESSURE: 146 MMHG | OXYGEN SATURATION: 100 % | WEIGHT: 117.07 LBS | RESPIRATION RATE: 18 BRPM | TEMPERATURE: 99 F | DIASTOLIC BLOOD PRESSURE: 99 MMHG

## 2024-06-20 VITALS
HEART RATE: 82 BPM | DIASTOLIC BLOOD PRESSURE: 85 MMHG | OXYGEN SATURATION: 100 % | SYSTOLIC BLOOD PRESSURE: 132 MMHG | RESPIRATION RATE: 18 BRPM

## 2024-06-20 LAB
A1C WITH ESTIMATED AVERAGE GLUCOSE RESULT: 16.2 % — HIGH (ref 4–5.6)
ALBUMIN SERPL ELPH-MCNC: 3.9 G/DL — SIGNIFICANT CHANGE UP (ref 3.3–5.2)
ALP SERPL-CCNC: 88 U/L — SIGNIFICANT CHANGE UP (ref 40–120)
ALT FLD-CCNC: 32 U/L — SIGNIFICANT CHANGE UP
ANION GAP SERPL CALC-SCNC: 12 MMOL/L — SIGNIFICANT CHANGE UP (ref 5–17)
AST SERPL-CCNC: 21 U/L — SIGNIFICANT CHANGE UP
BASE EXCESS BLDV CALC-SCNC: 5.9 MMOL/L — HIGH (ref -2–3)
BASOPHILS # BLD AUTO: 0.05 K/UL — SIGNIFICANT CHANGE UP (ref 0–0.2)
BASOPHILS NFR BLD AUTO: 0.5 % — SIGNIFICANT CHANGE UP (ref 0–2)
BILIRUB SERPL-MCNC: 0.4 MG/DL — SIGNIFICANT CHANGE UP (ref 0.4–2)
BUN SERPL-MCNC: 9.6 MG/DL — SIGNIFICANT CHANGE UP (ref 8–20)
CA-I SERPL-SCNC: 1.22 MMOL/L — SIGNIFICANT CHANGE UP (ref 1.15–1.33)
CALCIUM SERPL-MCNC: 9.2 MG/DL — SIGNIFICANT CHANGE UP (ref 8.4–10.5)
CHLORIDE BLDV-SCNC: 98 MMOL/L — SIGNIFICANT CHANGE UP (ref 96–108)
CHLORIDE SERPL-SCNC: 94 MMOL/L — LOW (ref 96–108)
CO2 SERPL-SCNC: 27 MMOL/L — SIGNIFICANT CHANGE UP (ref 22–29)
CREAT SERPL-MCNC: 0.51 MG/DL — SIGNIFICANT CHANGE UP (ref 0.5–1.3)
EGFR: 126 ML/MIN/1.73M2 — SIGNIFICANT CHANGE UP
EOSINOPHIL # BLD AUTO: 0.06 K/UL — SIGNIFICANT CHANGE UP (ref 0–0.5)
EOSINOPHIL NFR BLD AUTO: 0.5 % — SIGNIFICANT CHANGE UP (ref 0–6)
ESTIMATED AVERAGE GLUCOSE: 418 MG/DL — HIGH (ref 68–114)
GAS PNL BLDV: 131 MMOL/L — LOW (ref 136–145)
GAS PNL BLDV: SIGNIFICANT CHANGE UP
GLUCOSE BLDV-MCNC: 449 MG/DL — HIGH (ref 70–99)
GLUCOSE SERPL-MCNC: 391 MG/DL — HIGH (ref 70–99)
HCO3 BLDV-SCNC: 31 MMOL/L — HIGH (ref 22–29)
HCT VFR BLD CALC: 40 % — SIGNIFICANT CHANGE UP (ref 34.5–45)
HCT VFR BLDA CALC: 42 % — SIGNIFICANT CHANGE UP
HGB BLD CALC-MCNC: 14 G/DL — SIGNIFICANT CHANGE UP (ref 11.7–16.1)
HGB BLD-MCNC: 13.5 G/DL — SIGNIFICANT CHANGE UP (ref 11.5–15.5)
IMM GRANULOCYTES NFR BLD AUTO: 0.3 % — SIGNIFICANT CHANGE UP (ref 0–0.9)
LACTATE BLDV-MCNC: 1.2 MMOL/L — SIGNIFICANT CHANGE UP (ref 0.5–2)
LIDOCAIN IGE QN: 39 U/L — SIGNIFICANT CHANGE UP (ref 22–51)
LYMPHOCYTES # BLD AUTO: 2.48 K/UL — SIGNIFICANT CHANGE UP (ref 1–3.3)
LYMPHOCYTES # BLD AUTO: 22.5 % — SIGNIFICANT CHANGE UP (ref 13–44)
MCHC RBC-ENTMCNC: 28.6 PG — SIGNIFICANT CHANGE UP (ref 27–34)
MCHC RBC-ENTMCNC: 33.8 GM/DL — SIGNIFICANT CHANGE UP (ref 32–36)
MCV RBC AUTO: 84.7 FL — SIGNIFICANT CHANGE UP (ref 80–100)
MONOCYTES # BLD AUTO: 0.46 K/UL — SIGNIFICANT CHANGE UP (ref 0–0.9)
MONOCYTES NFR BLD AUTO: 4.2 % — SIGNIFICANT CHANGE UP (ref 2–14)
NEUTROPHILS # BLD AUTO: 7.96 K/UL — HIGH (ref 1.8–7.4)
NEUTROPHILS NFR BLD AUTO: 72 % — SIGNIFICANT CHANGE UP (ref 43–77)
PCO2 BLDV: 54 MMHG — HIGH (ref 39–42)
PH BLDV: 7.37 — SIGNIFICANT CHANGE UP (ref 7.32–7.43)
PLATELET # BLD AUTO: 242 K/UL — SIGNIFICANT CHANGE UP (ref 150–400)
PO2 BLDV: 60 MMHG — HIGH (ref 25–45)
POTASSIUM BLDV-SCNC: 4.7 MMOL/L — SIGNIFICANT CHANGE UP (ref 3.5–5.1)
POTASSIUM SERPL-MCNC: 4.7 MMOL/L — SIGNIFICANT CHANGE UP (ref 3.5–5.3)
POTASSIUM SERPL-SCNC: 4.7 MMOL/L — SIGNIFICANT CHANGE UP (ref 3.5–5.3)
PROT SERPL-MCNC: 7.2 G/DL — SIGNIFICANT CHANGE UP (ref 6.6–8.7)
RBC # BLD: 4.72 M/UL — SIGNIFICANT CHANGE UP (ref 3.8–5.2)
RBC # FLD: 10.9 % — SIGNIFICANT CHANGE UP (ref 10.3–14.5)
SAO2 % BLDV: 91.6 % — SIGNIFICANT CHANGE UP
SODIUM SERPL-SCNC: 133 MMOL/L — LOW (ref 135–145)
WBC # BLD: 11.04 K/UL — HIGH (ref 3.8–10.5)
WBC # FLD AUTO: 11.04 K/UL — HIGH (ref 3.8–10.5)

## 2024-06-20 PROCEDURE — 85014 HEMATOCRIT: CPT

## 2024-06-20 PROCEDURE — 84295 ASSAY OF SERUM SODIUM: CPT

## 2024-06-20 PROCEDURE — 83036 HEMOGLOBIN GLYCOSYLATED A1C: CPT

## 2024-06-20 PROCEDURE — 84132 ASSAY OF SERUM POTASSIUM: CPT

## 2024-06-20 PROCEDURE — 82947 ASSAY GLUCOSE BLOOD QUANT: CPT

## 2024-06-20 PROCEDURE — 84702 CHORIONIC GONADOTROPIN TEST: CPT

## 2024-06-20 PROCEDURE — 82962 GLUCOSE BLOOD TEST: CPT

## 2024-06-20 PROCEDURE — 99284 EMERGENCY DEPT VISIT MOD MDM: CPT

## 2024-06-20 PROCEDURE — 83605 ASSAY OF LACTIC ACID: CPT

## 2024-06-20 PROCEDURE — 85025 COMPLETE CBC W/AUTO DIFF WBC: CPT

## 2024-06-20 PROCEDURE — 96374 THER/PROPH/DIAG INJ IV PUSH: CPT

## 2024-06-20 PROCEDURE — 82803 BLOOD GASES ANY COMBINATION: CPT

## 2024-06-20 PROCEDURE — 82330 ASSAY OF CALCIUM: CPT

## 2024-06-20 PROCEDURE — 83690 ASSAY OF LIPASE: CPT

## 2024-06-20 PROCEDURE — 99284 EMERGENCY DEPT VISIT MOD MDM: CPT | Mod: 25

## 2024-06-20 PROCEDURE — 96375 TX/PRO/DX INJ NEW DRUG ADDON: CPT

## 2024-06-20 PROCEDURE — 80053 COMPREHEN METABOLIC PANEL: CPT

## 2024-06-20 PROCEDURE — 82435 ASSAY OF BLOOD CHLORIDE: CPT

## 2024-06-20 PROCEDURE — 36415 COLL VENOUS BLD VENIPUNCTURE: CPT

## 2024-06-20 PROCEDURE — 85018 HEMOGLOBIN: CPT

## 2024-06-20 RX ORDER — METOCLOPRAMIDE HCL 10 MG
10 TABLET ORAL ONCE
Refills: 0 | Status: COMPLETED | OUTPATIENT
Start: 2024-06-20 | End: 2024-06-20

## 2024-06-20 RX ORDER — SODIUM CHLORIDE 9 MG/ML
1000 INJECTION INTRAMUSCULAR; INTRAVENOUS; SUBCUTANEOUS ONCE
Refills: 0 | Status: COMPLETED | OUTPATIENT
Start: 2024-06-20 | End: 2024-06-20

## 2024-06-20 RX ORDER — KETOROLAC TROMETHAMINE 30 MG/ML
15 SYRINGE (ML) INJECTION ONCE
Refills: 0 | Status: DISCONTINUED | OUTPATIENT
Start: 2024-06-20 | End: 2024-06-20

## 2024-06-20 RX ADMIN — SODIUM CHLORIDE 1000 MILLILITER(S): 9 INJECTION INTRAMUSCULAR; INTRAVENOUS; SUBCUTANEOUS at 09:48

## 2024-06-20 RX ADMIN — Medication 10 MILLIGRAM(S): at 09:47

## 2024-06-20 RX ADMIN — Medication 15 MILLIGRAM(S): at 09:47

## 2024-06-20 RX ADMIN — Medication 15 MILLIGRAM(S): at 10:48

## 2024-06-20 NOTE — ED PROVIDER NOTE - PATIENT PORTAL LINK FT
You can access the FollowMyHealth Patient Portal offered by [facility] by registering at the following website: [email or web address]. By joining [facility]’s FollowMyHealth portal, you will also be able to view your health information using other applications (apps) compatible with our system.

## 2024-06-20 NOTE — ED PROVIDER NOTE - OBJECTIVE STATEMENT
Patient is a 35yo F with PMHx of ocular migraines and IDDM who presents to the ED complaining of headache since last night. Patient states it feels like her regular headaches, above the R eye, associated with eye redness, tearing, photophobia, vision changes, nausea, sometimes rhinorrhea. Has a neurologist (Dr. Orta) who prescribed her Verapamil but patient takes it as needed and not every day; took it last night with no improvement.     Patient also hyperglycemic, non compliant with insulin because she says she doesn't like injecting herself. Has an endocrinologist appointment (Cinthya Vergara) July 17 to try to get back on her insulin pump.     Patient also complains of chronic diarrhea for the past 3 years with associated abdominal cramping, occasional loss of bowel control. Has not seen GI recently.

## 2024-06-20 NOTE — ED ADULT NURSE NOTE - NSFALLUNIVINTERV_ED_ALL_ED
Bed/Stretcher in lowest position, wheels locked, appropriate side rails in place/Call bell, personal items and telephone in reach/Instruct patient to call for assistance before getting out of bed/chair/stretcher/Non-slip footwear applied when patient is off stretcher/[place] to call system/Physically safe environment - no spills, clutter or unnecessary equipment/Purposeful proactive rounding/Room/bathroom lighting operational, light cord in reach

## 2024-06-20 NOTE — ED ADULT NURSE NOTE - PRO INTERPRETER NEED 2
SLUMS improved from prior appointment from 20 to 26  Negative GDS  Reviewed labs and imaging. 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

## 2024-06-20 NOTE — ED ADULT NURSE NOTE - BREATHING, MLM
Resolved.  At present normoglycemic. Monitor Accu-Cheks as per protocol. S/P IVF  Feeding EHM 35-45ml PO q3h with improving feeding pattern  Since off of fortifier, will switch to polyvisol Spontaneous, unlabored and symmetrical

## 2024-06-20 NOTE — ED ADULT TRIAGE NOTE - CHIEF COMPLAINT QUOTE
pt w/ migraine since last night w/ hx of migraine. hyperglycemic. hx of dm with insulin non adherence. took prescribed meds without relief.

## 2024-06-20 NOTE — ED ADULT TRIAGE NOTE - HEIGHT IN CM
Subjective:   Buster Medina is a 71 y.o. male who presents today with   Chief Complaint   Patient presents with    Eye Problem     Bilateral eyes, itchy, redness, was worse 5 days ago, no known injury, drain in the morning, burning sensation      Eye Problem   Both eyes are affected. This is a new problem. Episode onset: 5 days. The problem occurs constantly. The problem has been gradually improving. There was no injury mechanism. Associated symptoms include blurred vision (with discharge), an eye discharge and eye redness. Pertinent negatives include no double vision or photophobia.   Patient states he has noticed some crusty discharge from the eyes over the last few days.    PMH:  has a past medical history of Anxiety, Arthritis, Bowel habit changes (06/24/2021), Cancer ([facility]) (2007), Chickenpox, Depression, High cholesterol, Hyperlipidemia, Hypertension, Kidney stone, Mumps, Obesity, Pneumothorax, Psoriatic arthritis ([facility]), Restless leg syndrome, Sleep apnea, and Tonsillitis.  MEDS:   Current Outpatient Medications:     LORazepam (ATIVAN) 2 MG tablet, , Disp: , Rfl:     polymixin-trimethoprim (POLYTRIM) 50667-1.1 UNIT/ML-% Solution, Administer 1 Drop into both eyes every 4 hours for 7 days., Disp: 10 mL, Rfl: 0    celecoxib (CELEBREX) 200 MG Cap, Take 1 Capsule by mouth every day., Disp: 90 Capsule, Rfl: 0    atorvastatin (LIPITOR) 40 MG Tab, Take 1 Tablet by mouth every day., Disp: 90 Tablet, Rfl: 3    mirtazapine (REMERON) 15 MG Tab, TAKE 1 TABLET AT BEDTIME, Disp: 90 Tablet, Rfl: 3    lisinopril (PRINIVIL) 10 MG Tab, TAKE 1 TABLET BY MOUTH EVERY DAY. STOP 20 MG DOSE, Disp: 90 Tablet, Rfl: 0    BELSOMRA 15 MG Tab, Take 15 mg by mouth at bedtime as needed., Disp: , Rfl:     ferrous sulfate 325 (65 Fe) MG tablet, Take 325 mg by mouth every day., Disp: , Rfl:     B Complex Vitamins (VITAMIN B COMPLEX PO), Take 1 Tablet by mouth every day., Disp: , Rfl:     Cholecalciferol (VITAMIN D3 PO), Take 1 Tablet by  149.86 mouth every 7 days. OTC, Disp: , Rfl:     omeprazole (PRILOSEC) 20 MG delayed-release capsule, Take 20 mg by mouth every evening., Disp: , Rfl:     aspirin EC (ECOTRIN) 81 MG Tablet Delayed Response, Take 81 mg by mouth every bedtime., Disp: 30 Tab, Rfl: 0    Multiple Vitamins-Minerals (MELODY-DAY 1000 PO), Take 1 Tab by mouth 2 Times a Day., Disp: , Rfl:     ketoconazole (NIZORAL) 2 % shampoo, CLEANSE BODY 3-4 TIMES A WEEK, Disp: , Rfl:     triamcinolone acetonide (KENALOG) 0.1 % Cream, PLEASE SEE ATTACHED FOR DETAILED DIRECTIONS, Disp: , Rfl:     Suvorexant (BELSOMRA) 15 MG Tab, Take 1 Tablet by mouth at bedtime as needed (insomnia) for up to 90 days., Disp: 90 Tablet, Rfl: 0    ciprofloxacin (CIPRO) 500 MG Tab, Take 1 Tablet by mouth 2 times a day. X 3 day prn diarrhea, Disp: 6 Tablet, Rfl: 0    Nirmatrelvir&Ritonavir 300/100 20 x 150 MG & 10 x 100MG Tablet Therapy Pack, Take 300 mg nirmatrelvir (two 150 mg tablets) with 100 mg ritonavir (one 100 mg tablet) by mouth, with all three tablets taken together twice daily for 5 days. (Patient not taking: Reported on 8/25/2022), Disp: 30 Each, Rfl: 0    HUMIRA PEN 40 MG/0.4ML Pen-injector Kit, INJECT 1 PEN UNDER THE SKIN EVERY 14 DAYS. (Patient taking differently: Inject 40 mg under the skin every 14 days.), Disp: 6 Each, Rfl: 0  ALLERGIES: No Known Allergies  SURGHX:   Past Surgical History:   Procedure Laterality Date    PB INCISE FINGER TENDON SHEATH Left 8/30/2022    Procedure: LEFT MIDDLE FINGER TRIGGER RELEASE;  Surgeon: Td Soliman M.D.;  Location: [facility];  Service: Orthopedics    LA CYSTOURETHROSCOPY,BIOPSIES N/A 8/17/2022    Procedure: CYSTOSCOPY;  Surgeon: Jerzy Jasso M.D.;  Location: SURGERY [facility];  Service: Urology    LA CYSTOURETHROSCOPY,BIOPSIES  7/6/2021    Procedure: BIOPSY, BLADDER WITH FULGERATION;  Surgeon: Bob Millan M.D.;  Location: SURGERY [facility];  Service: Urology    BLADDER BIOPSY WITH CYSTOSCOPY   "1/3/2018    Procedure: BLADDER BIOPSY WITH CYSTOSCOPY/WITH UPPER TRACT WASHING;  Surgeon: El Manuel M.D.;  Location: SURGERY [facility];  Service: Urology    RETROGRADES Bilateral 1/3/2018    Procedure: RETROGRADES;  Surgeon: El Manuel M.D.;  Location: SURGERY [facility];  Service: Urology    PYELOGRAM Bilateral 1/3/2018    Procedure: PYELOGRAM;  Surgeon: El Manuel M.D.;  Location: SURGERY [facility];  Service: Urology    OTHER ORTHOPEDIC SURGERY  2015    shoulder replacement    ABDOMINAL EXPLORATION      ARTHROPLASTY      right shoulder    BLADDER BIOPSY WITH CYSTOSCOPY      EYE SURGERY      lasik     GASTRIC BYPASS LAPAROSCOPIC      HERNIA REPAIR      OPEN REDUCTION      OTHER Left     thumb joint    THORACOTOMY      TURP-VAPOR       SOCHX:  reports that he has never smoked. He has never used smokeless tobacco. He reports that he does not currently use alcohol after a past usage of about 8.4 oz per week. He reports that he does not currently use drugs after having used the following drugs: Marijuana and Oral.  FH: Reviewed with patient, not pertinent to this visit.     Review of Systems   Eyes:  Positive for blurred vision (with discharge), discharge and redness. Negative for double vision, photophobia and pain.        Itchiness      Objective:   /76 (BP Location: Left arm, Patient Position: Sitting, BP Cuff Size: Adult)   Pulse (!) 54   Temp 36.3 °C (97.3 °F) (Temporal)   Resp 20   Ht 1.702 m (5' 7\")   Wt 81.6 kg (180 lb)   SpO2 98%   BMI 28.19 kg/m²   Physical Exam  Vitals and nursing note reviewed.   Constitutional:       General: He is not in acute distress.     Appearance: Normal appearance. He is well-developed. He is not ill-appearing or toxic-appearing.   HENT:      Head: Normocephalic and atraumatic.      Right Ear: Hearing normal.      Left Ear: Hearing normal.   Eyes:      General: Lids are normal. Lids are everted, no foreign bodies appreciated. Vision grossly " intact.         Right eye: No foreign body or discharge.         Left eye: No foreign body or discharge.      Extraocular Movements: Extraocular movements intact.      Conjunctiva/sclera:      Right eye: Right conjunctiva is injected.      Left eye: Left conjunctiva is injected.      Pupils: Pupils are equal, round, and reactive to light.      Comments: No uptake noted with fluorescein stain on Woods lamp exam.  No corneal haze.   Cardiovascular:      Rate and Rhythm: Normal rate.   Pulmonary:      Effort: Pulmonary effort is normal.   Musculoskeletal:      Comments: Normal movement in all 4 extremities   Skin:     General: Skin is warm and dry.   Neurological:      Mental Status: He is alert.      Coordination: Coordination normal.   Psychiatric:         Mood and Affect: Mood normal.     Assessment/Plan:   Assessment    1. Acute conjunctivitis of both eyes, unspecified acute conjunctivitis type  - polymixin-trimethoprim (POLYTRIM) 80855-7.1 UNIT/ML-% Solution; Administer 1 Drop into both eyes every 4 hours for 7 days.  Dispense: 10 mL; Refill: 0    Other orders  - ketoconazole (NIZORAL) 2 % shampoo; CLEANSE BODY 3-4 TIMES A WEEK  - LORazepam (ATIVAN) 2 MG tablet  - triamcinolone acetonide (KENALOG) 0.1 % Cream; PLEASE SEE ATTACHED FOR DETAILED DIRECTIONS  Symptoms and presentation are consistent with conjunctivitis at this time with improving injection especially to the right eye.  Recommend monitoring and with any change in symptoms or any worsening would recommend following up with ophthalmologist such as [facility] eye care as we discussed and he is understanding.  Will cover for bacterial infection given that he has been having drainage and discharge from his eyes.    Differential diagnosis, natural history, supportive care, and indications for immediate follow-up discussed.   Patient given instructions and understanding of medications and treatment.    If not improving in 3-5 days, F/U with PCP or return to  [facility] if symptoms worsen.    Patient agreeable to plan.    Please note that this dictation was created using voice recognition software. I have made every reasonable attempt to correct obvious errors, but I expect that there are errors of grammar and possibly content that I did not discover before finalizing the note.    Bunny Gonzalez PA-C

## 2024-06-20 NOTE — ED PROVIDER NOTE - PROGRESS NOTE DETAILS
MD Sean: Labs wnl, headache resolved, A1c >16, patient has endo follow up, debbi dc. Emphasized importance of insulin adherence.

## 2024-06-20 NOTE — ED ADULT NURSE NOTE - NSSEPSISSUSPECTED_ED_A_ED
Occupational Therapy: Pt sitting up in chair, states she just finished bathing at sinkside followed by toileting and now needs to rest. Will defer OT for now and reattempt as time allows and pt is available.    No

## 2024-06-20 NOTE — ED ADULT NURSE NOTE - DOES PATIENT HAVE ADVANCE DIRECTIVE
Appendicitis    GERD (gastroesophageal reflux disease)    Metastatic renal cell carcinoma  to brain and lung s/p gammaknife, 3 cycles nipo/ipi unknown

## 2024-06-20 NOTE — ED PROVIDER NOTE - PHYSICAL EXAMINATION
Gen: AAOx3, NAD, well nourished  HEENT: Normocephalic atraumatic. EOMI. PERRLA. No scleral icterus. Moist mucus membranes. + R conjunctival erythema.  CV: RRR. Audible S1 and S2. No murmurs. 2+ radial and PT pulses   Pulm: Clear to auscultation bilaterally. No wheezes, rales, or rhonchi. No accessory muscle use or respiratory distress.  Abdomen: soft, normoactive BS, non distended, nontender, no rebound, no guarding  Musculoskeletal:  Moving all extremities equally. No gross deformity. No tenderness to palpation.  Skin: No rashes or lesions. Warm.  Neurologic: No focal neurological deficits. CN II-XII grossly intact.  Psych: Appropriate mood and affect. Cooperative.

## 2024-06-20 NOTE — ED PROVIDER NOTE - PRINCIPAL DIAGNOSIS
piv established, 2nd set of blood cultures collected and sent. Pt medicated per mar.    Migraine headache

## 2024-06-20 NOTE — ED PROVIDER NOTE - CLINICAL SUMMARY MEDICAL DECISION MAKING FREE TEXT BOX
Patient is a 35yo F with PMHx of ocular migraines and IDDM who presents to the ED complaining of headache since last night. Patient with diagnosis of ocular migraines, but clinically more consistent with cluster headaches. Will give migraine cocktail and trial oxygen. Will get basic labs and VBG to r/o diabetic emergency given hyperglycemia and insulin non compliance.

## 2024-06-20 NOTE — ED PROVIDER NOTE - ATTENDING CONTRIBUTION TO CARE
34-year-old female with a past medical history of ocular migraines of diabetes presents with headache.  Patient states her headaches feel like her prior headaches above the right eye associated with tearing photophobia eye redness  patient found to be hyperglycemic years states she does not take her insulin.  Patient with no acute diabetic emergency here improved after medications neuro intact throughout.  Patient found to have elevated hemoglobin A1c instructed on the importance of taking her medications and following up with her endocrinologist  A&Ox3, moving all extremities symmetrically, follows commands, motor edda upper and lower ext 5/5, sensory symm and intact CN 2-12 grossly intact, no ataxia, no nystagmus, no dysmetria, no ddk, symm edda, no pronator drift

## 2024-10-15 NOTE — ED ADULT NURSE NOTE - NS PRO PASSIVE SMOKE EXP
Infectious Disease Progress Note    Date of service: 10/15/2024       Subjective   Afebrile, anemic, worsening blood pressure so transferred back to ICU.    Medications:  Current Facility-Administered Medications   Medication Dose Route Frequency Provider Last Rate Last Admin    NORepinephrine (LEVOPHED) 8 mg/250 mL in dextrose 5 % infusion  0-30 mcg/min Intravenous Continuous PRN Derek Claros CNP 26.25 mL/hr at 10/15/24 0914 14 mcg/min at 10/15/24 0914    sodium chloride 0.9% infusion   Intravenous Continuous PRN Derek Claros CNP        famotidine (PEPCID) tablet 20 mg  20 mg Per PEG Tube Daily Varinder Garsia MD   20 mg at 10/15/24 0857    acetaminophen (TYLENOL) tablet 650 mg  650 mg Per PEG Tube Q4H PRN Varinder Garsia MD        midodrine (PROAMATINE) tablet 20 mg  20 mg Per PEG Tube 3 times per day Varinder Garsia MD   20 mg at 10/15/24 0523    voriconazole (VFEND) tablet 200 mg  200 mg Per PEG Tube 2 times per day Varinder Garsia MD   200 mg at 10/15/24 0909    docusate sodium-sennosides (SENOKOT S) 50-8.6 MG 2 tablet  2 tablet Per PEG Tube BID PRN Varinder Garsia MD        polyethylene glycol (MIRALAX) packet 17 g  17 g Per PEG Tube Daily PRN Varinder Garisa MD        Or    bisacodyl (DULCOLAX) suppository 10 mg  10 mg Rectal Daily PRN Varinder Garsia MD        [START ON 10/16/2024] aspirin chewable 81 mg  81 mg Per PEG Tube Every Other Day Varinder Garsia MD        aluminum-magnesium hydroxide-simethicone (MAALOX) 200-200-20 MG/5ML suspension 30 mL  30 mL Per PEG Tube Q4H PRN Varinder Garsia MD        magnesium hydroxide (MILK OF MAGNESIA) 400 MG/5ML suspension 30 mL  30 mL Per PEG Tube Daily PRN Varinder Garsia MD        vancomycin (FIRVANQ) 250 MG/5ML solution 125 mg  125 mg Per PEG Tube 4x Daily Varinder Garsia MD   125 mg at 10/15/24 0857    [Held by provider] insulin glargine (LANTUS) injection 10 Units  10 Units Subcutaneous Daily Varinder Garsia MD   10 Units at 10/11/24 1319    heparin (porcine) injection 5,000  Units  5,000 Units Subcutaneous 3 times per day Varinder Garsia MD   5,000 Units at 10/15/24 0602    sodium citrate anticoagulant 4 % flush 3 mL  3 mL Intracatheter PRN Simon Wilson MD        alteplase (CATHFLO ACTIVASE) injection 2 mg  2 mg Intracatheter PRN Simon Wilson MD        sodium chloride (NORMAL SALINE) 0.9 % bolus 100-200 mL  100-200 mL Intravenous PRN Simon Wilson MD        insulin lispro (ADMELOG,HumaLOG) - Correction Dose   Subcutaneous 4 times per day Varinder Garsia MD   3 Units at 10/15/24 0602    ipratropium-albuterol (DUONEB) 0.5-2.5 (3) MG/3ML nebulizer solution 3 mL  3 mL Nebulization Q6H Resp PRN Lexus Bolivar MD        dextrose 50 % injection 25 g  25 g Intravenous PRN Wu Davila DO        dextrose 50 % injection 12.5 g  12.5 g Intravenous PRN Wu Davila DO   12.5 g at 10/07/24 1208    glucagon (GLUCAGEN) injection 1 mg  1 mg Intramuscular PRN Wu Davila, DO        chlorhexidine gluconate (PERIDEX) 0.12 % solution 15 mL  15 mL Swish & Spit 2 times per day Adilene Rogers DO   15 mL at 10/15/24 0857    CARBOXYMethylcellulose (REFRESH TEARS) 0.5 % ophthalmic solution 1 drop  1 drop Both Eyes 6 times per day Wu Davila DO   1 drop at 10/15/24 0857    CARBOXYMethylcellulose (REFRESH TEARS) 0.5 % ophthalmic solution 1 drop  1 drop Both Eyes PRN Wu Davila, DO   1 drop at 24 1229    sodium chloride 0.9 % injection 2 mL  2 mL Intracatheter 2 times per day Jordana Raman MD   2 mL at 10/15/24 0858    ondansetron (ZOFRAN) injection 4 mg  4 mg Intravenous BID PRN Tanisha Daniel MD   4 mg at 24 1908    sodium chloride 0.9 % injection 10 mL  10 mL Injection 2 times per day Tanisha Daniel MD   10 mL at 10/15/24 0858          Review of Systems:  Review of systems unable to be obtained due to tracheostomy      Objective     Physical exam:  Temp (24hrs), Av.3 °F (36.8 °C), Min:97.3 °F (36.3 °C), Max:99.1 °F (37.3  °C)  Visit Vitals  BP 98/69   Pulse (!) 116   Temp 98.1 °F (36.7 °C) (Axillary)   Resp (!) 28   Ht 5' 4\" (1.626 m)   Wt 98 kg (216 lb 0.8 oz)   SpO2 95%   BMI 37.09 kg/m²     Vitals reviewed  GEN: ill appearing  HEENT: normocephalic, atraumatic  CV: RRR  PULM: tracheostomy, lungs clear anteriorly  GI: NT/ND, PEG  MSK: no joint swelling  NEURO: opens eyes  SKIN: no rash; HD catheter c/d/i       Labs:  Recent Labs   Lab 10/15/24  0316   WBC 4.2   RBC 2.17*   HGB 6.8*   HCT 20.5*         Recent Labs   Lab 10/15/24  0316 10/14/24  0219   SODIUM 134* 134*   POTASSIUM 3.4 3.9   CHLORIDE 101 102   CO2 23 24   BUN 60* 53*   CREATININE 2.89* 2.68*   CALCIUM 8.8 8.6   ALBUMIN  --  2.2*   BILIRUBIN  --  0.4   ALKPT  --  138*   GPT  --  9   AST  --  23   GLUCOSE 156* 179*        Imaging:  Esophagogastroduodenoscopy (EGD) w Tube Placement; PEG  Table formatting from the original result was not included.  Impression  The esophagus appeared normal.  Edematous, erythematous, hemorrhagic mucosa with erosion in the stomach  The duodenum appeared normal.  PEG tube placed in the stomach    Recommendation   Follow up with primary care physician  Pantoprazole 40 mg twice daily, okay to use PEG tube now for medications   and water and okay to use for tube feeding in 8 hours at 30 mL/h and   increase to goal per dietitian    Indication  Esophageal dysphagia    Post-Op Dx  See Impression Section    Staff  Staff Role   [facility] Tech   Lynn Sutton, VAISHALI Nurse   Mnady Scanlon RN Nurse   Radu Nur MD Gastroenterologist     Medications   See Anesthesia Record.     Preprocedure  A history and physical has been performed, and patient medication   allergies have been reviewed. The patient's tolerance of previous   anesthesia has been reviewed. The risks and benefits of the procedure and   the sedation options and risks were discussed with the patient. All   questions were answered and informed consent  obtained.    Details of the Procedure  The patient underwent moderate sedation, which was administered by the   procedural nurse. The patient's blood pressure, heart rate, level of   consciousness, oxygen, respirations, ETCO2 and ECG were monitored   throughout the procedure. The scope was advanced to the second part of the   duodenum. Retroflexion was performed in the cardia. The patient   experienced no blood loss. The procedure was not difficult. The patient   tolerated the procedure well. There were no apparent adverse events.     Findings & Interventions  The esophagus appeared normal.  Edematous, erythematous and hemorrhagic mucosa with erosion in the stomach  The duodenum appeared normal.  A PEG tube measuring 20 Fr was successfully placed in the stomach using a   deformable internal bolster via the pull technique after the site was   identified via transillumination and visualized indentation; distance from   external bolster to external end of tube: 4 cm; tube rotated freely to   confirm placement. Sternal incision was made with a scalpel, 1% lidocaine   local anesthetic was applied.    Specimens  No specimens collected       Pertinent Micro:   8/24 C diff toxin/PCR assays positive  9/18 BCx negative  9/18 RVP negative  9/18 MRSA nares positive  9/18 UCx negative  9/18 C diff PCR positive, toxin positive  9/24 Respiratory culture +yeast  9/26 BCx negative  10/1 Ascitic fluid cx negative, cytology pending  10/2 BAL Cx +yeast and Aspergillus fumigatus    Assessment & Plan      Septic shock and leukocytosis due to severe C difficile colitis  Acute on chronic hypoxic resp failure. CT showing GGO, mucous plugging, atelectasis. Required intubation 9/24. S/p tracheostomy 10/2. CT chest 10/3 shows pleural effusion.  Metastatic esophageal cancer on chemotherapy  ASHWIN requiring initiation of HD  Positive respiratory culture for Aspergillus fumigatus. Chest CT shows patchy bilateral opacities suspicious for infection,  but mostly just pleural effusions, difficult to discern if true infection radiographically. Opting to treat for now given immune compromised status    Recommendations:  - continue enteral vancomycin 125mg q6h. If we strongly felt that his hypotension was due to worsening C diff we could consider increasing dose again and adding back flagyl, but seems reasonable to me to continue current dosing and monitor  - continue Voriconazole for Aspergillosis. Start date 10/10/24. Duration TBD, typically 3-6 months with serial CT scan surveillance. Trough level pending. Repeat CT was reviewed, and it remains difficult to tell his this is true pulmonary infection or colonization    Relevant data, labs, micro, imaging personally reviewed.    Thank you for this consultation, will follow patient with you.     Deny Rubi MD   10/15/2024 9:38 AM  [facility] Infectious Disease and Internal Medicine     Unknown

## 2024-11-08 ENCOUNTER — NON-APPOINTMENT (OUTPATIENT)
Age: 34
End: 2024-11-08

## 2024-11-16 ENCOUNTER — APPOINTMENT (OUTPATIENT)
Dept: ORTHOPEDIC SURGERY | Facility: CLINIC | Age: 34
End: 2024-11-16
Payer: COMMERCIAL

## 2024-11-16 VITALS — BODY MASS INDEX: 29.64 KG/M2 | HEIGHT: 59 IN | WEIGHT: 147 LBS

## 2024-11-16 DIAGNOSIS — M62.830 MUSCLE SPASM OF BACK: ICD-10-CM

## 2024-11-16 DIAGNOSIS — S33.5XXA SPRAIN OF LIGAMENTS OF LUMBAR SPINE, INITIAL ENCOUNTER: ICD-10-CM

## 2024-11-16 DIAGNOSIS — M62.838 OTHER MUSCLE SPASM: ICD-10-CM

## 2024-11-16 DIAGNOSIS — S13.9XXA SPRAIN OF JOINTS AND LIGAMENTS OF UNSPECIFIED PARTS OF NECK, INITIAL ENCOUNTER: ICD-10-CM

## 2024-11-16 PROBLEM — G43.909 MIGRAINE, UNSPECIFIED, NOT INTRACTABLE, WITHOUT STATUS MIGRAINOSUS: Chronic | Status: ACTIVE | Noted: 2024-06-20

## 2024-11-16 PROCEDURE — 72170 X-RAY EXAM OF PELVIS: CPT

## 2024-11-16 PROCEDURE — 72100 X-RAY EXAM L-S SPINE 2/3 VWS: CPT

## 2024-11-16 PROCEDURE — 99203 OFFICE O/P NEW LOW 30 MIN: CPT

## 2024-11-16 PROCEDURE — 72040 X-RAY EXAM NECK SPINE 2-3 VW: CPT

## 2024-11-16 RX ORDER — TRAMADOL HYDROCHLORIDE 50 MG/1
50 TABLET, COATED ORAL
Qty: 12 | Refills: 0 | Status: ACTIVE | COMMUNITY
Start: 2024-11-16 | End: 1900-01-01

## 2025-02-24 NOTE — ED PROVIDER NOTE - PHYSICAL EXAMINATION
Kylie Myles, DO:   Gen: Well appearing, NAD  Head: NCAT  HEENT: PERRL, MMM, normal conjunctiva, anicteric, neck supple  Lung: CTAB, no adventitious sounds  CV: RRR, no murmurs  Abd: soft, NTND, no rebound or guarding, no CVAT  MSK: No edema, no visible deformities  Neuro: Ambulatory with stable gait.   Skin: Warm and dry, no evidence of rash, +skin folds consistent with weight loss.   Psych: normal mood and affect Kylie Myles, DO:   Gen: Well appearing, NAD  Head: NCAT  HEENT: PERRL, MMM, normal conjunctiva, anicteric, neck supple  Lung: CTAB, no adventitious sounds  CV: RRR, no murmurs  Abd: soft, NTND, no rebound or guarding, no CVAT  MSK: No edema, no visible deformities  Neuro: Ambulatory with stable gait.   Skin: Warm and dry, no evidence of rash, +skin folds consistent with weight loss.   Psych: normal mood and affect  SWANSON: No abd mass/distention, NO C/T/L/S spine midline TTP. Nonfocal, unremarkable exam,. Patient/Family

## 2025-03-08 ENCOUNTER — INPATIENT (INPATIENT)
Facility: HOSPITAL | Age: 35
LOS: 15 days | Discharge: HOME | DRG: 101 | End: 2025-03-24
Attending: STUDENT IN AN ORGANIZED HEALTH CARE EDUCATION/TRAINING PROGRAM | Admitting: FAMILY MEDICINE
Payer: COMMERCIAL

## 2025-03-08 VITALS
HEART RATE: 90 BPM | OXYGEN SATURATION: 99 % | WEIGHT: 105.82 LBS | SYSTOLIC BLOOD PRESSURE: 124 MMHG | DIASTOLIC BLOOD PRESSURE: 87 MMHG | TEMPERATURE: 97 F | RESPIRATION RATE: 17 BRPM

## 2025-03-08 DIAGNOSIS — G40.919 EPILEPSY, UNSPECIFIED, INTRACTABLE, WITHOUT STATUS EPILEPTICUS: ICD-10-CM

## 2025-03-08 LAB
ALBUMIN SERPL ELPH-MCNC: 4.3 G/DL — SIGNIFICANT CHANGE UP (ref 3.3–5.2)
ALP SERPL-CCNC: 61 U/L — SIGNIFICANT CHANGE UP (ref 40–120)
ALT FLD-CCNC: 21 U/L — SIGNIFICANT CHANGE UP
ANION GAP SERPL CALC-SCNC: 14 MMOL/L — SIGNIFICANT CHANGE UP (ref 5–17)
APAP SERPL-MCNC: 7.7 UG/ML — LOW (ref 10–26)
AST SERPL-CCNC: 21 U/L — SIGNIFICANT CHANGE UP
BASOPHILS # BLD AUTO: 0.07 K/UL — SIGNIFICANT CHANGE UP (ref 0–0.2)
BASOPHILS NFR BLD AUTO: 0.5 % — SIGNIFICANT CHANGE UP (ref 0–2)
BILIRUB SERPL-MCNC: 0.3 MG/DL — LOW (ref 0.4–2)
BUN SERPL-MCNC: 18.6 MG/DL — SIGNIFICANT CHANGE UP (ref 8–20)
CALCIUM SERPL-MCNC: 9.9 MG/DL — SIGNIFICANT CHANGE UP (ref 8.4–10.5)
CHLORIDE SERPL-SCNC: 99 MMOL/L — SIGNIFICANT CHANGE UP (ref 96–108)
CK SERPL-CCNC: 76 U/L — SIGNIFICANT CHANGE UP (ref 25–170)
CO2 SERPL-SCNC: 26 MMOL/L — SIGNIFICANT CHANGE UP (ref 22–29)
CREAT SERPL-MCNC: 0.65 MG/DL — SIGNIFICANT CHANGE UP (ref 0.5–1.3)
CRP SERPL-MCNC: <4 MG/L — SIGNIFICANT CHANGE UP
EGFR: 118 ML/MIN/1.73M2 — SIGNIFICANT CHANGE UP
EGFR: 118 ML/MIN/1.73M2 — SIGNIFICANT CHANGE UP
EOSINOPHIL # BLD AUTO: 0.1 K/UL — SIGNIFICANT CHANGE UP (ref 0–0.5)
EOSINOPHIL NFR BLD AUTO: 0.7 % — SIGNIFICANT CHANGE UP (ref 0–6)
ERYTHROCYTE [SEDIMENTATION RATE] IN BLOOD: 18 MM/HR — SIGNIFICANT CHANGE UP (ref 0–20)
ETHANOL SERPL-MCNC: <10 MG/DL — SIGNIFICANT CHANGE UP (ref 0–9)
GLUCOSE BLDC GLUCOMTR-MCNC: 101 MG/DL — HIGH (ref 70–99)
GLUCOSE SERPL-MCNC: 119 MG/DL — HIGH (ref 70–99)
HCG SERPL-ACNC: <4 MIU/ML — SIGNIFICANT CHANGE UP
HCT VFR BLD CALC: 39 % — SIGNIFICANT CHANGE UP (ref 34.5–45)
HGB BLD-MCNC: 12.3 G/DL — SIGNIFICANT CHANGE UP (ref 11.5–15.5)
IMM GRANULOCYTES # BLD AUTO: 0.06 K/UL — SIGNIFICANT CHANGE UP (ref 0–0.07)
IMM GRANULOCYTES NFR BLD AUTO: 0.4 % — SIGNIFICANT CHANGE UP (ref 0–0.9)
LYMPHOCYTES # BLD AUTO: 2.27 K/UL — SIGNIFICANT CHANGE UP (ref 1–3.3)
LYMPHOCYTES NFR BLD AUTO: 15.4 % — SIGNIFICANT CHANGE UP (ref 13–44)
MAGNESIUM SERPL-MCNC: 1.9 MG/DL — SIGNIFICANT CHANGE UP (ref 1.6–2.6)
MCHC RBC-ENTMCNC: 28 PG — SIGNIFICANT CHANGE UP (ref 27–34)
MCHC RBC-ENTMCNC: 31.5 G/DL — LOW (ref 32–36)
MCV RBC AUTO: 88.8 FL — SIGNIFICANT CHANGE UP (ref 80–100)
MONOCYTES # BLD AUTO: 0.96 K/UL — HIGH (ref 0–0.9)
MONOCYTES NFR BLD AUTO: 6.5 % — SIGNIFICANT CHANGE UP (ref 2–14)
NEUTROPHILS # BLD AUTO: 11.27 K/UL — HIGH (ref 1.8–7.4)
NEUTROPHILS NFR BLD AUTO: 76.5 % — SIGNIFICANT CHANGE UP (ref 43–77)
NRBC # BLD AUTO: 0 K/UL — SIGNIFICANT CHANGE UP (ref 0–0)
NRBC # FLD: 0 K/UL — SIGNIFICANT CHANGE UP (ref 0–0)
NRBC BLD AUTO-RTO: 0 /100 WBCS — SIGNIFICANT CHANGE UP (ref 0–0)
NT-PROBNP SERPL-SCNC: 253 PG/ML — SIGNIFICANT CHANGE UP (ref 0–300)
PLATELET # BLD AUTO: 320 K/UL — SIGNIFICANT CHANGE UP (ref 150–400)
PMV BLD: 11.3 FL — SIGNIFICANT CHANGE UP (ref 7–13)
POTASSIUM SERPL-MCNC: 5.1 MMOL/L — SIGNIFICANT CHANGE UP (ref 3.5–5.3)
POTASSIUM SERPL-SCNC: 5.1 MMOL/L — SIGNIFICANT CHANGE UP (ref 3.5–5.3)
PROT SERPL-MCNC: 7.9 G/DL — SIGNIFICANT CHANGE UP (ref 6.6–8.7)
RBC # BLD: 4.39 M/UL — SIGNIFICANT CHANGE UP (ref 3.8–5.2)
RBC # FLD: 12.2 % — SIGNIFICANT CHANGE UP (ref 10.3–14.5)
SALICYLATES SERPL-MCNC: <0.6 MG/DL — LOW (ref 10–20)
SODIUM SERPL-SCNC: 139 MMOL/L — SIGNIFICANT CHANGE UP (ref 135–145)
T3 SERPL-MCNC: 83 NG/DL — SIGNIFICANT CHANGE UP (ref 80–200)
T4 AB SER-ACNC: 5.5 UG/DL — SIGNIFICANT CHANGE UP (ref 4.5–12)
TSH SERPL-MCNC: 1.11 UIU/ML — SIGNIFICANT CHANGE UP (ref 0.27–4.2)
WBC # BLD: 14.73 K/UL — HIGH (ref 3.8–10.5)
WBC # FLD AUTO: 14.73 K/UL — HIGH (ref 3.8–10.5)

## 2025-03-08 PROCEDURE — 99223 1ST HOSP IP/OBS HIGH 75: CPT

## 2025-03-08 PROCEDURE — 99285 EMERGENCY DEPT VISIT HI MDM: CPT

## 2025-03-08 PROCEDURE — 70450 CT HEAD/BRAIN W/O DYE: CPT | Mod: 26

## 2025-03-08 PROCEDURE — 71045 X-RAY EXAM CHEST 1 VIEW: CPT | Mod: 26

## 2025-03-08 RX ORDER — ACETAMINOPHEN 500 MG/5ML
725 LIQUID (ML) ORAL ONCE
Refills: 0 | Status: COMPLETED | OUTPATIENT
Start: 2025-03-08 | End: 2025-03-08

## 2025-03-08 RX ORDER — SODIUM CHLORIDE 9 G/1000ML
1000 INJECTION, SOLUTION INTRAVENOUS
Refills: 0 | Status: DISCONTINUED | OUTPATIENT
Start: 2025-03-08 | End: 2025-03-24

## 2025-03-08 RX ORDER — LEVETIRACETAM 10 MG/ML
1000 INJECTION, SOLUTION INTRAVENOUS
Refills: 0 | Status: DISCONTINUED | OUTPATIENT
Start: 2025-03-08 | End: 2025-03-24

## 2025-03-08 RX ORDER — DEXTROSE 50 % IN WATER 50 %
25 SYRINGE (ML) INTRAVENOUS ONCE
Refills: 0 | Status: DISCONTINUED | OUTPATIENT
Start: 2025-03-08 | End: 2025-03-24

## 2025-03-08 RX ORDER — SERTRALINE 100 MG/1
50 TABLET, FILM COATED ORAL DAILY
Refills: 0 | Status: DISCONTINUED | OUTPATIENT
Start: 2025-03-08 | End: 2025-03-23

## 2025-03-08 RX ORDER — INSULIN LISPRO 100 U/ML
INJECTION, SOLUTION INTRAVENOUS; SUBCUTANEOUS AT BEDTIME
Refills: 0 | Status: DISCONTINUED | OUTPATIENT
Start: 2025-03-08 | End: 2025-03-10

## 2025-03-08 RX ORDER — ENOXAPARIN SODIUM 100 MG/ML
40 INJECTION SUBCUTANEOUS EVERY 24 HOURS
Refills: 0 | Status: DISCONTINUED | OUTPATIENT
Start: 2025-03-08 | End: 2025-03-24

## 2025-03-08 RX ORDER — DEXTROSE 50 % IN WATER 50 %
12.5 SYRINGE (ML) INTRAVENOUS ONCE
Refills: 0 | Status: DISCONTINUED | OUTPATIENT
Start: 2025-03-08 | End: 2025-03-24

## 2025-03-08 RX ORDER — METHOCARBAMOL 500 MG/1
500 TABLET, FILM COATED ORAL THREE TIMES A DAY
Refills: 0 | Status: DISCONTINUED | OUTPATIENT
Start: 2025-03-08 | End: 2025-03-24

## 2025-03-08 RX ORDER — INSULIN LISPRO 100 U/ML
INJECTION, SOLUTION INTRAVENOUS; SUBCUTANEOUS
Refills: 0 | Status: DISCONTINUED | OUTPATIENT
Start: 2025-03-08 | End: 2025-03-10

## 2025-03-08 RX ORDER — LORAZEPAM 4 MG/ML
2 VIAL (ML) INJECTION ONCE
Refills: 0 | Status: DISCONTINUED | OUTPATIENT
Start: 2025-03-08 | End: 2025-03-08

## 2025-03-08 RX ORDER — KETOROLAC TROMETHAMINE 30 MG/ML
15 INJECTION, SOLUTION INTRAMUSCULAR; INTRAVENOUS ONCE
Refills: 0 | Status: DISCONTINUED | OUTPATIENT
Start: 2025-03-08 | End: 2025-03-08

## 2025-03-08 RX ORDER — LEVETIRACETAM 10 MG/ML
1000 INJECTION, SOLUTION INTRAVENOUS ONCE
Refills: 0 | Status: COMPLETED | OUTPATIENT
Start: 2025-03-08 | End: 2025-03-08

## 2025-03-08 RX ORDER — DEXTROSE 50 % IN WATER 50 %
15 SYRINGE (ML) INTRAVENOUS ONCE
Refills: 0 | Status: DISCONTINUED | OUTPATIENT
Start: 2025-03-08 | End: 2025-03-24

## 2025-03-08 RX ORDER — SPIRONOLACTONE 25 MG
50 TABLET ORAL DAILY
Refills: 0 | Status: DISCONTINUED | OUTPATIENT
Start: 2025-03-08 | End: 2025-03-10

## 2025-03-08 RX ORDER — OXYCODONE HYDROCHLORIDE 30 MG/1
5 TABLET ORAL EVERY 6 HOURS
Refills: 0 | Status: DISCONTINUED | OUTPATIENT
Start: 2025-03-08 | End: 2025-03-12

## 2025-03-08 RX ORDER — ROSUVASTATIN CALCIUM 5 MG/1
5 TABLET, FILM COATED ORAL AT BEDTIME
Refills: 0 | Status: DISCONTINUED | OUTPATIENT
Start: 2025-03-08 | End: 2025-03-23

## 2025-03-08 RX ORDER — METOPROLOL SUCCINATE 50 MG/1
25 TABLET, EXTENDED RELEASE ORAL
Refills: 0 | Status: DISCONTINUED | OUTPATIENT
Start: 2025-03-08 | End: 2025-03-10

## 2025-03-08 RX ORDER — GLUCAGON 3 MG/1
1 POWDER NASAL ONCE
Refills: 0 | Status: DISCONTINUED | OUTPATIENT
Start: 2025-03-08 | End: 2025-03-24

## 2025-03-08 RX ADMIN — Medication 1000 MILLILITER(S): at 15:51

## 2025-03-08 RX ADMIN — ROSUVASTATIN CALCIUM 5 MILLIGRAM(S): 5 TABLET, FILM COATED ORAL at 23:22

## 2025-03-08 RX ADMIN — OXYCODONE HYDROCHLORIDE 5 MILLIGRAM(S): 30 TABLET ORAL at 23:21

## 2025-03-08 RX ADMIN — KETOROLAC TROMETHAMINE 15 MILLIGRAM(S): 30 INJECTION, SOLUTION INTRAMUSCULAR; INTRAVENOUS at 15:50

## 2025-03-08 RX ADMIN — METHOCARBAMOL 500 MILLIGRAM(S): 500 TABLET, FILM COATED ORAL at 23:21

## 2025-03-08 RX ADMIN — ENOXAPARIN SODIUM 40 MILLIGRAM(S): 100 INJECTION SUBCUTANEOUS at 23:22

## 2025-03-08 RX ADMIN — LEVETIRACETAM 400 MILLIGRAM(S): 10 INJECTION, SOLUTION INTRAVENOUS at 17:12

## 2025-03-08 RX ADMIN — Medication 1000 MILLILITER(S): at 18:51

## 2025-03-08 RX ADMIN — Medication 290 MILLIGRAM(S): at 15:51

## 2025-03-08 NOTE — H&P ADULT - VTE RISK ASSESSMENT
[facility] ED Note    Date of Service: 11/2/2024  Reason for Visit: Altered Mental Status      Patient History     HPI  Anny Tesfaye is a 80 y.o. female with past medical history of obesity, hyperlipidemia, CKD stage III, and recent AAA (s/p repair at the [facility] on 10/14/2024 complicated by also requiring a hemicolectomy at that time) who presents to the emergency department from her nursing facility for concerns for possible sepsis and confusion.  Patient is alert and orient x 4, but does appear dysarthric.  She states that she has been sleeping more and feeling weak.  She is unable to elaborate on her confusion and reports some very mild abdominal pain and some slight increased work of breathing.    Patient's niece is at bedside who is the power of .  She states that the patient has new onset dysarthria that started yesterday morning.  She states that she was initially confused on Wednesday morning, this improved, the plan was to obtain a urinalysis for possible UTI and do a further workup but she is unsure if this was done.  She notes the patient had a dysarthria yesterday morning and given her w no reported fever, no reported nausea or vomiting.  Worsening weakness and overall fatigue they presented to the ED. No blood from the ostomy, but she has had decreased output from the ostomy as well as decreased urination over the past few days.       Past Medical History:   Diagnosis Date    Aortic aneurysm (CMS-HCC)     Chronic low back pain     CKD (chronic kidney disease) stage 3, GFR 30-59 ml/min (Multi)     Esophagitis     Glaucoma     History of scarlet fever     Hypertension     Hypothyroidism     Morbid obesity (Multi)     Nephritis     OA (osteoarthritis) of knee     Bilateral    Polymyalgia rheumatica (Multi)     Psoriasis     Sciatica      Past Surgical History:   Procedure Laterality Date    ADENOIDECTOMY      CATARACT EXTRACTION  06/2020    CATARACT EXTRACTION Right      CATARACT EXTRACTION Left 03/2021    CROWN  06/2020    DENTAL CROWN REPLACEMENT    CT ANGIO NECK  04/21/2014    CT NECK ANGIO W AND WO IV CONTRAST [facility]    DILATION AND CURETTAGE OF UTERUS      TONSILLECTOMY      WISDOM TOOTH EXTRACTION           Physical Exam     Vitals:    11/02/24 0830 11/02/24 0930 11/02/24 1045 11/02/24 1200   BP: 116/81 102/66 100/75 (!) 131/94   BP Location: Left arm Left arm Left arm Left arm   Patient Position: Lying Lying Lying Lying   Pulse: (!) 103 (!) 114 (!) 101 97   Resp: (!) 24 (!) 24 (!) 24 (!) 24   Temp:       TempSrc:       SpO2: 96% 98% 96% 96%     General: Age-appropriate female, nondistressed, sitting comfortably in the gurney no acute distress.  Pulmonary: Nonlabored breathing.  Breath sounds clear bilaterally.  Hypoxia requiring 3 L NC O2.  Cardiac: Tachycardia with irregularly irregular rhythm  Abdomen: Soft.  Slight tenderness to palpation of the left lower quadrant.  Surgical incision of the midline well-healing without surrounding erythema, edema, or drainage.  Ostomy in place in left lower quadrant with no appreciable output.  No surrounding erythema, edema, or warmth.  Dressings are clean dry and intact.  Musculoskeletal:  No long bone deformity. No peripheral edema   Skin:  Dry, no rashes  Neuro: Alert and orient x 4.  Moves all 4 extremity spontaneously.  CN II to XII intact.  Appreciable dysarthria that makes it significantly hard to understand what the patient is saying.  Sensation intact light touch.    Diagnostic Studies     Labs:  Please see EMR for labs obtained during this patient encounter.    Radiology:  Please see EMR for imaging obtained during this patient encounter.    EKG:  Atrial fibrillation with RVR, ventricular rate of 108.  Left axis deviation.  Ventricular conduction in a nonspecific bundle branch block pattern with a prolonged QRS duration of 112 and a prolonged QTc interval 495.  No Q waves appreciated, normal ST segments, T wave  flattening appreciated in the limb leads.  No prior EKG available for comparison.      ED Course and MDM     Anny Tesfaye is a 80 y.o. female with a history and presentation as described above in HPI.      Upon presentation, the patient was afebrile, non-- toxic appearing, with vital signs notable for tachycardia and tachypnea.  Patient presented to the emergency room with possible confusion, dysarthria, and concern for increased shortness of breath with hypoxia.  There was concern for possible sepsis and therefore a septic workup was initiated on patient arrival.  Patient had blood cultures drawn as well as antibiotics given including vancomycin and Zosyn.  Differential diagnosis includes urosepsis, possible stroke given the dysarthria, possible pneumonia or possible pulmonary embolism.  Given patient's dysarthria, the remainder of her exam was nonfocal, she was alert and orient x 4, had intact cranial nerves and symmetric strength in her bilateral upper and lower extremities.  CT head was largely unremarkable.  In the absence of any signs of LVO with symptoms starting approximate 24 hours prior to arrival, code stroke was not called given that patient would not qualify for any TNK and therefore no further imaging was pursued.  Patient CT angio chest abdomen and pelvis did not show any focal findings concerning for infection.  It did show a known aneurysm and it did show pleural effusions possibly because of patient's shortness of breath and hypoxia.  Patient is EKG does not have any significant signs of ischemia arrhythmia and she does have a mildly elevated troponin of 23, stable on repeat, I have low suspicion for any primary cardiac component and feel that patient's troponin anemia is likely secondary to demand.  Patient also has findings concerning for being fluid down given a slight elevation in her creatinine however given her history of CHF with appearance of fluid overload and BNP of 1000, no fluids  were given given concern for worsening her fluid overload and her pleural effusions.  Patient's lactate was normal, she was perfusing well.  She did have a metabolic acidosis which was noted and will continue monitored while inpatient.  She will ultimately require admission for sepsis of unknown etiology, likely urosepsis and dysarthria.      Critical Care Time   CRITICAL CARE TIME    Upon my evaluation, this patient had a high probability of imminent or life-threatening deterioration due to hypoxia, which required my direct attention, intervention, and personal management.    I have personally provided 30 minutes of critical care time exclusive of time spent on separately billable procedures. Time includes review of laboratory data, radiology results, discussion with consultants, and monitoring for potential decompensation. Interventions were performed as documented above.    Impression     Diagnoses as of 11/02/24 1332   Sepsis with acute organ dysfunction, due to unspecified organism, unspecified organ dysfunction type, unspecified whether septic shock present (Multi)   Dysarthria        Plan       Admit to medicine, as floor status for further evaluation and management of sepsis      Quang Erazo MD  [facility] Emergency [facility]         Quang Erazo MD  11/02/24 1332     VTE Assessment already completed for this visit

## 2025-03-08 NOTE — ED ADULT NURSE NOTE - NSFALLRISKINTERV_ED_ALL_ED
Assistance OOB with selected safe patient handling equipment if applicable/Assistance with ambulation/Communicate fall risk and risk factors to all staff, patient, and family/Encourage patient to sit up slowly, dangle for a short time, stand at bedside before walking/Orthostatic vital signs/Provide visual cue: yellow wristband, yellow gown, etc/Reinforce activity limits and safety measures with patient and family/Call bell, personal items and telephone in reach/Instruct patient to call for assistance before getting out of bed/chair/stretcher/Non-slip footwear applied when patient is off stretcher/[place] to call system/Physically safe environment - no spills, clutter or unnecessary equipment/Purposeful Proactive Rounding/Room/bathroom lighting operational, light cord in reach

## 2025-03-08 NOTE — ED ADULT TRIAGE NOTE - TEMPERATURE IN CELSIUS (DEGREES C)
36.3 Sarecycline Counseling: Patient advised regarding possible photosensitivity and discoloration of the teeth, skin, lips, tongue and gums.  Patient instructed to avoid sunlight, if possible.  When exposed to sunlight, patients should wear protective clothing, sunglasses, and sunscreen.  The patient was instructed to call the office immediately if the following severe adverse effects occur:  hearing changes, easy bruising/bleeding, severe headache, or vision changes.  The patient verbalized understanding of the proper use and possible adverse effects of sarecycline.  All of the patient's questions and concerns were addressed.

## 2025-03-08 NOTE — ED PROVIDER NOTE - OBJECTIVE STATEMENT
33yo F PMHx CHF, TIA presents to the ED for seizures, lightheadedness and back pain. Patient states that since November she's had 6 hospitalizations where she was initial diagnosed with CHF, found to have fluid in her lungs which was removed, C.diff,  possible TIA and new onset seizures started on Keppra, with increased ambulatory difficulty. Today patient was trying to go to bathroom where she had an episode of lightheadedness, had to be placed on a commode, at which time she had seizure like activity for a few minutes, she notes it happened again, in which took 4-5min to return to baseline. Patient is accompanied by family who are having trouble taking care of her and also request FORREST placement. +sweats. +subjective fever +generalized back pain. no abdominal pain. no lower extremity edema. 35yo F PMHx CHF, TIA presents to the ED for seizures, lightheadedness and back pain. Patient states that since November she's had 6 hospitalizations where she was initial diagnosed with CHF, found to have fluid in her lungs which was removed, C.diff, infection,  possible TIA and new onset seizures started on Keppra, with increased ambulatory difficulty. Today patient was trying to go to bathroom where she had an episode of lightheadedness, had to be placed on a commode, at which time she had seizure like activity for a few minutes, she notes it happened again, in which took 4-5min to return to baseline. Patient is accompanied by family who are having trouble taking care of her and also request FORREST placement. +sweats. +subjective fever +generalized back pain. no abdominal pain. no lower extremity edema.

## 2025-03-08 NOTE — ED PROVIDER NOTE - CLINICAL SUMMARY MEDICAL DECISION MAKING FREE TEXT BOX
35yo F PMHx CHF, TIA presents to the ED for seizures, lightheadedness and back pain. 33yo F PMHx CHF, TIA presents to the ED for recurrent breakthrough seizures, lightheadedness and back pain, who is hemodynamically stable, saturating well on room air. Ordered cbc, cmp, thyroid, creatine kinase, esr, thyroid, proBNP, tylenol and ketorolac, urinalysis, ekg, cxr with neurology consult

## 2025-03-08 NOTE — H&P ADULT - NSICDXPASTMEDICALHX_GEN_ALL_CORE_FT
PAST MEDICAL HISTORY:  Diabetes mellitus     Migraines     Orthostatic hypotension     Seizure disorder     Systolic CHF, chronic

## 2025-03-08 NOTE — H&P ADULT - NSHPPHYSICALEXAM_GEN_ALL_CORE
T(C): 36.4 (03-08-25 @ 19:26), Max: 36.4 (03-08-25 @ 19:26)  HR: 100 (03-08-25 @ 19:26) (90 - 100)  BP: 163/96 (03-08-25 @ 19:26) (124/87 - 163/96)  RR: 19 (03-08-25 @ 19:26) (17 - 19)  SpO2: 100% (03-08-25 @ 19:26) (99% - 100%)    CONSTITUTIONAL: Well groomed, no apparent distress  EYES: PERRLA and symmetric, EOMI, No conjunctival or scleral injection, non-icteric  ENMT: Oral mucosa with moist membranes. Normal dentition; no pharyngeal injection or exudates  NECK: Supple, symmetric and without tracheal deviation   RESP: No respiratory distress, no use of accessory muscles; CTA b/l, no WRR  CV: RRR, +S1S2, no MRG; no JVD; no peripheral edema  GI: Soft, NT, ND, positive bowel sounds  MSK: Normal gait; No digital clubbing or cyanosis  NEURO: CN II-XII intact;, sensation intact in upper and lower extremities, motor intact  PSYCH: Appropriate insight/judgment; A+O x 3, mood and affect appropriate

## 2025-03-08 NOTE — H&P ADULT - HISTORY OF PRESENT ILLNESS
35 y/o F PMHx CHF, TIA, DM type 1 on insulin pump presents to the ED for break through seizures that occurred today at home. Patient states that since November she's had 6 hospitalizations where she was initial diagnosed with CHF with unclear etiology, TIA and new onset seizures started on Keppra, with increased ambulatory difficulty. Per Pt  was trying to go to bathroom where she had an episode of lightheadedness, had to be placed on a commode, at which time she had seizure like activity for a few minutes, she notes it happened again, in which took 4-5min to return to baseline witness by family. In addition, has trouble ambulating for past several days. Pt is being admitted for breakthrough Seizures.

## 2025-03-08 NOTE — ED PROVIDER NOTE - ATTENDING CONTRIBUTION TO CARE
35yo F PMHx CHF, TIA, seizure disorder, DM here for seizure like activity. Was on the commode had parents report she had shaking episode, that resolved after a few seconds. Then had another episode of shaking, unclear if there was return to baseline. Has not missed any keppra doses. Reports since November 2024 has had multiple hospitalizations leading to diagnosis of seizure, TIA and CHF. Also since these diagnosis reports she is unable to walk herself, always needs assistance to walk, family assists her. Reports multiple workups but people are unable to give her diagnosis    Ap - appears to be breakthrough seizure despite med compliance. will get labs, CT. family also requesting FORREST placement as she requires assistance in ADLs and family is having difficulty assisting.

## 2025-03-08 NOTE — ED ADULT TRIAGE NOTE - RESPIRATORY RATE (BREATHS/MIN)
2023       Michael Betancourt MD  [address] [facility] [address]  Via Fax: [phone]      Patient: Yuriy Muro   YOB: 1953   Date of Visit: 2023       Dear Dr. Betancourt:    I saw your patient, Yuriy Muro, for an evaluation. Below are my notes for this visit with him.    If you have questions, please do not hesitate to call me.      Sincerely,        Kody Khan MD        CC: Kody Ryenoso MD  2023 10:06 AM  Signed    [facility]  UROLOGY  __________________________________________    UROLOGY CLINIC NOTE    Patient: Yuriy Muro Date of Service: 2023   : 1953 MRN: [id number]     Chief Complaint   Patient presents with   • Follow-up     HISTORY OF PRESENT ILLNESS:  Yuriy Muro is a 70 year old male with history of bladder stones s/p cystoscopy, laser lithotripsy of bladder stones on 22, prostate cancer s/p HoLEP (found on specimen, path= augie score 7) and BPH s/p HoLEP on 22.  prostate biopsy demonstrates no malignancy. Patient here with 6 month PSA; however, patient did not complete PSA. Patient only endorses minimal leaking with heavy lifting.     PAST MEDICAL HISTORY:  Past Medical History:   Diagnosis Date   • BPH (benign prostatic hyperplasia)    • Hyperlipidemia    • Kidney stone    • Prostate cancer (CMD)    • Urinary tract infection 2023    treated, no symptoms currently   • Vitamin D deficiency        PAST SURGICAL HISTORY:  Past Surgical History:   Procedure Laterality Date   • Appendectomy     • Ercp     • Mri fusion guidance for prostate biopsy  2023    Dr. Khan   • Prostate biopsy     • Removal gallbladder         ALLERGIES:  ALLERGIES:  No Known Allergies    MEDICATIONS:  Current Outpatient Medications   Medication Sig   • ibuprofen (MOTRIN) 600 MG tablet Take 1 tablet by mouth every 6 hours as needed for Pain.   • atorvastatin (LIPITOR) 10 MG tablet Take 10 mg by  mouth every evening.   • cholecalciferol (VITAMIN D) 25 mcg (1,000 units) tablet Take by mouth daily. Stop now     No current facility-administered medications for this visit.       FAMILY HISTORY:  No family history of nephrolithiasis or genitourinary malignancy.    Family History   Problem Relation Age of Onset   • Cancer, Kidney Neg Hx    • Cancer, Prostate Neg Hx        SOCIAL HISTORY:  Social History     Tobacco Use   • Smoking status: Former     Current packs/day: 0.00     Types: Cigarettes     Quit date:      Years since quittin.7   • Smokeless tobacco: Never   • Tobacco comments:     QUIT- in about    Vaping Use   • Vaping Use: never used   Substance Use Topics   • Alcohol use: Not Currently   • Drug use: Never       REVIEW OF SYSTEMS:  Constitutional: Negative for fever and chills.   Skin: Negative for rash.   HEENT: Negative for eye drainage, ear pain, sore throat.  Respiratory: Negative cough or shortness of breath.    Cardiovascular: Negative for chest pain or chest pressure.   Gastrointestinal: Negative for nausea, vomiting, diarrhea.   Genitourinary: Negative for dysuria, frequency or hematuria.  Extremities:  Negative for joint swelling or joint pain.  Endocrine: Negative for heat or cold intolerance.  Psych: Negative for change in mood or mentation.    PHYSICAL EXAM:  BP (!) 153/76 (BP Location: LUE - Left upper extremity)   Pulse 61   Ht 5' 2\" (1.575 m)   Wt 103.6 kg (228 lb 8 oz)   BMI 41.79 kg/m²   BSA 2.02 m²      General: Awake, well nourished, no acute distress.  Eye: EOMI, normal conjunctiva.  HENT: Normocephalic, normal hearing, moist oral mucosa, no scleral icterus.  Neck: Supple, non-tender.  Lungs: Unlabored respiration.  Heart: Regular rate, no edema.  Abdomen: Soft, non-tender, non-distended   Genitourinary: No CVA tenderness bilaterally.  Musculoskeletal: Normal range of motion in upper and lower extremities. No tenderness or swelling.  Skin: Skin is warm, dry and  pink, no rashes or lesions.  Neurologic: Alert and oriented X3.  Psychiatric: Cooperative, appropriate mood and affect.    LAB/PATHOLOGY RESULTS:  I have personally reviewed the pertinent laboratory tests, and discussed them with the patient. These are the pertinent findings:    HGB (g/dL)   Date Value   06/13/2022 13.6     Creatinine (mg/dL)   Date Value   06/22/2023 0.90     Prostate Specific Antigen (ng/mL)   Date Value   02/28/2023 0.70     \"Prostate\"; enucleation:     -- Prostatic adenocarcinoma, Augie grade 3+4 = 7/10, grade group 2, approximately 5% of examined prostate tissue     ASSESSMENT AND PLAN:  This is a 70 year old year-old male who presents with a history of BPH with bladder stones s/p HoLEP on 11/30/23. HoLEP pathology demonstrated augie 7 (3+4). Prostate biopsy on 03/08/23 demonstrates no malignancy. Currently on PSA surveillance.     #Bladder stones  #BPH  - Doing very well after prostate surgery.  - Patient is very happy with the results.  - Will continue to monitor symptoms.  - Kegel handout provided to patient.  - Will repeat flow/PVR in 6 months.    #Prostate Cancer  - Discussed significance of prostate cancer seen on TURP specimen  - PSA reviewed and elevated. MRI results reviewed with patient;PIRADS score reviewed as well.  - Prostate biopsy results reviewed with patient, tolerated procedure well without complication.  - Pathology reviewed with patient and reveals benign findings; reliability of biopsy reviewed.  - Explained importance of continued close PSA surveillance; recommend psa in 6 months. If stable, then will monitor every 12 months.  - Patient is comfortable with this plan.  - We will repeat PSA today and again in 6 months.    RTC in 6 months with repeat PSA.    The patient indicated understanding of the diagnosis and agreed with the plan of care.    Kody Khan MD, TAN  [place] [facility] [place]    Total time spent today on this visit  is  > 40 minutes which includes preparing to see  the patient by reviewing prior records, obtaining and reviewing history, performing a physical exam, counseling the patient, documenting clinical information in the medical record, ordering medications/tests/procedures, communicating test results to the patient and coordinating care.    On 9/26/2023, I, Gala Shah, scribed the services personally performed by Dr. Kody Khan MD.  The documentation recorded by the scribe accurately and completely reflects the service(s) I personally performed and the decisions made by me.      17

## 2025-03-08 NOTE — ED ADULT NURSE NOTE - OBJECTIVE STATEMENT
pt arrived c/o 2 seizures back to back while sitting on the bedside commode at home . first seizure was between 1:05pm-1:15pm and the second one was right after . First one lasted 2 min and the second lasted 3 minis . pt denies the bitting her tongue or hitting her head . family verified this information . Pt is currently taking Keppra for seizures that started in Jan 2025 . Pt also complaining of back pain . Vitals signs stable O2 Stat 100% on room air . Family at bedside

## 2025-03-08 NOTE — ED ADULT NURSE NOTE - TEMPLATE LIST FOR HEAD TO TOE ASSESSMENT
Neuro Consent 3/Introductory Paragraph: I gave the patient a chance to ask questions they had about the procedure.  Following this I explained the Mohs procedure and consent was obtained. The risks, benefits and alternatives to therapy were discussed in detail. Specifically, the risks of infection, scarring, bleeding, prolonged wound healing, incomplete removal, allergy to anesthesia, nerve injury and recurrence were addressed. Prior to the procedure, the treatment site was clearly identified and confirmed by the patient. All components of Universal Protocol/PAUSE Rule completed.

## 2025-03-08 NOTE — ED PROVIDER NOTE - PHYSICAL EXAMINATION
Gen: young female under multiple layers of blankets, appears tired  Head: normocephalic, atraumatic  EENT: EOMI, moist mucous membranes, no scleral icterus  Lung: no increased work of breathing, clear to auscultation bilaterally, no wheezing, rales, rhonchi, speaking in full sentences  CV: regular rate, regular rhythm, normal s1/s2, 2+ radial pulses bilaterally  Abd: soft, non-tender, non-distended,    MSK: No edema, no visible deformities, full range of motion in all 4 extremities  Neuro: Awake, alert, no focal neurologic deficits  Skin: No obvious rash, no jaundice  Psych: normal affect, normal speech
ts rn

## 2025-03-08 NOTE — ED ADULT TRIAGE NOTE - CHIEF COMPLAINT QUOTE
Pt BIBEMS complaining of seizure activity x2 witnessed by mother. Mother denies headstrike, use of blood thinners. Pt with hx of seizures on keppra. Compliant with medications. Denies pain/discomfort at this time. pt states "I feel tired" BEFAST negative. .

## 2025-03-08 NOTE — ED PROVIDER NOTE - CADM POA URETHRAL CATHETER
No

## 2025-03-08 NOTE — H&P ADULT - NSHPLABSRESULTS_GEN_ALL_CORE
12.3   14.73 )-----------( 320      ( 08 Mar 2025 15:55 )             39.0       HbA1c: A1C with Estimated Average Glucose Result: 16.2 % (06-20-24 @ 09:50)    03-08    139  |  99  |  18.6  ----------------------------<  119[H]  5.1   |  26.0  |  0.65    Ca    9.9      08 Mar 2025 15:55  Mg     1.9     03-08    TPro  7.9  /  Alb  4.3  /  TBili  0.3[L]  /  DBili  x   /  AST  21  /  ALT  21  /  AlkPhos  61  03-08

## 2025-03-08 NOTE — PATIENT PROFILE ADULT - FALL HARM RISK - HARM RISK INTERVENTIONS
Assistance with ambulation/Assistance OOB with selected safe patient handling equipment/Communicate Risk of Fall with Harm to all staff/Reinforce activity limits and safety measures with patient and family/Sit up slowly, dangle for a short time, stand at bedside before walking/Tailored Fall Risk Interventions/Visual Cue: Yellow wristband and red socks/Bed in lowest position, wheels locked, appropriate side rails in place/Call bell, personal items and telephone in reach/Instruct patient to call for assistance before getting out of bed or chair/Non-slip footwear when patient is out of bed/[place] to call system/Physically safe environment - no spills, clutter or unnecessary equipment/Purposeful Proactive Rounding/Room/bathroom lighting operational, light cord in reach

## 2025-03-08 NOTE — ED ADULT NURSE NOTE - NS ED NOTE ABUSE SUSPICION NEGLECT YN
Bilateral TAP block      Patient reassessed immediately prior to procedure    Patient location during procedure: pre-op  Start time: 6/2/2022 1:16 PM  Stop time: 6/2/2022 1:19 PM  Reason for block: at surgeon's request and post-op pain management  Performed by  Anesthesiologist: Britni Meza MD  Preanesthetic Checklist  Completed: patient identified, IV checked, site marked, risks and benefits discussed, surgical consent, monitors and equipment checked, pre-op evaluation and timeout performed  Prep:  Pt Position: sitting  Sterile barriers:cap, gloves and mask  Prep: ChloraPrep  Patient monitoring: blood pressure monitoring, continuous pulse oximetry and EKG  Procedure    Sedation: yes  Performed under: general  Guidance:ultrasound guided    ULTRASOUND INTERPRETATION. Using ultrasound guidance a 21 G gauge needle was placed in close proximity to the nerve, at which point, under ultrasound guidance anesthetic was injected in the area of the nerve and spread of the anesthesia was seen on ultrasound in close proximity thereto.  There were no abnormalities seen on ultrasound; a digital image was taken; and the patient tolerated the procedure with no complications. Images:still images not obtained    Laterality:Bilateral  Block Type:TAP  Injection Technique:single-shot  Needle Type:short-bevel and echogenic  Needle Gauge:21 G  Resistance on Injection: none    Medications Used: bupivacaine liposome (EXPAREL) 1.3 % injection, 20 mL  bupivacaine PF (MARCAINE) 0.25 % injection, 40 mL  Med administered at 6/2/2022 1:19 PM      Medications  Comment:Ultrasound Interpretation:  Using ultrasound guidance the needle was placed in close proximity to the target nerve and anesthetic was injected in the area of the target nerve and/or bundles, and spread of the anesthetic was seen on ultrasound in close proximity thereto.  There were no abnormalities seen on ultrasound; a digital image was taken; and the patient tolerated the  procedure with no complications.    Block placed for postoperative pain control per surgeon request.       Post Assessment  Injection Assessment: negative aspiration for heme, no paresthesia on injection and incremental injection  Patient Tolerance:comfortable throughout block  Complications:no             No

## 2025-03-08 NOTE — ED ADULT NURSE REASSESSMENT NOTE - NS ED NURSE REASSESS COMMENT FT1
Assumed care of pt at 19:15 from Kerry DRAKE. Pt resting in bed comfortably. NAD. RR even and unlabored. Pt updated on the plan of care and verbalizes understanding.

## 2025-03-08 NOTE — ED PROVIDER NOTE - PROGRESS NOTE DETAILS
Wiley HUIZAR- 34-year-old female with type 1 diabetes on insulin pump started having shortness of breath and was diagnosed with CHF in October 2024 followed by mild stroke and seizures and currently on Keppra 500 mg twice daily and has not been able to walk today presented with 2 episodes of seizures.  Patient is currently being admitted for multiple seizures today despite being on Keppra and was loaded with Keppra.  Patient has not seen a neurologist yet because every time she has followed up something is happening she also has episode of pleural fluid drainage at [facility] but chest x-ray today is negative.  I spoke to the mother sister and brother-in-law at the bedside.  Patient is unable to walk and has been requiring care by the family.  Patient has no known diagnosis for CHF as well neurology consult was placed.

## 2025-03-08 NOTE — H&P ADULT - ASSESSMENT
33 y/o F PMHx CHF, TIA, DM type 1 on insulin pump presents to the ED for break through seizures that occurred today at home. Patient states that since November she's had 6 hospitalizations where she was initial diagnosed with CHF with unclear etiology, TIA and new onset seizures started on Keppra, with increased ambulatory difficulty. Per Pt  was trying to go to bathroom where she had an episode of lightheadedness, had to be placed on a commode, at which time she had seizure like activity for a few minutes, she notes it happened again, in which took 4-5min to return to baseline witness by family. In addition, has trouble ambulating for past several days. Pt is being admitted for breakthrough Seizures.    1) Breakthrough seizure  - admit to tele EMU   - s/p Keppra 1000mg in ED  - continue keppra 1000mg po BID  - seizure precautions  - CT head negative per report  - neuro Dr. Land consulted     2) Leukocytosis  - likely reactive with recent seizure episode  - pt denies any fever, resp sx, or sx of infection  - repeat cbc in am    3) DM type 1 insulin dependent  - on insulin pump with sensor on right arm. Pt will self monitor during hospital stay  - CSI with meals  - diabetic diet  - check A1c level  - endo Dr. Carpenter consulted    4) Chronic Systolic CHF  - continue spirolactone 50mg QD  - metoprolol 25mg bid   - clinically euvolemic at this time    5) orthostatic hypotension  - currently bp is elevated    - hold midodrine 10mg TID  - monitor vitals, if indicated will restarted    6) DVT ppx  - lovenox 40mg SQ QD    Plan discussed with pt, answered all her questions, understands and agrees.

## 2025-03-09 DIAGNOSIS — E11.9 TYPE 2 DIABETES MELLITUS WITHOUT COMPLICATIONS: ICD-10-CM

## 2025-03-09 DIAGNOSIS — I95.1 ORTHOSTATIC HYPOTENSION: ICD-10-CM

## 2025-03-09 DIAGNOSIS — D72.829 ELEVATED WHITE BLOOD CELL COUNT, UNSPECIFIED: ICD-10-CM

## 2025-03-09 DIAGNOSIS — R56.9 UNSPECIFIED CONVULSIONS: ICD-10-CM

## 2025-03-09 DIAGNOSIS — I50.22 CHRONIC SYSTOLIC (CONGESTIVE) HEART FAILURE: ICD-10-CM

## 2025-03-09 LAB
A1C WITH ESTIMATED AVERAGE GLUCOSE RESULT: 7 % — HIGH (ref 4–5.6)
ANION GAP SERPL CALC-SCNC: 13 MMOL/L — SIGNIFICANT CHANGE UP (ref 5–17)
APPEARANCE UR: ABNORMAL
BACTERIA # UR AUTO: ABNORMAL /HPF
BILIRUB UR-MCNC: NEGATIVE — SIGNIFICANT CHANGE UP
BUN SERPL-MCNC: 19.7 MG/DL — SIGNIFICANT CHANGE UP (ref 8–20)
CALCIUM SERPL-MCNC: 8.6 MG/DL — SIGNIFICANT CHANGE UP (ref 8.4–10.5)
CAST: 2 /LPF — SIGNIFICANT CHANGE UP (ref 0–4)
CHLORIDE SERPL-SCNC: 103 MMOL/L — SIGNIFICANT CHANGE UP (ref 96–108)
CO2 SERPL-SCNC: 21 MMOL/L — LOW (ref 22–29)
COLOR SPEC: YELLOW — SIGNIFICANT CHANGE UP
CREAT SERPL-MCNC: 0.56 MG/DL — SIGNIFICANT CHANGE UP (ref 0.5–1.3)
DIFF PNL FLD: ABNORMAL
EGFR: 123 ML/MIN/1.73M2 — SIGNIFICANT CHANGE UP
EGFR: 123 ML/MIN/1.73M2 — SIGNIFICANT CHANGE UP
ESTIMATED AVERAGE GLUCOSE: 154 MG/DL — HIGH (ref 68–114)
GLUCOSE BLDC GLUCOMTR-MCNC: 138 MG/DL — HIGH (ref 70–99)
GLUCOSE BLDC GLUCOMTR-MCNC: 140 MG/DL — HIGH (ref 70–99)
GLUCOSE BLDC GLUCOMTR-MCNC: 164 MG/DL — HIGH (ref 70–99)
GLUCOSE BLDC GLUCOMTR-MCNC: 98 MG/DL — SIGNIFICANT CHANGE UP (ref 70–99)
GLUCOSE SERPL-MCNC: 148 MG/DL — HIGH (ref 70–99)
GLUCOSE UR QL: NEGATIVE MG/DL — SIGNIFICANT CHANGE UP
HCT VFR BLD CALC: 32.1 % — LOW (ref 34.5–45)
HGB BLD-MCNC: 10 G/DL — LOW (ref 11.5–15.5)
KETONES UR-MCNC: NEGATIVE MG/DL — SIGNIFICANT CHANGE UP
LEUKOCYTE ESTERASE UR-ACNC: ABNORMAL
MCHC RBC-ENTMCNC: 28.2 PG — SIGNIFICANT CHANGE UP (ref 27–34)
MCHC RBC-ENTMCNC: 31.2 G/DL — LOW (ref 32–36)
MCV RBC AUTO: 90.4 FL — SIGNIFICANT CHANGE UP (ref 80–100)
NITRITE UR-MCNC: NEGATIVE — SIGNIFICANT CHANGE UP
NRBC # BLD AUTO: 0 K/UL — SIGNIFICANT CHANGE UP (ref 0–0)
NRBC # FLD: 0 K/UL — SIGNIFICANT CHANGE UP (ref 0–0)
NRBC BLD AUTO-RTO: 0 /100 WBCS — SIGNIFICANT CHANGE UP (ref 0–0)
PH UR: 5.5 — SIGNIFICANT CHANGE UP (ref 5–8)
PLATELET # BLD AUTO: 258 K/UL — SIGNIFICANT CHANGE UP (ref 150–400)
PMV BLD: 11.4 FL — SIGNIFICANT CHANGE UP (ref 7–13)
POTASSIUM SERPL-MCNC: 3.8 MMOL/L — SIGNIFICANT CHANGE UP (ref 3.5–5.3)
POTASSIUM SERPL-SCNC: 3.8 MMOL/L — SIGNIFICANT CHANGE UP (ref 3.5–5.3)
PROT UR-MCNC: SIGNIFICANT CHANGE UP MG/DL
RAPID RVP RESULT: SIGNIFICANT CHANGE UP
RBC # BLD: 3.55 M/UL — LOW (ref 3.8–5.2)
RBC # FLD: 12.1 % — SIGNIFICANT CHANGE UP (ref 10.3–14.5)
RBC CASTS # UR COMP ASSIST: 1 /HPF — SIGNIFICANT CHANGE UP (ref 0–4)
SARS-COV-2 RNA SPEC QL NAA+PROBE: SIGNIFICANT CHANGE UP
SODIUM SERPL-SCNC: 137 MMOL/L — SIGNIFICANT CHANGE UP (ref 135–145)
SP GR SPEC: 1.02 — SIGNIFICANT CHANGE UP (ref 1–1.03)
SQUAMOUS # UR AUTO: 3 /HPF — SIGNIFICANT CHANGE UP (ref 0–5)
T4 FREE SERPL-MCNC: 1.2 NG/DL — SIGNIFICANT CHANGE UP (ref 0.9–1.7)
UROBILINOGEN FLD QL: 0.2 MG/DL — SIGNIFICANT CHANGE UP (ref 0.2–1)
WBC # BLD: 10.9 K/UL — HIGH (ref 3.8–10.5)
WBC # FLD AUTO: 10.9 K/UL — HIGH (ref 3.8–10.5)
WBC UR QL: 10 /HPF — HIGH (ref 0–5)

## 2025-03-09 PROCEDURE — 95720 EEG PHY/QHP EA INCR W/VEEG: CPT

## 2025-03-09 PROCEDURE — 99233 SBSQ HOSP IP/OBS HIGH 50: CPT

## 2025-03-09 PROCEDURE — 99223 1ST HOSP IP/OBS HIGH 75: CPT

## 2025-03-09 RX ORDER — MELATONIN 5 MG
5 TABLET ORAL ONCE
Refills: 0 | Status: COMPLETED | OUTPATIENT
Start: 2025-03-09 | End: 2025-03-09

## 2025-03-09 RX ORDER — INSULIN LISPRO 100 U/ML
1 INJECTION, SOLUTION INTRAVENOUS; SUBCUTANEOUS
Refills: 0 | Status: DISCONTINUED | OUTPATIENT
Start: 2025-03-09 | End: 2025-03-24

## 2025-03-09 RX ORDER — MIDODRINE HYDROCHLORIDE 5 MG/1
10 TABLET ORAL THREE TIMES A DAY
Refills: 0 | Status: DISCONTINUED | OUTPATIENT
Start: 2025-03-09 | End: 2025-03-13

## 2025-03-09 RX ORDER — ACETAMINOPHEN 500 MG/5ML
725 LIQUID (ML) ORAL ONCE
Refills: 0 | Status: DISCONTINUED | OUTPATIENT
Start: 2025-03-09 | End: 2025-03-10

## 2025-03-09 RX ORDER — SODIUM CHLORIDE 9 G/1000ML
1000 INJECTION, SOLUTION INTRAVENOUS ONCE
Refills: 0 | Status: COMPLETED | OUTPATIENT
Start: 2025-03-09 | End: 2025-03-09

## 2025-03-09 RX ORDER — MELATONIN 5 MG
5 TABLET ORAL AT BEDTIME
Refills: 0 | Status: DISCONTINUED | OUTPATIENT
Start: 2025-03-09 | End: 2025-03-24

## 2025-03-09 RX ADMIN — METHOCARBAMOL 500 MILLIGRAM(S): 500 TABLET, FILM COATED ORAL at 23:08

## 2025-03-09 RX ADMIN — OXYCODONE HYDROCHLORIDE 5 MILLIGRAM(S): 30 TABLET ORAL at 16:31

## 2025-03-09 RX ADMIN — LEVETIRACETAM 1000 MILLIGRAM(S): 10 INJECTION, SOLUTION INTRAVENOUS at 05:38

## 2025-03-09 RX ADMIN — METOPROLOL SUCCINATE 25 MILLIGRAM(S): 50 TABLET, EXTENDED RELEASE ORAL at 05:38

## 2025-03-09 RX ADMIN — OXYCODONE HYDROCHLORIDE 5 MILLIGRAM(S): 30 TABLET ORAL at 00:21

## 2025-03-09 RX ADMIN — SODIUM CHLORIDE 1000 MILLILITER(S): 9 INJECTION, SOLUTION INTRAVENOUS at 14:15

## 2025-03-09 RX ADMIN — METHOCARBAMOL 500 MILLIGRAM(S): 500 TABLET, FILM COATED ORAL at 14:15

## 2025-03-09 RX ADMIN — Medication 5 MILLIGRAM(S): at 00:53

## 2025-03-09 RX ADMIN — Medication 5 MILLIGRAM(S): at 23:09

## 2025-03-09 RX ADMIN — SERTRALINE 50 MILLIGRAM(S): 100 TABLET, FILM COATED ORAL at 10:16

## 2025-03-09 RX ADMIN — OXYCODONE HYDROCHLORIDE 5 MILLIGRAM(S): 30 TABLET ORAL at 15:18

## 2025-03-09 RX ADMIN — MIDODRINE HYDROCHLORIDE 10 MILLIGRAM(S): 5 TABLET ORAL at 10:16

## 2025-03-09 RX ADMIN — METHOCARBAMOL 500 MILLIGRAM(S): 500 TABLET, FILM COATED ORAL at 05:38

## 2025-03-09 RX ADMIN — OXYCODONE HYDROCHLORIDE 5 MILLIGRAM(S): 30 TABLET ORAL at 05:38

## 2025-03-09 RX ADMIN — OXYCODONE HYDROCHLORIDE 5 MILLIGRAM(S): 30 TABLET ORAL at 06:38

## 2025-03-09 RX ADMIN — LEVETIRACETAM 1000 MILLIGRAM(S): 10 INJECTION, SOLUTION INTRAVENOUS at 16:45

## 2025-03-09 RX ADMIN — ROSUVASTATIN CALCIUM 5 MILLIGRAM(S): 5 TABLET, FILM COATED ORAL at 23:09

## 2025-03-09 RX ADMIN — METOPROLOL SUCCINATE 25 MILLIGRAM(S): 50 TABLET, EXTENDED RELEASE ORAL at 16:45

## 2025-03-09 RX ADMIN — Medication 50 MILLIGRAM(S): at 05:38

## 2025-03-09 NOTE — PROGRESS NOTE ADULT - SUBJECTIVE AND OBJECTIVE BOX
< from: CT Head No Cont (03.08.25 @ 16:31) >  IMPRESSION:    No acute intracranial bleeding.    Enlarged nasopharyngeal soft tissue, greater than expected for enlarged   adenoids for patient's age    < end of copied text >   Patient is a 34y old  Female who presents with a chief complaint of Breakthrough seizures (09 Mar 2025 11:24)      pt is c/o fatigue, was lightheaded early am but resolved now. Denies fever,chill.  REVIEW OF SYSTEMS: All systems are reviewed and found to be negative except above    MEDICATIONS  (STANDING):  dextrose 5%. 1000 milliLiter(s) (50 mL/Hr) IV Continuous <Continuous>  dextrose 5%. 1000 milliLiter(s) (100 mL/Hr) IV Continuous <Continuous>  dextrose 50% Injectable 25 Gram(s) IV Push once  dextrose 50% Injectable 12.5 Gram(s) IV Push once  dextrose 50% Injectable 25 Gram(s) IV Push once  enoxaparin Injectable 40 milliGRAM(s) SubCutaneous every 24 hours  glucagon  Injectable 1 milliGRAM(s) IntraMuscular once  insulin lispro (ADMELOG) corrective regimen sliding scale   SubCutaneous three times a day before meals  insulin lispro (ADMELOG) corrective regimen sliding scale   SubCutaneous at bedtime  levETIRAcetam 1000 milliGRAM(s) Oral two times a day  melatonin 5 milliGRAM(s) Oral at bedtime  methocarbamol 500 milliGRAM(s) Oral three times a day  metoprolol tartrate 25 milliGRAM(s) Oral two times a day  midodrine. 10 milliGRAM(s) Oral three times a day  rosuvastatin 5 milliGRAM(s) Oral at bedtime  sertraline 50 milliGRAM(s) Oral daily  spironolactone 50 milliGRAM(s) Oral daily    MEDICATIONS  (PRN):  dextrose Oral Gel 15 Gram(s) Oral once PRN Blood Glucose LESS THAN 70 milliGRAM(s)/deciliter  LORazepam   Injectable 2 milliGRAM(s) IV Push once PRN Seizure Activity  oxyCODONE    IR 5 milliGRAM(s) Oral every 6 hours PRN Severe Pain (7 - 10)      CAPILLARY BLOOD GLUCOSE      POCT Blood Glucose.: 138 mg/dL (09 Mar 2025 11:29)  POCT Blood Glucose.: 98 mg/dL (09 Mar 2025 08:42)  POCT Blood Glucose.: 101 mg/dL (08 Mar 2025 22:34)  POCT Blood Glucose.: 128 mg/dL (08 Mar 2025 14:26)    I&O's Summary      PHYSICAL EXAM:  Vital Signs Last 24 Hrs  T(C): 36.6 (09 Mar 2025 11:07), Max: 36.7 (09 Mar 2025 08:50)  T(F): 97.8 (09 Mar 2025 11:07), Max: 98.1 (09 Mar 2025 08:50)  HR: 74 (09 Mar 2025 11:07) (74 - 100)  BP: 126/81 (09 Mar 2025 11:07) (102/66 - 163/96)  BP(mean): 96 (09 Mar 2025 05:34) (96 - 96)  RR: 19 (09 Mar 2025 11:07) (17 - 19)  SpO2: 99% (09 Mar 2025 11:07) (96% - 100%)    Parameters below as of 09 Mar 2025 11:07  Patient On (Oxygen Delivery Method): room air        CONSTITUTIONAL: NAD,  EYES: PERRLA; conjunctiva and sclera clear  ENMT: Moist oral mucosa,   RESPIRATORY: Normal respiratory effort; lungs are clear to auscultation bilaterally  CARDIOVASCULAR: Regular rate and rhythm, normal S1 and S2, no murmur   EXTS: No lower extremity edema; Peripheral pulses are 2+ bilaterally  ABDOMEN: Nontender to palpation, normoactive bowel sounds, no rebound/guarding;   MUSCLOSKELETAL:  no joint swelling or tenderness to palpation  PSYCH: affect appropriate  NEUROLOGY: A+O to person, place, and time; CN 2-12 are intact and symmetric; no gross sensory deficits;       LABS:                        10.0   10.90 )-----------( 258      ( 09 Mar 2025 04:54 )             32.1     03-09    137  |  103  |  19.7  ----------------------------<  148[H]  3.8   |  21.0[L]  |  0.56    Ca    8.6      09 Mar 2025 04:54  Mg     1.9     03-08    TPro  7.9  /  Alb  4.3  /  TBili  0.3[L]  /  DBili  x   /  AST  21  /  ALT  21  /  AlkPhos  61  03-08          Urinalysis Basic - ( 09 Mar 2025 04:54 )    Color: x / Appearance: x / SG: x / pH: x  Gluc: 148 mg/dL / Ketone: x  / Bili: x / Urobili: x   Blood: x / Protein: x / Nitrite: x   Leuk Esterase: x / RBC: x / WBC x   Sq Epi: x / Non Sq Epi: x / Bacteria: x          RADIOLOGY & ADDITIONAL TESTS:  Results Reviewed:              from: CT Head No Cont (03.08.25 @ 16:31) >  IMPRESSION:    No acute intracranial bleeding.    Enlarged nasopharyngeal soft tissue, greater than expected for enlarged   adenoids for patient's age    < end of copied text >

## 2025-03-09 NOTE — CONSULT NOTE ADULT - SUBJECTIVE AND OBJECTIVE BOX
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 Admitted for seizures  Consult for diabetes mgmt, a1c 7      She follows with endocrinologist Dr. Horowitz.   diagnosed with diabetes 2012  meds: insulin pump, metformin???    basal    icr    isf    target          PAST MEDICAL & SURGICAL HISTORY:  Diabetes mellitus    Migraines    Systolic CHF, chronic    Orthostatic hypotension    Seizure disorder    No significant past surgical history        Social History:  Pt denies hx of alcohol, tobacco or drug abuse (08 Mar 2025 20:23)      FAMILY HISTORY:  FHx: pancreatic cancer          Allergies    No Known Allergies    Intolerances        ROS as noted in the HPI    MEDICATIONS  (STANDING):  dextrose 5%. 1000 milliLiter(s) (50 mL/Hr) IV Continuous <Continuous>  dextrose 5%. 1000 milliLiter(s) (100 mL/Hr) IV Continuous <Continuous>  dextrose 50% Injectable 25 Gram(s) IV Push once  dextrose 50% Injectable 12.5 Gram(s) IV Push once  dextrose 50% Injectable 25 Gram(s) IV Push once  enoxaparin Injectable 40 milliGRAM(s) SubCutaneous every 24 hours  glucagon  Injectable 1 milliGRAM(s) IntraMuscular once  insulin lispro (ADMELOG) corrective regimen sliding scale   SubCutaneous three times a day before meals  insulin lispro (ADMELOG) corrective regimen sliding scale   SubCutaneous at bedtime  levETIRAcetam 1000 milliGRAM(s) Oral two times a day  melatonin 5 milliGRAM(s) Oral at bedtime  methocarbamol 500 milliGRAM(s) Oral three times a day  metoprolol tartrate 25 milliGRAM(s) Oral two times a day  midodrine. 10 milliGRAM(s) Oral three times a day  rosuvastatin 5 milliGRAM(s) Oral at bedtime  sertraline 50 milliGRAM(s) Oral daily  spironolactone 50 milliGRAM(s) Oral daily    MEDICATIONS  (PRN):  dextrose Oral Gel 15 Gram(s) Oral once PRN Blood Glucose LESS THAN 70 milliGRAM(s)/deciliter  LORazepam   Injectable 2 milliGRAM(s) IV Push once PRN Seizure Activity  oxyCODONE    IR 5 milliGRAM(s) Oral every 6 hours PRN Severe Pain (7 - 10)      Vital Signs Last 24 Hrs  T(C): 36.6 (09 Mar 2025 11:07), Max: 36.7 (09 Mar 2025 08:50)  T(F): 97.8 (09 Mar 2025 11:07), Max: 98.1 (09 Mar 2025 08:50)  HR: 74 (09 Mar 2025 11:07) (74 - 100)  BP: 126/81 (09 Mar 2025 11:07) (102/66 - 163/96)  BP(mean): 96 (09 Mar 2025 05:34) (96 - 96)  RR: 19 (09 Mar 2025 11:07) (17 - 19)  SpO2: 99% (09 Mar 2025 11:07) (96% - 100%)    Parameters below as of 09 Mar 2025 11:07  Patient On (Oxygen Delivery Method): room air        Weight (kg): 48 (03-08-25 @ 14:33)    Physical Exam:    Constitutional: NAD, thin  Neck: trachea midline, no thyroid enlargement  Respiratory: CTAB, normal respirations  Cardiovascular: S1 and S2, RRR  Gastrointestinal: BS+, soft, ntnd  Extremities: No peripheral edema  Neurological: AOx3, no focal deficits  Psychiatric: Normal mood and normal affect  Skin: no rashes, no acanthosis    LABS  03-09    137  |  103  |  19.7  ----------------------------<  148[H]  3.8   |  21.0[L]  |  0.56    Ca    8.6      09 Mar 2025 04:54  Mg     1.9     03-08    TPro  7.9  /  Alb  4.3  /  TBili  0.3[L]  /  DBili  x   /  AST  21  /  ALT  21  /  AlkPhos  61  03-08                          10.0   10.90 )-----------( 258      ( 09 Mar 2025 04:54 )             32.1       A1C with Estimated Average Glucose Result: 7.0 % (03-09-25 @ 04:54)          Alanine Aminotransferase (ALT/SGPT): 21 U/L (03-08-25 @ 15:55)  Alkaline Phosphatase: 61 U/L (03-08-25 @ 15:55)  Albumin: 4.3 g/dL (03-08-25 @ 15:55)  Aspartate Aminotransferase (AST/SGOT): 21 U/L (03-08-25 @ 15:55)    Triiodothyronine, Total (T3 Total): 83 ng/dL (03-08-25 @ 15:55)  T4, Serum: 5.5 ug/dL (03-08-25 @ 15:55)  Free Thyroxine, Serum: 1.2 ng/dL (03-08-25 @ 15:55)  Thyroid Stimulating Hormone, Serum: 1.11 uIU/mL (03-08-25 @ 15:55)        CAPILLARY BLOOD GLUCOSE      POCT Blood Glucose.: 98 mg/dL (09 Mar 2025 08:42)  POCT Blood Glucose.: 101 mg/dL (08 Mar 2025 22:34)  POCT Blood Glucose.: 128 mg/dL (08 Mar 2025 14:26)      Imaging     Patient is a 34y old  Female who presents with a chief complaint of Breakthrough seizures (09 Mar 2025 09:31)    HPI:  34F w/ CHF, TIA, orthostatic hypotension, ketosis prone T2DM on omnipod/G7, recurrent seizures on AED presents to the ER for witnessed breakthrough seizures at home for which patient was on the commode with post ictal state. Admitted for seizures. Of note, patient missed about 1-2 weeks of keppra due to meds not being renewed and only restarted on keppra recently   Consult for diabetes mgmt, a1c 7    mom at bedside  She follows with endocrinologist Dr. Cinthya Vergara.   fhx of diabetes in mother, cousins  no fhx of thyroid issues  no smoking/etoh  lives at home   diagnosed with diabetes 2012    basal  12a 0.4  730a 0.5    icr 1:15 (but does not bolus all the time)  isf 1:50  target 120  threshold 160    of note, patient had a mcflurry and did not bolus and no hyperglycemia noted by the dexcom      PAST MEDICAL & SURGICAL HISTORY:  Diabetes mellitus    Migraines    Systolic CHF, chronic    Orthostatic hypotension    Seizure disorder    No significant past surgical history        Social History:  Pt denies hx of alcohol, tobacco or drug abuse (08 Mar 2025 20:23)      FAMILY HISTORY:  FHx: pancreatic cancer          Allergies    No Known Allergies    Intolerances        ROS as noted in the HPI    MEDICATIONS  (STANDING):  dextrose 5%. 1000 milliLiter(s) (50 mL/Hr) IV Continuous <Continuous>  dextrose 5%. 1000 milliLiter(s) (100 mL/Hr) IV Continuous <Continuous>  dextrose 50% Injectable 25 Gram(s) IV Push once  dextrose 50% Injectable 12.5 Gram(s) IV Push once  dextrose 50% Injectable 25 Gram(s) IV Push once  enoxaparin Injectable 40 milliGRAM(s) SubCutaneous every 24 hours  glucagon  Injectable 1 milliGRAM(s) IntraMuscular once  insulin lispro (ADMELOG) corrective regimen sliding scale   SubCutaneous three times a day before meals  insulin lispro (ADMELOG) corrective regimen sliding scale   SubCutaneous at bedtime  levETIRAcetam 1000 milliGRAM(s) Oral two times a day  melatonin 5 milliGRAM(s) Oral at bedtime  methocarbamol 500 milliGRAM(s) Oral three times a day  metoprolol tartrate 25 milliGRAM(s) Oral two times a day  midodrine. 10 milliGRAM(s) Oral three times a day  rosuvastatin 5 milliGRAM(s) Oral at bedtime  sertraline 50 milliGRAM(s) Oral daily  spironolactone 50 milliGRAM(s) Oral daily    MEDICATIONS  (PRN):  dextrose Oral Gel 15 Gram(s) Oral once PRN Blood Glucose LESS THAN 70 milliGRAM(s)/deciliter  LORazepam   Injectable 2 milliGRAM(s) IV Push once PRN Seizure Activity  oxyCODONE    IR 5 milliGRAM(s) Oral every 6 hours PRN Severe Pain (7 - 10)      Vital Signs Last 24 Hrs  T(C): 36.6 (09 Mar 2025 11:07), Max: 36.7 (09 Mar 2025 08:50)  T(F): 97.8 (09 Mar 2025 11:07), Max: 98.1 (09 Mar 2025 08:50)  HR: 74 (09 Mar 2025 11:07) (74 - 100)  BP: 126/81 (09 Mar 2025 11:07) (102/66 - 163/96)  BP(mean): 96 (09 Mar 2025 05:34) (96 - 96)  RR: 19 (09 Mar 2025 11:07) (17 - 19)  SpO2: 99% (09 Mar 2025 11:07) (96% - 100%)    Parameters below as of 09 Mar 2025 11:07  Patient On (Oxygen Delivery Method): room air        Weight (kg): 48 (03-08-25 @ 14:33)    Physical Exam:    Constitutional: NAD, thin  Neck: trachea midline, no thyroid enlargement  Respiratory: CTAB, normal respirations  Cardiovascular: S1 and S2, RRR  Gastrointestinal: BS+, soft, ntnd  Extremities: No peripheral edema  Neurological: AOx3, no focal deficits  Psychiatric: Normal mood and normal affect  Skin: no rashes, no acanthosis    LABS  03-09    137  |  103  |  19.7  ----------------------------<  148[H]  3.8   |  21.0[L]  |  0.56    Ca    8.6      09 Mar 2025 04:54  Mg     1.9     03-08    TPro  7.9  /  Alb  4.3  /  TBili  0.3[L]  /  DBili  x   /  AST  21  /  ALT  21  /  AlkPhos  61  03-08                          10.0   10.90 )-----------( 258      ( 09 Mar 2025 04:54 )             32.1       A1C with Estimated Average Glucose Result: 7.0 % (03-09-25 @ 04:54)          Alanine Aminotransferase (ALT/SGPT): 21 U/L (03-08-25 @ 15:55)  Alkaline Phosphatase: 61 U/L (03-08-25 @ 15:55)  Albumin: 4.3 g/dL (03-08-25 @ 15:55)  Aspartate Aminotransferase (AST/SGOT): 21 U/L (03-08-25 @ 15:55)    Triiodothyronine, Total (T3 Total): 83 ng/dL (03-08-25 @ 15:55)  T4, Serum: 5.5 ug/dL (03-08-25 @ 15:55)  Free Thyroxine, Serum: 1.2 ng/dL (03-08-25 @ 15:55)  Thyroid Stimulating Hormone, Serum: 1.11 uIU/mL (03-08-25 @ 15:55)        CAPILLARY BLOOD GLUCOSE      POCT Blood Glucose.: 98 mg/dL (09 Mar 2025 08:42)  POCT Blood Glucose.: 101 mg/dL (08 Mar 2025 22:34)  POCT Blood Glucose.: 128 mg/dL (08 Mar 2025 14:26)      Imaging

## 2025-03-09 NOTE — ADVANCED PRACTICE NURSE CONSULT - ASSESSMENT
went to see pt in am pt is a+ox3 co 0 pain. she states she has diabetes for 13 years . she fu w dr gramajo for endocrine. she has omni pod pump. she has humalog infusing. she has only 1 pod in use w her. she was advised to ask family to bring 3 more pods. pt verbalized understanding. went to see pt in am pt is a+ox3 co 0 pain. she states she has diabetes for 13 years . she fu w dr gramajo for endocrine. she has omni pod pump. she has humalog infusing. she has only 1 pod in use w her. she was advised to ask family to bring 3 more pods. pt verbalized understanding. pump setting 0000 -730 0.4 units/hr [id number] 0.5 units /hr insulin to carb ratio 1:15.

## 2025-03-09 NOTE — CONSULT NOTE ADULT - ASSESSMENT
The patient is a 34y Female with possible breakthrough seizure.     Seizure.   OK for EMU [facility] if available.   Will begin c-EEG.   Continue Keppra for now.     Diabetes Mellitus   Per medicine.     Case discussed with ER team (Dr Jama attending).

## 2025-03-09 NOTE — PHYSICAL THERAPY INITIAL EVALUATION ADULT - LEVEL OF INDEPENDENCE: BED TO CHAIR, REHAB EVAL
pt reporting feeling lightheaded and diaphoretic at EOB, worsening and pt returned to supine./unable to perform

## 2025-03-09 NOTE — PHYSICAL THERAPY INITIAL EVALUATION ADULT - ADDITIONAL COMMENTS
Pt reports she lives with her parents in a house with 1 SANDRA and no stairs inside. Pt required supervision/minimal assist for all functional mobility with a RW. Pt reports she was being assisted by her father who had taken time off of work, but is returning soon. Mother assists in evening, but also works. Pt owns a RW, shower chair, commode. Pt reports she lives with her parents in a house with 1 [address] and no stairs inside. Pt required supervision/minimal assist for all functional mobility with a RW. Pt reports she was being assisted by her father who had taken time off of work, but is returning soon. Mother assists in evening, but also works. Pt owns a RW, shower chair, commode. Reports ambulation distances : household.

## 2025-03-09 NOTE — PROGRESS NOTE ADULT - ASSESSMENT
35 y/o F PMHx CHF, TIA, DM type 1 on insulin pump presents to the ED for break through seizures  Patient states that since November she's had 6 hospitalizations where she was initial diagnosed with CHF with unclear etiology, TIA and new onset seizures started on Keppra, with increased ambulatory difficulty. Pt is being admitted for breakthrough Seizures.     Breakthrough seizure  - s/p Keppra 1000mg in ED  - continue Keppra 1000mg po BID  - CT head negative  - seizure precautions  -neuro Dr. Land consulted      Leukocytosis  - likely reactive with recent seizure episode  - wbc trending down  - afebrile   - repeat cbc in am     DM type 1 insulin dependent  - A1c level 7  - on insulin pump with sensor on right arm. Pt will self monitor during hospital stay  - CSI with meals  - diabetic diet  - endo Dr. Carpenter consulted     Chronic Systolic CHF  - continue spirolactone 50mg QD  - metoprolol 25mg bid   - clinically euvolemic at this time  - No prior  TTE on chart     orthostatic hypotension  - currently bp is soft,will resume. held last nigh as bp was high side      DVT ppx  - lovenox 40mg SQ QD     35 y/o F PMHx CHF, TIA, DM type 1 on insulin pump presents to the ED for break through seizures  Patient states that since November she's had 6 hospitalizations where she was initial diagnosed with CHF with unclear etiology, TIA and new onset seizures started on Keppra, with increased ambulatory difficulty. Pt is being admitted for breakthrough Seizures.     Breakthrough seizure  - s/p Keppra 1000mg in ED  - continue Keppra 1000mg po BID  -Veeg  - CT head negative  - seizure precautions  - seen by  Dr. Land      Leukocytosis  - likely reactive with recent seizure episode  - wbc trending down  - afebrile   - repeat cbc in am     DM type 1 insulin dependent  - A1c level 7  - on insulin pump with sensor on right arm. Pt will self monitor during hospital stay  - CSI with meals  - diabetic diet  - endo Dr. Carpenter consulted     Chronic Systolic CHF  - continue spirolactone 50mg QD  - metoprolol 25mg bid   - clinically euvolemic at this time  - No prior  TTE on chart     orthostatic hypotension  - currently bp is soft,will resume. 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

## 2025-03-09 NOTE — CONSULT NOTE ADULT - SUBJECTIVE AND OBJECTIVE BOX
[facility]                                        [facility]                                  [facility]                                      [address]                                           [place], [place], [address]                                                [phone]        CC: seizure     HISTORY:  The patient is a 34y Female who reports was diagnosed with seizures this past year. She was admitted to [facility] after an episode. She had EEG and was started on seizure medication although she reports EEG did not show anything specific.   Now presents after further episode of seizure activity.    PAST MEDICAL & SURGICAL HISTORY:  Diabetes mellitus  Migraines  Systolic CHF, chronic  Orthostatic hypotension  Seizure disorder  No significant past surgical history    MEDICATION PRIOR TO ADMISSION:  · 	senna oral tablet: Last Dose Taken:  , 2 tab(s) orally once a day (at bedtime)  · 	docusate sodium 100 mg oral capsule: Last Dose Taken:  , 1 cap(s) orally 2 times a day  · 	polyethylene glycol 3350 oral powder for reconstitution: Last Dose Taken:  , 17 gram(s) orally once  · 	Keppra 1000 mg oral tablet: Last Dose Taken:  , 1 tab(s) orally 2 times a day  · 	methocarbamol 500 mg oral tablet: Last Dose Taken:  , 1 tab(s) orally 3 times a day  · 	midodrine 10 mg oral tablet: 1 tab(s) orally 3 times a day as needed for orthostatic hypotension  · 	magnesium oxide: Last Dose Taken:  , 1 3 times a day  · 	potassium chloride: Last Dose Taken:  , 20 milliequivalent(s) once a day  · 	Aldactone 50 mg oral tablet: Last Dose Taken:  , 1 tab(s) orally once a day  · 	metoprolol tartrate 25 mg oral tablet: Last Dose Taken:  , 1 tab(s) orally 2 times a day  · 	Zoloft 50 mg oral tablet: Last Dose Taken:  , 1 tab(s) orally once a day  · 	Crestor 5 mg oral tablet: Last Dose Taken:  , 1 tab(s) orally once a day (at bedtime)    MEDICATIONS  (STANDING):  dextrose 5%. 1000 milliLiter(s) (50 mL/Hr) IV Continuous <Continuous>  dextrose 5%. 1000 milliLiter(s) (100 mL/Hr) IV Continuous <Continuous>  dextrose 50% Injectable 25 Gram(s) IV Push once  dextrose 50% Injectable 12.5 Gram(s) IV Push once  dextrose 50% Injectable 25 Gram(s) IV Push once  enoxaparin Injectable 40 milliGRAM(s) SubCutaneous every 24 hours  glucagon  Injectable 1 milliGRAM(s) IntraMuscular once  insulin lispro (ADMELOG) corrective regimen sliding scale   SubCutaneous three times a day before meals  insulin lispro (ADMELOG) corrective regimen sliding scale   SubCutaneous at bedtime  levETIRAcetam 1000 milliGRAM(s) Oral two times a day  melatonin 5 milliGRAM(s) Oral at bedtime  methocarbamol 500 milliGRAM(s) Oral three times a day  metoprolol tartrate 25 milliGRAM(s) Oral two times a day  midodrine. 10 milliGRAM(s) Oral three times a day  rosuvastatin 5 milliGRAM(s) Oral at bedtime  sertraline 50 milliGRAM(s) Oral daily  spironolactone 50 milliGRAM(s) Oral daily    MEDICATIONS  (PRN):  dextrose Oral Gel 15 Gram(s) Oral once PRN Blood Glucose LESS THAN 70 milliGRAM(s)/deciliter  LORazepam   Injectable 2 milliGRAM(s) IV Push once PRN Seizure Activity  oxyCODONE    IR 5 milliGRAM(s) Oral every 6 hours PRN Severe Pain (7 - 10)    Allergies  No Known Allergies    SOCIAL HISTORY:  Non smoker.     FAMILY HISTORY:  FHx: pancreatic cancer  No known family history of stroke or seizure.       ROS:  Constitutional: The patient denies fevers or weight changes.  Neuro: As per HPI.  Eyes: Denies blurry vision.  Ears/nose/throat: Denies Tinnitus.   Cardiac: Denies chest pain. Denies palpitations.  Respiratory: Denies shortness of breath.  GI: Denies abdominal pain, nausea, or vomiting.  : Denies change in urinary pattern.  Integumentary: Denies rash.  Psych: Denies recent mood changes.  Heme: denies easy bleeding/bruising.    Exam:  Vital Signs Last 24 Hrs  T(C): 36.7 (09 Mar 2025 08:50), Max: 36.7 (09 Mar 2025 08:50)  T(F): 98.1 (09 Mar 2025 08:50), Max: 98.1 (09 Mar 2025 08:50)  HR: 83 (09 Mar 2025 08:50) (82 - 100)  BP: 102/66 (09 Mar 2025 08:50) (102/66 - 163/96)  BP(mean): 96 (09 Mar 2025 05:34) (96 - 96)  RR: 19 (09 Mar 2025 08:50) (17 - 19)  SpO2: 96% (09 Mar 2025 08:50) (96% - 100%)    Parameters below as of 09 Mar 2025 08:50  Patient On (Oxygen Delivery Method): room air    General: NAD.   Carotid bruits absent.     Mental status: The patient is awake, alert, and fully oriented. There is no aphasia. Attention span is normal. Patient is aware of current events.     Cranial nerves: There is no papilledema. Pupils react symmetrically to light. There is no visual field deficit to confrontation. Extraocular motion is full with no nystagmus.  Facial sensation is intact. Facial musculature is symmetric. Palate elevates symmetrically. Tongue is midline.    Motor: There is normal bulk and tone.  Strength is 5/5 in the right arm and leg.   Strength is 5/5 in the left arm and leg.    Sensation: Intact to light touch and pin. There is no extinction to double simultaneous stimulation.    Reflexes: 2+ throughout and plantar responses are flexor.    Cerebellar: There is no dysmetria on finger to nose testing.    LABS:                         10.0   10.90 )-----------( 258      ( 09 Mar 2025 04:54 )             32.1       03-09    137  |  103  |  19.7  ----------------------------<  148[H]  3.8   |  21.0[L]  |  0.56    Ca    8.6      09 Mar 2025 04:54  Mg     1.9     03-08    TPro  7.9  /  Alb  4.3  /  TBili  0.3[L]  /  DBili  x   /  AST  21  /  ALT  21  /  AlkPhos  61  03-08    RADIOLOGY   CT head images reviewed (and concur with report): There is no acute pathology.

## 2025-03-09 NOTE — CONSULT NOTE ADULT - ASSESSMENT
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 Admitted for seizures  Consult for diabetes mgmt, a1c 7      T2DM, ketosis prone- well controlled currently  -A1c 7  -check sugars AC and bedtime  -ensure diabetic diet  -never hold basal insulin/lantus, otherwise DKA can result  -continue insulin pump   -patient has pump supplies with her  -check kevin/islet cell/islet cell 2 Abs and cpeptide  -if patient is off insulin pump for any reason, start lantus 8 units and admelog 2 units TID with LOW dose sliding scale      Seizures, TIA- seen by neuro, care per primary team    CHF- on spironolactone, care per primary team 34F w/ CHF, TIA, orthostatic hypotension, ketosis prone T2DM on omnipod/G7, recurrent seizures on AED presents to the ER for witnessed breakthrough seizures at home for which patient was on the commode with post ictal state. Admitted for seizures. Of note, patient missed about 1-2 weeks of keppra due to meds not being renewed and only restarted on keppra recently   Consult for diabetes mgmt, a1c 7      T2DM, ketosis prone- well controlled currently  -A1c 7  -check sugars AC and bedtime  -ensure diabetic diet  -never hold basal insulin/lantus, otherwise DKA can result  -continue insulin pump. pump order in place  -patient has pump supplies with her, due to change the pump today  -check kevin/islet cell/islet cell 2 Abs and cpeptide  -if patient is off insulin pump for any reason, start lantus 4 units with LOW dose sliding scale    Seizures, TIA- on EEG, seen by neuro, care per primary team    CHF- on spironolactone, care per primary team

## 2025-03-10 ENCOUNTER — RESULT REVIEW (OUTPATIENT)
Age: 35
End: 2025-03-10

## 2025-03-10 LAB
ANION GAP SERPL CALC-SCNC: 12 MMOL/L — SIGNIFICANT CHANGE UP (ref 5–17)
APPEARANCE UR: ABNORMAL
BACTERIA # UR AUTO: ABNORMAL /HPF
BILIRUB UR-MCNC: NEGATIVE — SIGNIFICANT CHANGE UP
BUN SERPL-MCNC: 9.1 MG/DL — SIGNIFICANT CHANGE UP (ref 8–20)
C PEPTIDE SERPL-MCNC: 0.8 NG/ML — LOW (ref 1.1–4.4)
CALCIUM SERPL-MCNC: 8.9 MG/DL — SIGNIFICANT CHANGE UP (ref 8.4–10.5)
CAST: 0 /LPF — SIGNIFICANT CHANGE UP (ref 0–4)
CHLORIDE SERPL-SCNC: 104 MMOL/L — SIGNIFICANT CHANGE UP (ref 96–108)
CO2 SERPL-SCNC: 23 MMOL/L — SIGNIFICANT CHANGE UP (ref 22–29)
COLOR SPEC: YELLOW — SIGNIFICANT CHANGE UP
CREAT SERPL-MCNC: 0.43 MG/DL — LOW (ref 0.5–1.3)
DIFF PNL FLD: ABNORMAL
EGFR: 131 ML/MIN/1.73M2 — SIGNIFICANT CHANGE UP
EGFR: 131 ML/MIN/1.73M2 — SIGNIFICANT CHANGE UP
GLUCOSE BLDC GLUCOMTR-MCNC: 107 MG/DL — HIGH (ref 70–99)
GLUCOSE BLDC GLUCOMTR-MCNC: 107 MG/DL — HIGH (ref 70–99)
GLUCOSE BLDC GLUCOMTR-MCNC: 118 MG/DL — HIGH (ref 70–99)
GLUCOSE BLDC GLUCOMTR-MCNC: 140 MG/DL — HIGH (ref 70–99)
GLUCOSE SERPL-MCNC: 108 MG/DL — HIGH (ref 70–99)
GLUCOSE UR QL: NEGATIVE MG/DL — SIGNIFICANT CHANGE UP
HCT VFR BLD CALC: 32.7 % — LOW (ref 34.5–45)
HGB BLD-MCNC: 10.5 G/DL — LOW (ref 11.5–15.5)
KETONES UR-MCNC: ABNORMAL MG/DL
LEUKOCYTE ESTERASE UR-ACNC: ABNORMAL
MCHC RBC-ENTMCNC: 28.4 PG — SIGNIFICANT CHANGE UP (ref 27–34)
MCHC RBC-ENTMCNC: 32.1 G/DL — SIGNIFICANT CHANGE UP (ref 32–36)
MCV RBC AUTO: 88.4 FL — SIGNIFICANT CHANGE UP (ref 80–100)
NITRITE UR-MCNC: NEGATIVE — SIGNIFICANT CHANGE UP
NRBC # BLD AUTO: 0 K/UL — SIGNIFICANT CHANGE UP (ref 0–0)
NRBC # FLD: 0 K/UL — SIGNIFICANT CHANGE UP (ref 0–0)
NRBC BLD AUTO-RTO: 0 /100 WBCS — SIGNIFICANT CHANGE UP (ref 0–0)
PH UR: 6 — SIGNIFICANT CHANGE UP (ref 5–8)
PLATELET # BLD AUTO: 246 K/UL — SIGNIFICANT CHANGE UP (ref 150–400)
PMV BLD: 11.6 FL — SIGNIFICANT CHANGE UP (ref 7–13)
POTASSIUM SERPL-MCNC: 4.2 MMOL/L — SIGNIFICANT CHANGE UP (ref 3.5–5.3)
POTASSIUM SERPL-SCNC: 4.2 MMOL/L — SIGNIFICANT CHANGE UP (ref 3.5–5.3)
PROT UR-MCNC: 100 MG/DL
RBC # BLD: 3.7 M/UL — LOW (ref 3.8–5.2)
RBC # FLD: 11.9 % — SIGNIFICANT CHANGE UP (ref 10.3–14.5)
RBC CASTS # UR COMP ASSIST: 113 /HPF — HIGH (ref 0–4)
SODIUM SERPL-SCNC: 139 MMOL/L — SIGNIFICANT CHANGE UP (ref 135–145)
SP GR SPEC: 1.01 — SIGNIFICANT CHANGE UP (ref 1–1.03)
SQUAMOUS # UR AUTO: 0 /HPF — SIGNIFICANT CHANGE UP (ref 0–5)
UROBILINOGEN FLD QL: 0.2 MG/DL — SIGNIFICANT CHANGE UP (ref 0.2–1)
WBC # BLD: 10.43 K/UL — SIGNIFICANT CHANGE UP (ref 3.8–10.5)
WBC # FLD AUTO: 10.43 K/UL — SIGNIFICANT CHANGE UP (ref 3.8–10.5)
WBC UR QL: 17 /HPF — HIGH (ref 0–5)

## 2025-03-10 PROCEDURE — 99232 SBSQ HOSP IP/OBS MODERATE 35: CPT

## 2025-03-10 PROCEDURE — 93356 MYOCRD STRAIN IMG SPCKL TRCK: CPT

## 2025-03-10 PROCEDURE — 93306 TTE W/DOPPLER COMPLETE: CPT | Mod: 26

## 2025-03-10 PROCEDURE — 93880 EXTRACRANIAL BILAT STUDY: CPT | Mod: 26

## 2025-03-10 PROCEDURE — 99284 EMERGENCY DEPT VISIT MOD MDM: CPT

## 2025-03-10 PROCEDURE — 70450 CT HEAD/BRAIN W/O DYE: CPT | Mod: 26

## 2025-03-10 PROCEDURE — 95720 EEG PHY/QHP EA INCR W/VEEG: CPT

## 2025-03-10 PROCEDURE — 99233 SBSQ HOSP IP/OBS HIGH 50: CPT

## 2025-03-10 RX ORDER — MELATONIN 5 MG
5 TABLET ORAL ONCE
Refills: 0 | Status: COMPLETED | OUTPATIENT
Start: 2025-03-10 | End: 2025-03-10

## 2025-03-10 RX ORDER — B1/B2/B3/B5/B6/B12/VIT C/FOLIC 500-0.5 MG
1 TABLET ORAL DAILY
Refills: 0 | Status: DISCONTINUED | OUTPATIENT
Start: 2025-03-10 | End: 2025-03-24

## 2025-03-10 RX ORDER — CEFTRIAXONE 500 MG/1
1000 INJECTION, POWDER, FOR SOLUTION INTRAMUSCULAR; INTRAVENOUS EVERY 24 HOURS
Refills: 0 | Status: COMPLETED | OUTPATIENT
Start: 2025-03-11 | End: 2025-03-13

## 2025-03-10 RX ORDER — MIDODRINE HYDROCHLORIDE 5 MG/1
10 TABLET ORAL ONCE
Refills: 0 | Status: COMPLETED | OUTPATIENT
Start: 2025-03-10 | End: 2025-03-10

## 2025-03-10 RX ORDER — CEFTRIAXONE 500 MG/1
1000 INJECTION, POWDER, FOR SOLUTION INTRAMUSCULAR; INTRAVENOUS ONCE
Refills: 0 | Status: COMPLETED | OUTPATIENT
Start: 2025-03-10 | End: 2025-03-10

## 2025-03-10 RX ORDER — CEFTRIAXONE 500 MG/1
INJECTION, POWDER, FOR SOLUTION INTRAMUSCULAR; INTRAVENOUS
Refills: 0 | Status: COMPLETED | OUTPATIENT
Start: 2025-03-10 | End: 2025-03-14

## 2025-03-10 RX ORDER — ACETAMINOPHEN 500 MG/5ML
725 LIQUID (ML) ORAL ONCE
Refills: 0 | Status: COMPLETED | OUTPATIENT
Start: 2025-03-10 | End: 2025-03-10

## 2025-03-10 RX ADMIN — Medication 290 MILLIGRAM(S): at 23:27

## 2025-03-10 RX ADMIN — Medication 50 MILLIGRAM(S): at 09:11

## 2025-03-10 RX ADMIN — CEFTRIAXONE 1000 MILLIGRAM(S): 500 INJECTION, POWDER, FOR SOLUTION INTRAMUSCULAR; INTRAVENOUS at 20:03

## 2025-03-10 RX ADMIN — ENOXAPARIN SODIUM 40 MILLIGRAM(S): 100 INJECTION SUBCUTANEOUS at 22:56

## 2025-03-10 RX ADMIN — SERTRALINE 50 MILLIGRAM(S): 100 TABLET, FILM COATED ORAL at 12:20

## 2025-03-10 RX ADMIN — MIDODRINE HYDROCHLORIDE 10 MILLIGRAM(S): 5 TABLET ORAL at 05:40

## 2025-03-10 RX ADMIN — ENOXAPARIN SODIUM 40 MILLIGRAM(S): 100 INJECTION SUBCUTANEOUS at 01:03

## 2025-03-10 RX ADMIN — METHOCARBAMOL 500 MILLIGRAM(S): 500 TABLET, FILM COATED ORAL at 15:23

## 2025-03-10 RX ADMIN — LEVETIRACETAM 1000 MILLIGRAM(S): 10 INJECTION, SOLUTION INTRAVENOUS at 05:41

## 2025-03-10 RX ADMIN — Medication 1 TABLET(S): at 12:19

## 2025-03-10 RX ADMIN — ROSUVASTATIN CALCIUM 5 MILLIGRAM(S): 5 TABLET, FILM COATED ORAL at 22:56

## 2025-03-10 RX ADMIN — MIDODRINE HYDROCHLORIDE 10 MILLIGRAM(S): 5 TABLET ORAL at 22:56

## 2025-03-10 RX ADMIN — Medication 5 MILLIGRAM(S): at 01:04

## 2025-03-10 RX ADMIN — MIDODRINE HYDROCHLORIDE 10 MILLIGRAM(S): 5 TABLET ORAL at 12:19

## 2025-03-10 RX ADMIN — OXYCODONE HYDROCHLORIDE 5 MILLIGRAM(S): 30 TABLET ORAL at 05:41

## 2025-03-10 RX ADMIN — Medication 25 MILLIGRAM(S): at 18:59

## 2025-03-10 RX ADMIN — LEVETIRACETAM 1000 MILLIGRAM(S): 10 INJECTION, SOLUTION INTRAVENOUS at 17:24

## 2025-03-10 RX ADMIN — METOPROLOL SUCCINATE 25 MILLIGRAM(S): 50 TABLET, EXTENDED RELEASE ORAL at 09:11

## 2025-03-10 RX ADMIN — Medication 5 MILLIGRAM(S): at 22:56

## 2025-03-10 RX ADMIN — METHOCARBAMOL 500 MILLIGRAM(S): 500 TABLET, FILM COATED ORAL at 05:41

## 2025-03-10 NOTE — DIETITIAN INITIAL EVALUATION ADULT - PERTINENT LABORATORY DATA
03-09    137  |  103  |  19.7  ----------------------------<  148[H]  3.8   |  21.0[L]  |  0.56    Ca    8.6      09 Mar 2025 04:54  Mg     1.9     03-08    TPro  7.9  /  Alb  4.3  /  TBili  0.3[L]  /  DBili  x   /  AST  21  /  ALT  21  /  AlkPhos  61  03-08  POCT Blood Glucose.: 118 mg/dL (03-10-25 @ 08:19)  A1C with Estimated Average Glucose Result: 7.0 % (03-09-25 @ 04:54)  A1C with Estimated Average Glucose Result: 16.2 % (06-20-24 @ 09:50)

## 2025-03-10 NOTE — PROGRESS NOTE ADULT - SUBJECTIVE AND OBJECTIVE BOX
INTERVAL EVENTS:  Follow up diabetes management. On insulin pump, glucoses are stable. Not eating much due to discomfort of back pain.    MEDICATIONS  (STANDING):  acetaminophen   IVPB .. 725 milliGRAM(s) IV Intermittent once  dextrose 5%. 1000 milliLiter(s) (100 mL/Hr) IV Continuous <Continuous>  dextrose 5%. 1000 milliLiter(s) (50 mL/Hr) IV Continuous <Continuous>  dextrose 50% Injectable 25 Gram(s) IV Push once  dextrose 50% Injectable 12.5 Gram(s) IV Push once  dextrose 50% Injectable 25 Gram(s) IV Push once  enoxaparin Injectable 40 milliGRAM(s) SubCutaneous every 24 hours  glucagon  Injectable 1 milliGRAM(s) IntraMuscular once  insulin lispro (HumaLOG) Pump 1 Each SubCutaneous Continuous Pump  levETIRAcetam 1000 milliGRAM(s) Oral two times a day  melatonin 5 milliGRAM(s) Oral at bedtime  methocarbamol 500 milliGRAM(s) Oral three times a day  metoprolol tartrate 25 milliGRAM(s) Oral two times a day  midodrine. 10 milliGRAM(s) Oral three times a day  multivitamin 1 Tablet(s) Oral daily  rosuvastatin 5 milliGRAM(s) Oral at bedtime  sertraline 50 milliGRAM(s) Oral daily  spironolactone 50 milliGRAM(s) Oral daily    MEDICATIONS  (PRN):  dextrose Oral Gel 15 Gram(s) Oral once PRN Blood Glucose LESS THAN 70 milliGRAM(s)/deciliter  LORazepam   Injectable 2 milliGRAM(s) IV Push once PRN Seizure Activity  oxycodone    5 mG/acetaminophen 325 mG 1 Tablet(s) Oral every 6 hours PRN Severe Pain (7 - 10)  oxyCODONE    IR 5 milliGRAM(s) Oral every 6 hours PRN Severe Pain (7 - 10)    Allergies  No Known Allergies    Vital Signs Last 24 Hrs  T(C): 36.8 (10 Mar 2025 07:48), Max: 36.9 (10 Mar 2025 01:00)  T(F): 98.2 (10 Mar 2025 07:48), Max: 98.4 (10 Mar 2025 01:00)  HR: 96 (10 Mar 2025 09:07) (77 - 100)  BP: 148/95 (10 Mar 2025 09:07) (90/62 - 180/100)  BP(mean): 86 (09 Mar 2025 19:34) (86 - 101)  RR: 18 (10 Mar 2025 07:48) (18 - 20)  SpO2: 100% (10 Mar 2025 07:48) (95% - 100%)    Parameters below as of 10 Mar 2025 07:48  Patient On (Oxygen Delivery Method): room air    PHYSICAL EXAM:  General: No apparent distress  Respiratory: Lungs clear bilaterally  Cardiac: +S1, S2, no m/r/g  GI: +BS, soft, non tender, non distended  Extremities: No peripheral edema  Neuro: A+O X3    LABS:                        10.5   10.43 )-----------( 246      ( 10 Mar 2025 08:48 )             32.7     03-10    139  |  104  |  9.1  ----------------------------<  108[H]  4.2   |  23.0  |  0.43[L]    Ca    8.9      10 Mar 2025 08:48  Mg     1.9     03-08    TPro  7.9  /  Alb  4.3  /  TBili  0.3[L]  /  DBili  x   /  AST  21  /  ALT  21  /  AlkPhos  61  03-08    Urinalysis Basic - ( 10 Mar 2025 08:48 )    Color: x / Appearance: x / SG: x / pH: x  Gluc: 108 mg/dL / Ketone: x  / Bili: x / Urobili: x   Blood: x / Protein: x / Nitrite: x   Leuk Esterase: x / RBC: x / WBC x   Sq Epi: x / Non Sq Epi: x / Bacteria: x    POCT Blood Glucose.: 118 mg/dL (03-10-25 @ 08:19)  POCT Blood Glucose.: 140 mg/dL (03-09-25 @ 21:14)  POCT Blood Glucose.: 164 mg/dL (03-09-25 @ 16:44)  POCT Blood Glucose.: 138 mg/dL (03-09-25 @ 11:29)    Triiodothyronine, Total (T3 Total): 83 ng/dL (03-08-25 @ 15:55)  Free Thyroxine, Serum: 1.2 ng/dL (03-08-25 @ 15:55)  Thyroid Stimulating Hormone, Serum: 1.11 uIU/mL (03-08-25 @ 15:55)

## 2025-03-10 NOTE — DIETITIAN INITIAL EVALUATION ADULT - PERTINENT MEDS FT
MEDICATIONS  (STANDING):  acetaminophen   IVPB .. 725 milliGRAM(s) IV Intermittent once  enoxaparin Injectable 40 milliGRAM(s) SubCutaneous every 24 hours  glucagon  Injectable 1 milliGRAM(s) IntraMuscular once  insulin lispro (HumaLOG) Pump 1 Each SubCutaneous Continuous Pump  levETIRAcetam 1000 milliGRAM(s) Oral two times a day  methocarbamol 500 milliGRAM(s) Oral three times a day  metoprolol tartrate 25 milliGRAM(s) Oral two times a day  midodrine. 10 milliGRAM(s) Oral three times a day  rosuvastatin 5 milliGRAM(s) Oral at bedtime  sertraline 50 milliGRAM(s) Oral daily  spironolactone 50 milliGRAM(s) Oral daily

## 2025-03-10 NOTE — CHART NOTE - NSCHARTNOTEFT_GEN_A_CORE
Notified by RN pt received to unit and upon VS check BP 80/62. All other VSS.  Patient is A&O X4, she is asymptomatic. No s/s bleeding.  Patient is as 33 y/o F PMH CHF, TIA, DMI on insulin pump admitted for breakthrough seizures. Pt with h/o orthostatic hypotension, on midodrine TID. Cardiology onboard.   Patient last received midodrine 10mg dose @12:20pm, evening dose held due to BP parameters. Pt received Hydralazine 25mg PO dose administered at approximately 19:00, oxycodone Notified by RN pt received to unit and upon VS check BP 80/62. All other VSS, pt afebrile.  Patient is A&O X4, she is asymptomatic. No s/s bleeding.  Patient is as 33 y/o F PMH CHF, TIA, DMI on insulin pump admitted for breakthrough seizures. Pt with h/o orthostatic hypotension, on midodrine TID. Cardiology onboard.   Patient last received midodrine 10mg dose @12:20pm, evening dose held due to BP hold parameters. Pt received Percocet @17:24, Hydralazine 25mg PO dose administered at approximately 19:00.  - Midodrine 10mg PO X1 dose STAT. Repeat BP pending.   - Pt A&O X4, asymptomatic. MAP 68mmHg. NAD. RN instructed to continue to monitor pt and notify provider of any changes in pt status.  - Hold percocet at this time pending improvement in BP. Tylenol PRN for pain.  - PMD will be notified in AM.

## 2025-03-10 NOTE — CONSULT NOTE ADULT - ATTENDING COMMENTS
I edited the note.     35 y/o F PMHx CHF, TIA, chronic back pain, DM type 1 on insulin pump presents to the ED for break through seizures that occurred at home.     Patient was diagnosed with type 1 DM at 20 years of age, she stated that she did not control it well, had A1c levels 15-16 until she was placed on an insulin pump in her early 30s. Patient states that since November 2024 she has had 6 hospitalizations where she was initial diagnosed with CHF with unclear etiology back in November of last year also had a [facility] without signs of CAD. Patient follows with a cardiologist at [facility]. Also, TIA and new onset seizures starting in 1/2025, patient was started on Keppra, with increased ambulatory difficulty. Patient also endorses worsening dizziness/lethargy over the past few months, takes midodrine 5 mg or 10 mg twice per day depending on symptoms. Patient also has back pain, chronic, began 2-3 yrs ago, no trauma, had neurologic workup and found spondylolisthesis of lumbar spine and arthritis of c-spine. Endorses taking 5mg oxycodone and 600mg Tylenol every 4 hrs.     On this visit, per Pt, she was trying to go to bathroom where she had an episode of lightheadedness, had to be placed on a commode, had a seizure lasting 2-3 min comprised of upper extremity shaking with a blank stare, w/ post-ictal state. Then a few minutes later went into another seizure which lasted 4-5 min with similar presentation and post-ictal state. Currently patient endorses lethargy worse with ambulation, dizziness when sitting/standing, unable to eat as she cannot sit up due to severe dizziness. Currently denies fever, chills, sick contacts, headache, N/V/D/C, chest pain, chest tightness, abdominal pain. Pt was admitted for breakthrough Seizures. Cardiology consulted for HFrEF and cardiogenic syncope.         The severe orthostatic hypotension may be due to cardiac autonomic neuropathy. She has many years of uncontrolled diabetes mellitus possibly leading to efferent sympathetic vasomotor denervation, causing reduced vasoconstriction of the splanchnic and other peripheral vascular beds leading to profound orthostatic hypotension.  These symptoms can be aggravated by a number of drugs including: vasodilators, diuretics, phenothiazines, even insulin (through endothelium-dependent vasodilatation), and tricyclic antidepressants, a class of drugs commonly used for symptomatic relief of pain associated with painful diabetic neuropathy.    The mildly reduced LVEF needs further workup. In a patient with a normal coronary anatomy, CMR is the next best step in management to assess for infiltrative process. This should be performed prior to discharge.     At this time, the better part of valor is to hold GDMT (she is on MRA), monitor BP, perform daily orthostatic vitals, reassess symptoms, wear leg compression stockings to the waist and abdominal binder. Continue tele. BB should also be held at this time.     We will follow.

## 2025-03-10 NOTE — DIETITIAN INITIAL EVALUATION ADULT - PHYSICAL ASSESSMENT CLAVICLES
Quality 110: Preventive Care And Screening: Influenza Immunization: Influenza Immunization Administered during Influenza season Quality 130: Documentation Of Current Medications In The Medical Record: Current Medications Documented moderate Quality 474: Zoster Vaccination Status: Shingrix Vaccination Administered or Previously Received Quality 111:Pneumonia Vaccination Status For Older Adults: Pneumococcal Vaccination Previously Received Detail Level: Detailed

## 2025-03-10 NOTE — EEG REPORT - NS EEG TEXT BOX
-- DAY 1 	   -- START: 3/9/2025  10:43      -- END: 	3/10/2025 08:00   -- DURATION (HRS): ?20 hr 28 min       DAILY EEG VISUAL ANALYSIS   The background was continuous, symmetric, spontaneously variable and reactive. During wakefulness, the posterior dominant rhythm consisted of symmetric, well-modulated 10 Hz activity, with amplitude to 40 uV, that attenuated to eye opening.  Low amplitude frontal beta was noted in wakefulness.    The anterior to posterior gradient was present.        GENERALIZED SLOWING:   No generalized background slowing was present.   Diffuse theta and delta frequencies were present.     FOCAL SLOWING:    None was present.     SLEEP BACKGROUND:   Drowsiness was characterized by fragmentation, attenuation, and slowing of the background activity.     Sleep was characterized by the presence of vertex waves, symmetric sleep spindles and K-complexes.          OTHER NON-EPILEPTIFORM FINDINGS:     None were present.     ACTIVATION PROCEDURES:      Hyperventilation was not performed.       Photic stimulation was not performed.          INTERICTAL EPILEPTIFORM ACTIVITY:    None were present.     EVENTS:   No events or seizures recorded.       ARTIFACTS:   Intermittent myogenic and movement artifacts were noted.     ECG:   The heart rate on single channel ECG was predominantly between 60-80 BPM.     ASMs:    Levetiracetam 1000 mg q12 po     -----------------------------------------------------------------------------------------------------------     EEG CLASSIFICATION:   Normal EEG in the awake, drowsy and asleep states.     -----------------------------------------------------------------------------------------------------------     IMPRESSION/CLINICAL CORRELATE:     This is a normal VEEG. No epileptiform pattern or seizures were recorded     -----------------------------------------------------------------------------------------------------------     Flo Wiley DO   Fellow, [facility]          Royal Warner MD, PhD   Director, Epilepsy Division, [facility]       -- DAY 1 	     -- START: 3/9/2025  10:43        -- END: 	3/10/2025 08:00     -- DURATION (HRS): ?20 hr 28 min          DAILY EEG VISUAL ANALYSIS     The background was continuous, symmetric, spontaneously variable. During wakefulness, the posterior dominant rhythm consisted of symmetric, well-modulated 9.5-Hz activity that attenuated to eye opening. Low-amplitude frontal beta present. Rare diffuse polymorphic theta.          GENERALIZED SLOWING:     None          FOCAL SLOWING:      None          SLEEP BACKGROUND:     Drowsiness was characterized by fragmentation, attenuation, and slowing of the background activity.       Stage 2 sleep was characterized by symmetric sleep spindles and K-complexes.          OTHER NON-EPILEPTIFORM FINDINGS:     None          ACTIVATION PROCEDURES:      Hyperventilation was attempted (patient was drowsy at this time) and did not elicit any abnormalities.      Photic stimulation was performed and did not elicit any abnormalities. There was symmetric photic driving at multiple flash frequencies.          INTERICTAL EPILEPTIFORM ACTIVITY:      None          EVENTS:     No events or seizures recorded.          ARTIFACTS:     Intermittent myogenic and movement artifacts.          SINGLE-LEAD EKG:     Regular rhythm          ASMs:      Levetiracetam 1000 mg q12 po     -----------------------------------------------------------------------------------------------------------     EEG CLASSIFICATION:     Normal EEG in the awake, drowsy and asleep states.     No focal or epileptiform abnormalities.     No seizures.          -----------------------------------------------------------------------------------------------------------     IMPRESSION/CLINICAL CORRELATE:     Normal video-EEG.     No epileptiform abnormalities or seizures were recorded.          -----------------------------------------------------------------------------------------------------------     DO Hood Otero, [facility] [place]          Jacqueline Leon MD     Attending Physician, [facility]          ------------------------------------     EEG Reading Room: [phone]     On Call Service After Hours: [phone]

## 2025-03-10 NOTE — PROGRESS NOTE ADULT - SUBJECTIVE AND OBJECTIVE BOX
[facility]                                        Ne[facility] [place]                                  [facility]                                      [address]                                           [place], [place], [address]                                                [phone]        CC: seizure     HISTORY:  The patient is a 34y Female who reports was diagnosed with seizures this past year. She was admitted to [facility] after an episode. She had EEG and was started on seizure medication although she reports EEG did not show anything specific.   Now presents after further episode of seizure activity.    Interval history: episode of syncope     Review of systems (neurology): Denies headache or dizziness. Denies weakness/numbness.  Denies speech/language deficits. Denies diplopia/blurred vision.  Denies confusion    MEDICATIONS  (STANDING):  acetaminophen   IVPB .. 725 milliGRAM(s) IV Intermittent once  dextrose 5%. 1000 milliLiter(s) (100 mL/Hr) IV Continuous <Continuous>  dextrose 5%. 1000 milliLiter(s) (50 mL/Hr) IV Continuous <Continuous>  dextrose 50% Injectable 25 Gram(s) IV Push once  dextrose 50% Injectable 12.5 Gram(s) IV Push once  dextrose 50% Injectable 25 Gram(s) IV Push once  enoxaparin Injectable 40 milliGRAM(s) SubCutaneous every 24 hours  glucagon  Injectable 1 milliGRAM(s) IntraMuscular once  insulin lispro (HumaLOG) Pump 1 Each SubCutaneous Continuous Pump  levETIRAcetam 1000 milliGRAM(s) Oral two times a day  melatonin 5 milliGRAM(s) Oral at bedtime  methocarbamol 500 milliGRAM(s) Oral three times a day  metoprolol tartrate 25 milliGRAM(s) Oral two times a day  midodrine. 10 milliGRAM(s) Oral three times a day  multivitamin 1 Tablet(s) Oral daily  rosuvastatin 5 milliGRAM(s) Oral at bedtime  sertraline 50 milliGRAM(s) Oral daily  spironolactone 50 milliGRAM(s) Oral daily    MEDICATIONS  (PRN):  dextrose Oral Gel 15 Gram(s) Oral once PRN Blood Glucose LESS THAN 70 milliGRAM(s)/deciliter  LORazepam   Injectable 2 milliGRAM(s) IV Push once PRN Seizure Activity  oxycodone    5 mG/acetaminophen 325 mG 1 Tablet(s) Oral every 6 hours PRN Severe Pain (7 - 10)  oxyCODONE    IR 5 milliGRAM(s) Oral every 6 hours PRN Severe Pain (7 - 10)      Vital Signs Last 24 Hrs  T(C): 36.7 (10 Mar 2025 15:14), Max: 36.9 (10 Mar 2025 01:00)  T(F): 98 (10 Mar 2025 15:14), Max: 98.4 (10 Mar 2025 01:00)  HR: 82 (10 Mar 2025 15:14) (77 - 100)  BP: 160/92 (10 Mar 2025 15:14) (90/62 - 180/100)  BP(mean): 86 (09 Mar 2025 19:34) (86 - 101)  RR: 18 (10 Mar 2025 15:14) (18 - 20)  SpO2: 100% (10 Mar 2025 15:14) (95% - 100%)    Parameters below as of 10 Mar 2025 15:14  Patient On (Oxygen Delivery Method): nasal cannula  O2 Flow (L/min): 2  Neuro exam:    Mental status: The patient is awake, alert, and fully oriented. There is no aphasia. Attention span is normal. Patient is aware of current events.     Cranial nerves:Pupils react symmetrically to light. There is no visual field deficit to confrontation. Extraocular motion is full with no nystagmus.  Facial sensation is intact. Facial musculature is symmetric. Palate elevates symmetrically. Tongue is midline.    Motor: There is normal bulk and tone.  Strength is 5/5 in the right arm and leg.   Strength is 5/5 in the left arm and leg.    Sensation: Intact to light touch and pin.     Cerebellar: There is no dysmetria on finger to nose testing.    LABS:                         10.0   10.90 )-----------( 258      ( 09 Mar 2025 04:54 )             32.1       03-09    137  |  103  |  19.7  ----------------------------<  148[H]  3.8   |  21.0[L]  |  0.56    Ca    8.6      09 Mar 2025 04:54  Mg     1.9     03-08    TPro  7.9  /  Alb  4.3  /  TBili  0.3[L]  /  DBili  x   /  AST  21  /  ALT  21  /  AlkPhos  61  03-08    EEG CLASSIFICATION 3/9-3/10/25:   Normal EEG in the awake, drowsy and asleep states.   No focal or epileptiform abnormalities.   No seizures.   -----------------------------------------------------------------------------------------------------------   IMPRESSION/CLINICAL CORRELATE:   Normal video-EEG.   No epileptiform abnormalities or seizures were recorded.     RADIOLOGY   CT Head No Cont (03.08.25 @ 16:31)   IMPRESSION:  No acute intracranial bleeding.  Enlarged nasopharyngeal soft tissue, greater than expected for enlarged   adenoids for patient's age. Consider direct inspection.

## 2025-03-10 NOTE — CHART NOTE - NSCHARTNOTEFT_GEN_A_CORE
Chart/Event Note  [facility] ESS 16R  HOWARD SMITH, 34y, Female  [id number]    Notified by RN that the above patient went to use the commode, had a syncopal event and was lowered to the floor.     Events/History of Present Illness  35 y/o F PMHx CHF, TIA, DM type 1 on insulin pump presented to the ED for break through seizures  Since November she's had 6 hospitalizations where she was initial diagnosed with CHF with unclear etiology, TIA and new onset seizures started on Keppra, with increased ambulatory difficulty.   Now, Patient has no complaints, NAD, VSS. Patient states that she attempted to get up to use the commode when she began to pass out, and she was lowered to the floor by hospital staff. No injuries were sustained. Per patient, she has a history of orthostatic hypotension and she frequently "passes out when standing".       Review of Systems:  Constitutional: No chills, or fatigue.  Neurologic: No headache, dizziness, vision/speech changes, numbness, or weakness.  Respiratory: No cough, wheezing, shortness of breath, or dyspnea.   Cardiovascular: No chest pain, pressure, or palpitations.   GI: No abdominal pain, nausea, vomiting, diarrhea, constipation.   : No dysuria, burning, frequency, incontinence, or retention.     T(C): 36.4 (03-09-25 @ 23:51), Max: 36.7 (03-09-25 @ 08:50)  HR: 88 (03-09-25 @ 23:51) (74 - 100)  BP: 134/91 (03-09-25 @ 23:51) (102/66 - 180/100)  RR: 19 (03-09-25 @ 23:51) (19 - 20)  SpO2: 98% (03-09-25 @ 23:51) (96% - 99%)                        10.0   10.90 )-----------( 258      ( 09 Mar 2025 04:54 )             32.1       Physical Examination:  GENERAL: No acute distress noted during examination. + Anxious and tearful.   NERVOUS SYSTEM:  Alert & oriented X3 with appropriate concentration. No focal or lateralizing neurologic deficits noted.   HEAD: Atraumatic and normocephalic.  EYES: EOMI/PERRLA. Conjunctiva and sclera clear  ENMT: Moist mucous membranes.   NECK: Supple with no JVD.   CHEST: Clear to ascultation bilaterally. No rales, rhonchi, wheezing, or rubs heard. Symmetrical/bilateral chest wall rise.   HEART: Regular rate and rhythm. Normotensive.  ABDOMEN: Soft, nontender, and nondistended.   EXTREMITIES:  2+ peripheral pulses without clubbing, cyanosis, or edema.     Assessment/Plan:  34y-year-old Female with a past medical history of ______ , admitted for ___, now with _____ .       1)   2)   3)   4) Will endorse the above diagnostics and findings to the day medicine team for review and follow up. Will additionally sign out to day medicine teams to follow with relevant specialties.     Rocio Alvarado NP  Department of Medicine Chart/Event Note  [facility] ESS 16R  HOWARD SMITH, 34y, Female  [id number]    Notified by RN that the above patient went to use the commode, had a syncopal event and was lowered to the floor.     Events/History of Present Illness  33 y/o F PMHx CHF, TIA, DM type 1 on insulin pump presented to the ED for break through seizures  Since November she's had 6 hospitalizations where she was initial diagnosed with CHF with unclear etiology, TIA and new onset seizures started on Keppra, with increased ambulatory difficulty.   Now, Patient has no complaints, NAD, VSS. Patient states that she attempted to get up to use the commode when she began to pass out, and she was lowered to the floor by hospital staff. No injuries were sustained. Per patient, she has a history of orthostatic hypotension and she frequently "passes out when standing". Patient tearful and frustrated by her current medical situation.        Review of Systems:  Constitutional: No chills, or fatigue.  Neurologic: No headache, dizziness, vision/speech changes, numbness, or weakness.  Respiratory: No cough, wheezing, shortness of breath, or dyspnea.   Cardiovascular: No chest pain, pressure, or palpitations.   GI: No abdominal pain, nausea, vomiting, diarrhea, constipation.   : No dysuria, burning, frequency, incontinence, or retention.     T(C): 36.4 (03-09-25 @ 23:51), Max: 36.7 (03-09-25 @ 08:50)  HR: 88 (03-09-25 @ 23:51) (74 - 100)  BP: 134/91 (03-09-25 @ 23:51) (102/66 - 180/100)  RR: 19 (03-09-25 @ 23:51) (19 - 20)  SpO2: 98% (03-09-25 @ 23:51) (96% - 99%)               Physical Examination:  GENERAL: No acute distress noted during examination. Tearful.   NERVOUS SYSTEM:  Alert & oriented X3 with appropriate concentration. No focal or lateralizing neurologic deficits noted.   CHEST: Clear to ascultation bilaterally. No rales, rhonchi, wheezing, or rubs heard. Symmetrical/bilateral chest wall rise.   HEART: Regular rate and rhythm. Normotensive.  ABDOMEN: Soft, nontender, and nondistended.   EXTREMITIES:  2+ peripheral pulses without clubbing, cyanosis, or edema.   SKIN: No ecchymosis, abrasion or lacerations.     Assessment/Plan:  34y-year-old Female with a past medical history of CHF, TIA, DM type 1, admitted for break through seizures, now s/p orthostatic episode.    1) Patient to slowly changes positions    2) OOB with assistance only      Rocio Alvarado NP  Department of Medicine Chart/Event Note  [facility] ESS 16R  HOWARD SMITH, 34y, Female  [id number]    Notified by RN that the above patient went to use the commode, had a syncopal event and was lowered to the floor.     Events/History of Present Illness  35 y/o F PMHx CHF, TIA, DM type 1 on insulin pump presented to the ED for break through seizures  Since November she's had 6 hospitalizations where she was initial diagnosed with CHF with unclear etiology, TIA and new onset seizures started on Keppra, with increased ambulatory difficulty.   Now, Patient has no complaints, NAD, VSS. Patient states that she attempted to get up to use the commode when she began to pass out, and she was lowered to the floor by hospital staff. No injuries were sustained. Per patient, she has a history of orthostatic hypotension and she frequently "passes out when standing". Patient tearful and frustrated by her current medical situation.        Review of Systems:  Constitutional: No chills, or fatigue.  Neurologic: No headache, dizziness, vision/speech changes, numbness, or weakness.  Respiratory: No cough, wheezing, shortness of breath, or dyspnea.   Cardiovascular: No chest pain, pressure, or palpitations.   GI: No abdominal pain, nausea, vomiting, diarrhea, constipation.   : No dysuria, burning, frequency, incontinence, or retention.     T(C): 36.4 (03-09-25 @ 23:51), Max: 36.7 (03-09-25 @ 08:50)  HR: 88 (03-09-25 @ 23:51) (74 - 100)  BP: 134/91 (03-09-25 @ 23:51) (102/66 - 180/100)  RR: 19 (03-09-25 @ 23:51) (19 - 20)  SpO2: 98% (03-09-25 @ 23:51) (96% - 99%)               Physical Examination:  GENERAL: No acute distress noted during examination. Tearful.   NERVOUS SYSTEM:  Alert & oriented X3 with appropriate concentration. No focal or lateralizing neurologic deficits noted.   CHEST: Clear to ascultation bilaterally. No rales, rhonchi, wheezing, or rubs heard. Symmetrical/bilateral chest wall rise.   HEART: Regular rate and rhythm. Normotensive.  ABDOMEN: Soft, nontender, and nondistended.   EXTREMITIES:  2+ peripheral pulses without clubbing, cyanosis, or edema.   SKIN: No ecchymosis, abrasion or lacerations.     Assessment/Plan:  34y-year-old Female with a past medical history of CHF, TIA, DM type 1, admitted for break through seizures, now s/p orthostatic episode.    1) Patient to slowly changes positions    2) OOB with assistance only    **03:40 Addendum: Notified by RN that patient briefly desat to 68% while sleeping then SpO2 immediately increased. RN woke patient up, VSS, and patient was asymptomatic. Pt states that she does snore, has no history of sleep apnea and had required supplemental oxygen briefly during one of her last admissions. Upon review of tele/  patient found to have multiple episodes in which she briefly desat to the 70's since her admission. RN advised to notify provider if any other hypoxia events occur. Continue .     Rocio Alvarado NP  Department of Medicine

## 2025-03-10 NOTE — CONSULT NOTE ADULT - ASSESSMENT
Assessment:  33 y/o F PMHx CHF, TIA, chronic back pain, DM type 1 on insulin pump presents to the ED for break through seizures that occurred at home. Admitted for new onset seizures. Cardiology consulted for cardiogenic syncope.     Plan:   Cardiogenic syncope  #HFrEF  #Hx of uncontrolled type 1 DM   - Hx of CHF and syncope on midodrine   - ECG: NS  - TTE showed EF 45-50%      Recommendations:  - Hold GDMT due to active symptoms   - Cardiology will follow   Assessment:  35 y/o F PMHx CHF, TIA, chronic back pain, DM type 1 on insulin pump presents to the ED for break through seizures that occurred at home. Admitted for new onset seizures. Cardiology consulted for cardiogenic syncope.     Plan:   Cardiogenic syncope  #HFrEF  #Hx of uncontrolled type 1 DM   #Supine hypertension   - Severe Hypotension when sitting/standing  - Hx of CHF and syncope on midodrine   - ECG: NS  - TTE showed EF 45-50%      Recommendations:  - Hold GDMT due to active symptoms   - Cardiology will follow   Assessment:  33 y/o F PMHx CHF, TIA, chronic back pain, DM type 1 on insulin pump presents to the ED for break through seizures that occurred at home. Admitted for new onset seizures. Cardiology consulted for cardiogenic syncope.     Plan:   #HFrEF  #Hx of uncontrolled type 1 DM   #Supine hypertension   #orthostatic hypotension  - Severe Hypotension when sitting/standing  - Hx of CHF and syncope on midodrine   - ECG: NS  - TTE showed EF 45-50%      Recommendations:  - Hold GDMT due to active symptoms; avoid diuretics at this time   - Cardiology will follow

## 2025-03-10 NOTE — PROGRESS NOTE ADULT - ASSESSMENT
35 y/o F PMHx CHF, TIA, DM type 1 on insulin pump presents to the ED for break through seizures  Patient states that since November she's had 6 hospitalizations where she was initial diagnosed with CHF with unclear etiology, TIA and new onset seizures started on Keppra, with increased ambulatory difficulty. Pt is being admitted for breakthrough Seizures.     Breakthrough seizure  - s/p Keppra 1000mg in ED  - continue Keppra 1000mg po BID  -on Veeg  - CT head negative  - seizure precautions  - f/u neuro rec       HX orthostatic hypotension  Dizzy  -tele  - On midodrine  -add compression stocking   -TTE       Leukocytosis  - likely reactive with recent seizure episode  - wbc trending down  - afebrile   - cbc      DM type 1 insulin dependent  - A1c level 7  - on insulin pump with sensor on right arm. Pt will self monitor during hospital stay  - CSI with meals  - diabetic diet  - endocrine is adjusting pump.     Chronic Systolic CHF  - continue spirolactone 50mg QD  - metoprolol 25mg bid   - clinically euvolemic at this time  - No prior  TTE on chart  -TTE          DVT ppx  - lovenox 40mg SQ QD     35 y/o F PMHx CHF, TIA, DM type 1 on insulin pump presents to the ED for break through seizures  Patient states that since November she's had 6 hospitalizations where she was initial diagnosed with CHF with unclear etiology, TIA and new onset seizures started on Keppra, with increased ambulatory difficulty. Pt is being admitted for breakthrough Seizures.      Syncope  HX orthostatic hypotension    -tele  - On midodrine  -add compression stocking   -TTE  -CT head  -fall precaution  -check orth BP  -C/S Cardiology       Breakthrough seizure  - s/p Keppra 1000mg in ED  - continue Keppra 1000mg po BID  -on Veeg  - CT head negative  - seizure precautions  - f/u neuro rec           Leukocytosis  - likely reactive with recent seizure episode  - wbc trending down  - afebrile   - cbc   -UA Reviewed. Pt does not have any symptoms. hold off abx  -will f/u c/s      DM type 1 insulin dependent  - A1c level 7  - on insulin pump with sensor on right arm. Pt will self monitor during hospital stay  - CSI with meals  - diabetic diet  - endocrine is adjusting pump.     Chronic Systolic CHF  - continue spirolactone 50mg QD  - metoprolol 25mg bid   - clinically euvolemic at this time  - No prior  TTE on chart  -TTE  -i/o          DVT ppx  - lovenox 40mg SQ QD  plan of care dwpt  dw rn

## 2025-03-10 NOTE — DIETITIAN INITIAL EVALUATION ADULT - ORAL INTAKE PTA/DIET HISTORY
Pt reports having extremely poor PO intake over past 1-2 months  secondary to severe back pain. Pt reports barely eating secondary to pain. Pt endorses losing ~1lb per day noted, pt reports losing over 40lbs. Pt reports not following any specific diets; has insulin pump and eats what she wants. Pt declined diet education at this time. Encouraged increased PO intake. Brief NFPE conducted.

## 2025-03-10 NOTE — CONSULT NOTE ADULT - SUBJECTIVE AND OBJECTIVE BOX
[facility]                                              CARDIOLOGY AT [address], [place] [address]                                             Telephone: [phone]. Fax:[phone]                                                       CARDIOLOGY CONSULTATION NOTE                                                                                             History obtained by: Patient and medical record  Community Cardiologist:   Reason for Consultation: Cardiogenic syncope  Available out pt records reviewed: Yes [x  ] No [  ]    Chief complaint: Seizure  Patient is a 34y old  Female who presents with a chief complaint of Breakthrough seizures (10 Mar 2025 15:48)      HPI:  33 y/o F PMHx CHF, TIA, chronic back pain, DM type 1 on insulin pump presents to the ED for break through seizures that occurred at home.     Patient was diagnosed with type 1 DM at 20 years of age, she stated that she did not control it well, had Umgr4uv of 15-16 until she was placed on an insulin pump in her early 30s. Patient states that since November she's had 6 hospitalizations where she was initial diagnosed with CHF with unclear etiology back in November of last year also had a [facility] without signs of CAD. Patient follows with a cardiologist at [facility]. Also, TIA and new onset seizures starting in 1/2025, patient was started on Keppra, with increased ambulatory difficulty. Patient also endorses worsening dizziness/lethargy over the past few months, takes midodrine 5 mg or 10 mg twice per day depending on symptoms. Patient also has back pain, chronic, began 2-3 yrs ago, no trauma, had neurologic workup and found spondylolisthesis of lumbar spine and arthritis of c-spine. Endorses taking 5mg oxycodone and 600mg Tylenol every 4 hrs.     On this visit, per Pt, she was trying to go to bathroom where she had an episode of lightheadedness, had to be placed on a commode, had a seizure lasting 2-3 min comprised of upper extremity shaking with a blank stare, w/ post-ictal state. Then a few minutes later went into another seizure which lasted 4-5 min with similar presentation and post-ictal state. Currently patient endorses lethargy worse with ambulation, dizziness when sitting/standing, unable to eat as she cannot sit up due to severe dizziness. Currently denies fever, chills, sick contacts, headache, N/V/D/C, chest pain, chest tightness, abdominal pain. Pt was admitted for breakthrough Seizures. Cardiology consulted for HFrEF and cardiogenic syncope.       CARDIAC TESTING   ECHO:  TTE showed EF 45-50%     PAST MEDICAL HISTORY  Diabetes mellitus  Migraines  Systolic CHF, chronic  Orthostatic hypotension  Seizure disorder        PAST SURGICAL HISTORY  No significant past surgical history        SOCIAL HISTORY:  Denies smoking/alcohol/drugs    FAMILY HISTORY:  FHx: pancreatic cancer      Family History of Cardiovascular Disease:  Yes [ x ] No [  ]  Coronary Artery Disease in first degree relative: Yes [ x ] No [  ]  Sudden Cardiac Death in First degree relative: Yes [  ] No [x  ]    HOME MEDICATIONS:  Aldactone 50 mg oral tablet: 1 tab(s) orally once a day (08 Mar 2025 20:03)  Crestor 5 mg oral tablet: 1 tab(s) orally once a day (at bedtime) (08 Mar 2025 20:05)  docusate sodium 100 mg oral capsule: 1 cap(s) orally 2 times a day (08 Mar 2025 22:00)  Keppra 1000 mg oral tablet: 1 tab(s) orally 2 times a day (08 Mar 2025 20:10)  magnesium oxide: 1 3 times a day (08 Mar 2025 20:01)  methocarbamol 500 mg oral tablet: 1 tab(s) orally 3 times a day (08 Mar 2025 20:09)  metoprolol tartrate 25 mg oral tablet: 1 tab(s) orally 2 times a day (08 Mar 2025 20:04)  midodrine 10 mg oral tablet: 1 tab(s) orally 3 times a day as needed for orthostatic hypotension (08 Mar 2025 19:59)  potassium chloride: 20 milliequivalent(s) once a day (08 Mar 2025 20:02)  senna oral tablet: 2 tab(s) orally once a day (at bedtime) (08 Mar 2025 22:00)  Zoloft 50 mg oral tablet: 1 tab(s) orally once a day (08 Mar 2025 20:05)      CURRENT CARDIAC MEDICATIONS:  metoprolol tartrate 25 milliGRAM(s) Oral two times a day  midodrine. 10 milliGRAM(s) Oral three times a day  spironolactone 50 milliGRAM(s) Oral daily      CURRENT OTHER MEDICATIONS:  acetaminophen   IVPB .. 725 milliGRAM(s) IV Intermittent once, Stop order after: 1 Doses  levETIRAcetam 1000 milliGRAM(s) Oral two times a day  LORazepam   Injectable 2 milliGRAM(s) IV Push once, Stop order after: 1 Doses PRN Seizure Activity  melatonin 5 milliGRAM(s) Oral at bedtime  methocarbamol 500 milliGRAM(s) Oral three times a day  oxycodone    5 mG/acetaminophen 325 mG 1 Tablet(s) Oral every 6 hours PRN Severe Pain (7 - 10)  oxyCODONE    IR 5 milliGRAM(s) Oral every 6 hours PRN Severe Pain (7 - 10)  sertraline 50 milliGRAM(s) Oral daily  dextrose 5%. 1000 milliLiter(s) (100 mL/Hr) IV Continuous <Continuous>  dextrose 5%. 1000 milliLiter(s) (50 mL/Hr) IV Continuous <Continuous>  dextrose 50% Injectable 25 Gram(s) IV Push once, Stop order after: 1 Doses  dextrose 50% Injectable 12.5 Gram(s) IV Push once, Stop order after: 1 Doses  dextrose 50% Injectable 25 Gram(s) IV Push once, Stop order after: 1 Doses  dextrose Oral Gel 15 Gram(s) Oral once, Stop order after: 1 Doses PRN Blood Glucose LESS THAN 70 milliGRAM(s)/deciliter  enoxaparin Injectable 40 milliGRAM(s) SubCutaneous every 24 hours  glucagon  Injectable 1 milliGRAM(s) IntraMuscular once, Stop order after: 1 Doses  insulin lispro (HumaLOG) Pump 1 Each SubCutaneous Continuous Pump  multivitamin 1 Tablet(s) Oral daily  rosuvastatin 5 milliGRAM(s) Oral at bedtime      ALLERGIES:   No Known Allergies      REVIEW OF SYMPTOMS:   ALL OTHER REVIEW OF SYSTEMS ARE NEGATIVE.    VITAL SIGNS:  T(C): 36.7 (03-10-25 @ 15:14), Max: 36.9 (03-10-25 @ 01:00)  T(F): 98 (03-10-25 @ 15:14), Max: 98.4 (03-10-25 @ 01:00)  HR: 82 (03-10-25 @ 15:14) (77 - 100)  BP: 160/92 (03-10-25 @ 15:14) (90/62 - 180/100)  RR: 18 (03-10-25 @ 15:14) (18 - 20)  SpO2: 100% (03-10-25 @ 15:14) (95% - 100%)    INTAKE AND OUTPUT:       PHYSICAL EXAM:  Constitutional: Comfortable . No acute distress.   HEENT: Atraumatic and normocephalic , neck is supple . no JVD. No carotid bruit.  CNS: A&Ox3. No focal deficits.   Respiratory: CTAB, unlabored   Cardiovascular: RRR normal s1 s2, S3. No murmur. No rubs or gallop.  Gastrointestinal: Soft, non-tender. +Bowel sounds.   Extremities: 2+ Peripheral Pulses, No clubbing, cyanosis, or edema  Psychiatric: Calm . no agitation.   Skin: Warm and dry, no ulcers on extremities     LABS:                            10.5   10.43 )-----------( 246      ( 10 Mar 2025 08:48 )             32.7     03-10    139  |  104  |  9.1  ----------------------------<  108[H]  4.2   |  23.0  |  0.43[L]    Ca    8.9      10 Mar 2025 08:48        INTERPRETATION OF TELEMETRY:     ECG: NS      RADIOLOGY & ADDITIONAL STUDIES:   CT scan:   < from: CT Head No Cont (03.08.25 @ 16:31) >  IMPRESSION:    No acute intracranial bleeding.    Enlarged nasopharyngeal soft tissue, greater than expected for enlarged   adenoids for patient's age. Consider direct inspection.    < end of copied text >

## 2025-03-10 NOTE — DIETITIAN INITIAL EVALUATION ADULT - OTHER INFO
Pt is a 35 y/o F PMHx CHF, TIA, DM type 1 on insulin pump presents to the ED for break through seizures. Patient has had 6 hospitalizations where she was initial diagnosed with CHF with unclear etiology, TIA and new onset seizures started on Keppra, with increased ambulatory difficulty.    Pt admitted for breakthrough Seizures

## 2025-03-10 NOTE — PROGRESS NOTE ADULT - ASSESSMENT
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 Admitted for seizures. Of note, patient missed about 1-2 weeks of keppra due to meds not being renewed and only restarted on keppra recently . Consult for diabetes mgmt, a1c 7.    1. Moderately controlled T1DM  - On insulin pump, may continue to use, orders in place  - Pump changed 3/9, has extra supplies at bedside. Has dexcom sensor in place  - Diabetic diet when eating  - If patient is off insulin pump for any reason, start lantus 4 units with low dose sliding scale with meals    2. Seizures  - On EEG, care per neuro/primary team    3. 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 Admitted for seizures. Of note, patient missed about 1-2 weeks of keppra due to meds not being renewed and only restarted on keppra recently . Consult for diabetes mgmt, a1c 7.    1. Moderately controlled T1DM  - On insulin pump, may continue to use, orders in place  - Pump changed 3/9, has extra supplies at bedside. Has dexcom sensor in place  - Diabetic diet when eating  - If patient is off insulin pump for any reason, start lantus 4 units with low dose sliding scale with meals  - Check c-peptide/JOSE/islet cell ab    2. Seizures  - On EEG, care per neuro/primary team    3. CHF  - On spironolactone, care per primary team

## 2025-03-10 NOTE — PROGRESS NOTE ADULT - ASSESSMENT
The patient is a 34y Female with possible breakthrough seizure.     Seizure.   OK for EMU [address] [place] [facility] if available.   continue c-EEG.   Continue Keppra for now.     Diabetes Mellitus   Per medicine.     will follow with you    Santos Garg MD PhD   [phone]

## 2025-03-10 NOTE — PROGRESS NOTE ADULT - SUBJECTIVE AND OBJECTIVE BOX
Patient is a 34y old  Female who presents with a chief complaint of Breakthrough seizures (10 Mar 2025 11:10)  pt is c/o fatigue  Episode of syncope   Denies any urinary symptoms,fever,chill,headache,cp,sob    REVIEW OF SYSTEMS: All systems are reviewed and found to be negative except above    MEDICATIONS  (STANDING):  acetaminophen   IVPB .. 725 milliGRAM(s) IV Intermittent once  dextrose 5%. 1000 milliLiter(s) (100 mL/Hr) IV Continuous <Continuous>  dextrose 5%. 1000 milliLiter(s) (50 mL/Hr) IV Continuous <Continuous>  dextrose 50% Injectable 25 Gram(s) IV Push once  dextrose 50% Injectable 12.5 Gram(s) IV Push once  dextrose 50% Injectable 25 Gram(s) IV Push once  enoxaparin Injectable 40 milliGRAM(s) SubCutaneous every 24 hours  glucagon  Injectable 1 milliGRAM(s) IntraMuscular once  insulin lispro (HumaLOG) Pump 1 Each SubCutaneous Continuous Pump  levETIRAcetam 1000 milliGRAM(s) Oral two times a day  melatonin 5 milliGRAM(s) Oral at bedtime  methocarbamol 500 milliGRAM(s) Oral three times a day  metoprolol tartrate 25 milliGRAM(s) Oral two times a day  midodrine. 10 milliGRAM(s) Oral three times a day  multivitamin 1 Tablet(s) Oral daily  rosuvastatin 5 milliGRAM(s) Oral at bedtime  sertraline 50 milliGRAM(s) Oral daily  spironolactone 50 milliGRAM(s) Oral daily    MEDICATIONS  (PRN):  dextrose Oral Gel 15 Gram(s) Oral once PRN Blood Glucose LESS THAN 70 milliGRAM(s)/deciliter  LORazepam   Injectable 2 milliGRAM(s) IV Push once PRN Seizure Activity  oxycodone    5 mG/acetaminophen 325 mG 1 Tablet(s) Oral every 6 hours PRN Severe Pain (7 - 10)  oxyCODONE    IR 5 milliGRAM(s) Oral every 6 hours PRN Severe Pain (7 - 10)      CAPILLARY BLOOD GLUCOSE      POCT Blood Glucose.: 107 mg/dL (10 Mar 2025 11:49)  POCT Blood Glucose.: 118 mg/dL (10 Mar 2025 08:19)  POCT Blood Glucose.: 140 mg/dL (09 Mar 2025 21:14)  POCT Blood Glucose.: 164 mg/dL (09 Mar 2025 16:44)    I&O's Summary      PHYSICAL EXAM:  Vital Signs Last 24 Hrs  T(C): 36.6 (10 Mar 2025 11:26), Max: 36.9 (10 Mar 2025 01:00)  T(F): 97.8 (10 Mar 2025 11:26), Max: 98.4 (10 Mar 2025 01:00)  HR: 92 (10 Mar 2025 11:26) (77 - 100)  BP: 156/92 (10 Mar 2025 11:26) (90/62 - 180/100)  BP(mean): 86 (09 Mar 2025 19:34) (86 - 101)  RR: 18 (10 Mar 2025 11:26) (18 - 20)  SpO2: 100% (10 Mar 2025 11:26) (95% - 100%)    Parameters below as of 10 Mar 2025 11:26  Patient On (Oxygen Delivery Method): room air        CONSTITUTIONAL: NAD,  EYES: PERRLA; conjunctiva and sclera clear  ENMT: Moist oral mucosa,   RESPIRATORY: Normal respiratory effort; lungs are clear to auscultation bilaterally  CARDIOVASCULAR: Regular rate and rhythm, normal S1 and S2, no murmur   EXTS: No lower extremity edema; Peripheral pulses are 2+ bilaterally  ABDOMEN: Nontender to palpation, normoactive bowel sounds, no rebound/guarding;   MUSCLOSKELETAL: ; no joint swelling or tenderness to palpation  PSYCH: affect appropriate  NEUROLOGY: A+O to person, place, and time; CN 2-12 are intact and symmetric; no gross sensory deficits;       LABS:                        10.5   10.43 )-----------( 246      ( 10 Mar 2025 08:48 )             32.7     03-10    139  |  104  |  9.1  ----------------------------<  108[H]  4.2   |  23.0  |  0.43[L]    Ca    8.9      10 Mar 2025 08:48  Mg     1.9     03-08    TPro  7.9  /  Alb  4.3  /  TBili  0.3[L]  /  DBili  x   /  AST  21  /  ALT  21  /  AlkPhos  61  03-08          Urinalysis Basic - ( 10 Mar 2025 08:48 )    Color: x / Appearance: x / SG: x / pH: x  Gluc: 108 mg/dL / Ketone: x  / Bili: x / Urobili: x   Blood: x / Protein: x / Nitrite: x   Leuk Esterase: x / RBC: x / WBC x   Sq Epi: x / Non Sq Epi: x / Bacteria: x          RADIOLOGY & ADDITIONAL TESTS:  Results Reviewed:

## 2025-03-11 LAB
ANION GAP SERPL CALC-SCNC: 14 MMOL/L — SIGNIFICANT CHANGE UP (ref 5–17)
BUN SERPL-MCNC: 9.8 MG/DL — SIGNIFICANT CHANGE UP (ref 8–20)
C PEPTIDE SERPL-MCNC: 1.1 NG/ML — SIGNIFICANT CHANGE UP (ref 1.1–4.4)
CALCIUM SERPL-MCNC: 9.1 MG/DL — SIGNIFICANT CHANGE UP (ref 8.4–10.5)
CHLORIDE SERPL-SCNC: 105 MMOL/L — SIGNIFICANT CHANGE UP (ref 96–108)
CO2 SERPL-SCNC: 22 MMOL/L — SIGNIFICANT CHANGE UP (ref 22–29)
CREAT SERPL-MCNC: 0.42 MG/DL — LOW (ref 0.5–1.3)
EGFR: 132 ML/MIN/1.73M2 — SIGNIFICANT CHANGE UP
EGFR: 132 ML/MIN/1.73M2 — SIGNIFICANT CHANGE UP
GLUCOSE BLDC GLUCOMTR-MCNC: 95 MG/DL — SIGNIFICANT CHANGE UP (ref 70–99)
GLUCOSE BLDC GLUCOMTR-MCNC: 97 MG/DL — SIGNIFICANT CHANGE UP (ref 70–99)
GLUCOSE SERPL-MCNC: 126 MG/DL — HIGH (ref 70–99)
HCT VFR BLD CALC: 33 % — LOW (ref 34.5–45)
HGB BLD-MCNC: 10.5 G/DL — LOW (ref 11.5–15.5)
MCHC RBC-ENTMCNC: 28 PG — SIGNIFICANT CHANGE UP (ref 27–34)
MCHC RBC-ENTMCNC: 31.8 G/DL — LOW (ref 32–36)
MCV RBC AUTO: 88 FL — SIGNIFICANT CHANGE UP (ref 80–100)
NRBC # BLD AUTO: 0 K/UL — SIGNIFICANT CHANGE UP (ref 0–0)
NRBC # FLD: 0 K/UL — SIGNIFICANT CHANGE UP (ref 0–0)
NRBC BLD AUTO-RTO: 0 /100 WBCS — SIGNIFICANT CHANGE UP (ref 0–0)
PLATELET # BLD AUTO: 265 K/UL — SIGNIFICANT CHANGE UP (ref 150–400)
PMV BLD: 11.1 FL — SIGNIFICANT CHANGE UP (ref 7–13)
POTASSIUM SERPL-MCNC: 3.5 MMOL/L — SIGNIFICANT CHANGE UP (ref 3.5–5.3)
POTASSIUM SERPL-SCNC: 3.5 MMOL/L — SIGNIFICANT CHANGE UP (ref 3.5–5.3)
RBC # BLD: 3.75 M/UL — LOW (ref 3.8–5.2)
RBC # FLD: 11.9 % — SIGNIFICANT CHANGE UP (ref 10.3–14.5)
SODIUM SERPL-SCNC: 141 MMOL/L — SIGNIFICANT CHANGE UP (ref 135–145)
WBC # BLD: 7.36 K/UL — SIGNIFICANT CHANGE UP (ref 3.8–10.5)
WBC # FLD AUTO: 7.36 K/UL — SIGNIFICANT CHANGE UP (ref 3.8–10.5)

## 2025-03-11 PROCEDURE — 99233 SBSQ HOSP IP/OBS HIGH 50: CPT | Mod: 25

## 2025-03-11 PROCEDURE — 99232 SBSQ HOSP IP/OBS MODERATE 35: CPT

## 2025-03-11 PROCEDURE — 95718 EEG PHYS/QHP 2-12 HR W/VEEG: CPT

## 2025-03-11 RX ORDER — LIDOCAINE HYDROCHLORIDE 20 MG/ML
1 JELLY TOPICAL ONCE
Refills: 0 | Status: COMPLETED | OUTPATIENT
Start: 2025-03-11 | End: 2025-03-11

## 2025-03-11 RX ADMIN — OXYCODONE HYDROCHLORIDE 5 MILLIGRAM(S): 30 TABLET ORAL at 18:09

## 2025-03-11 RX ADMIN — CEFTRIAXONE 1000 MILLIGRAM(S): 500 INJECTION, POWDER, FOR SOLUTION INTRAMUSCULAR; INTRAVENOUS at 18:41

## 2025-03-11 RX ADMIN — MIDODRINE HYDROCHLORIDE 10 MILLIGRAM(S): 5 TABLET ORAL at 06:01

## 2025-03-11 RX ADMIN — OXYCODONE HYDROCHLORIDE 5 MILLIGRAM(S): 30 TABLET ORAL at 12:18

## 2025-03-11 RX ADMIN — LEVETIRACETAM 1000 MILLIGRAM(S): 10 INJECTION, SOLUTION INTRAVENOUS at 17:43

## 2025-03-11 RX ADMIN — Medication 725 MILLIGRAM(S): at 00:15

## 2025-03-11 RX ADMIN — Medication 1 TABLET(S): at 12:19

## 2025-03-11 RX ADMIN — METHOCARBAMOL 500 MILLIGRAM(S): 500 TABLET, FILM COATED ORAL at 14:37

## 2025-03-11 RX ADMIN — Medication 5 MILLIGRAM(S): at 21:58

## 2025-03-11 RX ADMIN — ROSUVASTATIN CALCIUM 5 MILLIGRAM(S): 5 TABLET, FILM COATED ORAL at 21:59

## 2025-03-11 RX ADMIN — SERTRALINE 50 MILLIGRAM(S): 100 TABLET, FILM COATED ORAL at 12:19

## 2025-03-11 RX ADMIN — LIDOCAINE HYDROCHLORIDE 1 PATCH: 20 JELLY TOPICAL at 23:24

## 2025-03-11 RX ADMIN — MIDODRINE HYDROCHLORIDE 10 MILLIGRAM(S): 5 TABLET ORAL at 14:36

## 2025-03-11 RX ADMIN — METHOCARBAMOL 500 MILLIGRAM(S): 500 TABLET, FILM COATED ORAL at 06:01

## 2025-03-11 RX ADMIN — LEVETIRACETAM 1000 MILLIGRAM(S): 10 INJECTION, SOLUTION INTRAVENOUS at 06:01

## 2025-03-11 RX ADMIN — ENOXAPARIN SODIUM 40 MILLIGRAM(S): 100 INJECTION SUBCUTANEOUS at 22:00

## 2025-03-11 RX ADMIN — METHOCARBAMOL 500 MILLIGRAM(S): 500 TABLET, FILM COATED ORAL at 21:59

## 2025-03-11 RX ADMIN — MIDODRINE HYDROCHLORIDE 10 MILLIGRAM(S): 5 TABLET ORAL at 21:59

## 2025-03-11 NOTE — EEG REPORT - NS EEG TEXT BOX
-- DAY 2 	     -- START: 3/10/2025 08:00      -- END: 	3/11/2025 08:00     -- DURATION (HRS): ?20 hr 26 min          DAILY EEG VISUAL ANALYSIS     The background was continuous, symmetric, spontaneously variable. During wakefulness, the posterior dominant rhythm consisted of symmetric, well-modulated 9.5-Hz activity that attenuated to eye opening. Low-amplitude frontal beta present.          GENERALIZED SLOWING:     None          FOCAL SLOWING:      None          SLEEP BACKGROUND:     Drowsiness was characterized by fragmentation, attenuation, and slowing of the background activity.       Stage 2 sleep was characterized by symmetric sleep spindles and K-complexes.          OTHER NON-EPILEPTIFORM FINDINGS:     None          ACTIVATION PROCEDURES:      Hyperventilation was not performed.     Photic stimulation was not performed.          INTERICTAL EPILEPTIFORM ACTIVITY:      None          EVENTS:     No events or seizures recorded.          ARTIFACTS:     Intermittent myogenic and movement artifacts.          SINGLE-LEAD EKG:     Regular rhythm          ASMs:      Levetiracetam 1000 mg q12 po               -----------------------------------------------------------------------------------------------------------     EEG CLASSIFICATION:     Normal EEG in the awake, drowsy and asleep states.     No focal or epileptiform abnormalities.     No seizures.          -----------------------------------------------------------------------------------------------------------     IMPRESSION/CLINICAL CORRELATE:     Normal video-EEG.     No epileptiform abnormalities or seizures were recorded.         -----------------------------------------------------------------------------------------------------------     Jacqueline Leon MD (Days 1-2)     Attending Physician, [facility]          ------------------------------------     EEG [place] Room: [phone]     On Call Service After Hours: [phone]  -- DAY 2 	     -- START: 3/10/2025 08:00      -- END: 	3/11/2025 14:37     -- DURATION (HRS): ?27 hr 1 min          DAILY EEG VISUAL ANALYSIS     The background was continuous, symmetric, spontaneously variable. During wakefulness, the posterior dominant rhythm consisted of symmetric, well-modulated 9.5-Hz activity that attenuated to eye opening. Low-amplitude frontal beta present.          GENERALIZED SLOWING:     None          FOCAL SLOWING:      None          SLEEP BACKGROUND:     Drowsiness was characterized by fragmentation, attenuation, and slowing of the background activity.       Stage 2 sleep was characterized by symmetric sleep spindles and K-complexes.          OTHER NON-EPILEPTIFORM FINDINGS:     None          ACTIVATION PROCEDURES:      Hyperventilation was not performed.     Photic stimulation was not performed.          INTERICTAL EPILEPTIFORM ACTIVITY:      None          EVENTS:     No events or seizures recorded.          ARTIFACTS:     Intermittent myogenic and movement artifacts.          SINGLE-LEAD EKG:     Regular rhythm          ASMs:      Levetiracetam 1000 mg q12 po               -----------------------------------------------------------------------------------------------------------     EEG CLASSIFICATION:     Normal EEG in the awake, drowsy and asleep states.     No focal or epileptiform abnormalities.     No seizures.          -----------------------------------------------------------------------------------------------------------     IMPRESSION/CLINICAL CORRELATE:     Normal video-EEG.     No epileptiform abnormalities or seizures were recorded.          -----------------------------------------------------------------------------------------------------------       Jacqueline Leon MD (Days 1-2)     Attending Physician, [facility]          ------------------------------------     EEG [place] Room: [phone]     On Call Service After Hours: [phone]

## 2025-03-11 NOTE — PROGRESS NOTE ADULT - NS ATTEND AMEND GEN_ALL_CORE FT
Patient seen and examined by me.    I have discussed my recommendation with the PA which are outlined above.  Will follow. Patient seen and examined by me.    I have discussed my recommendation with the Resident which are outlined above.  Will follow.

## 2025-03-11 NOTE — PROGRESS NOTE ADULT - SUBJECTIVE AND OBJECTIVE BOX
[facility]                                        Ne[facility] [place]                                  [facility]                                      [address]                                           [place], [place], [address]                                                [phone]        CC: seizure     HISTORY:  The patient is a 34y Female who reports was diagnosed with seizures this past year. She was admitted to [facility] after an episode. She had EEG and was started on seizure medication although she reports EEG did not show anything specific.   Now presents after further episode of seizure activity. (JW)    Interval history: episode of syncope 3/10/25, no further seizure    Review of systems (neurology): Denies headache or dizziness. Denies weakness/numbness.  Denies speech/language deficits. Denies diplopia/blurred vision.  Denies confusion    MEDICATIONS  (STANDING):  cefTRIAXone Injectable. 1000 milliGRAM(s) IV Push every 24 hours  cefTRIAXone Injectable.      dextrose 5%. 1000 milliLiter(s) (100 mL/Hr) IV Continuous <Continuous>  dextrose 5%. 1000 milliLiter(s) (50 mL/Hr) IV Continuous <Continuous>  dextrose 50% Injectable 25 Gram(s) IV Push once  dextrose 50% Injectable 12.5 Gram(s) IV Push once  dextrose 50% Injectable 25 Gram(s) IV Push once  enoxaparin Injectable 40 milliGRAM(s) SubCutaneous every 24 hours  glucagon  Injectable 1 milliGRAM(s) IntraMuscular once  insulin lispro (HumaLOG) Pump 1 Each SubCutaneous Continuous Pump  levETIRAcetam 1000 milliGRAM(s) Oral two times a day  melatonin 5 milliGRAM(s) Oral at bedtime  methocarbamol 500 milliGRAM(s) Oral three times a day  midodrine. 10 milliGRAM(s) Oral three times a day  multivitamin 1 Tablet(s) Oral daily  rosuvastatin 5 milliGRAM(s) Oral at bedtime  sertraline 50 milliGRAM(s) Oral daily    MEDICATIONS  (PRN):  dextrose Oral Gel 15 Gram(s) Oral once PRN Blood Glucose LESS THAN 70 milliGRAM(s)/deciliter  LORazepam   Injectable 2 milliGRAM(s) IV Push once PRN Seizure Activity  oxycodone    5 mG/acetaminophen 325 mG 1 Tablet(s) Oral every 6 hours PRN Severe Pain (7 - 10)  oxyCODONE    IR 5 milliGRAM(s) Oral every 6 hours PRN Severe Pain (7 - 10)      Vital Signs Last 24 Hrs  T(C): 36.9 (11 Mar 2025 10:46), Max: 36.9 (11 Mar 2025 10:46)  T(F): 98.4 (11 Mar 2025 10:46), Max: 98.4 (11 Mar 2025 10:46)  HR: 94 (11 Mar 2025 05:58) (75 - 100)  BP: 129/83 (11 Mar 2025 05:58) (80/56 - 176/113)  BP(mean): 85 (10 Mar 2025 20:36) (85 - 134)  RR: 18 (11 Mar 2025 10:46) (18 - 18)  SpO2: 100% (11 Mar 2025 10:46) (97% - 100%)    Parameters below as of 11 Mar 2025 05:17  Patient On (Oxygen Delivery Method): room air    Neuro exam:    Mental status: The patient is awake, alert, and fully oriented. There is no aphasia. Attention span is normal.      Cranial nerves: Pupils react symmetrically to light. There is no visual field deficit to confrontation. Extraocular motion is full with no nystagmus.  Facial sensation is intact. Facial musculature is symmetric. Palate elevates symmetrically. Tongue is midline.    Motor: There is normal bulk and tone.  Strength is 5/5 in the right arm and leg.   Strength is 5/5 in the left arm and leg.    Sensation: Intact to light touch and pin.     Cerebellar: There is no dysmetria on finger to nose testing.    LABS:                         10.0   10.90 )-----------( 258      ( 09 Mar 2025 04:54 )             32.1       03-09    137  |  103  |  19.7  ----------------------------<  148[H]  3.8   |  21.0[L]  |  0.56    Ca    8.6      09 Mar 2025 04:54  Mg     1.9     03-08    TPro  7.9  /  Alb  4.3  /  TBili  0.3[L]  /  DBili  x   /  AST  21  /  ALT  21  /  AlkPhos  61  03-08    ----------------------------------------------------------------------------------------------------------   EEG CLASSIFICATION 3/10-3/11/25:   Normal EEG in the awake, drowsy and asleep states.   No focal or epileptiform abnormalities.   No seizures.   -----------------------------------------------------------------------------------------------------------   IMPRESSION/CLINICAL CORRELATE:   Normal video-EEG.   No epileptiform abnormalities or seizures were recorded.    EEG CLASSIFICATION 3/9-3/10/25:   Normal EEG in the awake, drowsy and asleep states.   No focal or epileptiform abnormalities.   No seizures.   -----------------------------------------------------------------------------------------------------------   IMPRESSION/CLINICAL CORRELATE:   Normal video-EEG.   No epileptiform abnormalities or seizures were recorded.     RADIOLOGY   US Duplex Carotid Arteries Complete, Bilateral (03.10.25 @ 22:17)   IMPRESSION: No significant hemodynamic stenosis of either carotid artery.    CT Head No Cont (03.08.25 @ 16:31)   IMPRESSION:  No acute intracranial bleeding.  Enlarged nasopharyngeal soft tissue, greater than expected for enlarged   adenoids for patient's age. Consider direct inspection.

## 2025-03-11 NOTE — PROGRESS NOTE ADULT - ASSESSMENT
The patient is a 34y Female with possible breakthrough seizure.     Seizure.   OK for EMU [address] [facility] [facility] if available.   c-EEG was normal x 2 days  Her events sounds like possible combination of seizure and syncope  Continue Keppra.    Syncope  orthostatic hypotension, ?autonomic instability due to diabetes  cardiology following    Diabetes Mellitus   Per medicine.     will follow with you    Santos Garg MD PhD   [id number]

## 2025-03-11 NOTE — PROGRESS NOTE ADULT - SUBJECTIVE AND OBJECTIVE BOX
[facility]                                                         CARDIOLOGY AT [facility]                                                                  [address], [place] [address]                                                         Telephone: [phone]. Fax:[phone]                                                                             PROGRESS NOTE    Reason for follow up: HFrEF, orthostatic hypotension, supine hypertension, neurocardiogenic syncope   Update:       Review of symptoms:   Cardiac:  No chest pain. No dyspnea. No palpitations.  Respiratory: no cough. No dyspnea  Gastrointestinal: No diarrhea. No abdominal pain. No bleeding.   Neuro: No focal neuro complaints.    Vitals:  T(C): 36.7 (03-11-25 @ 05:17), Max: 36.7 (03-10-25 @ 15:14)  HR: 94 (03-11-25 @ 05:58) (75 - 100)  BP: 129/83 (03-11-25 @ 05:58) (80/56 - 176/113)  RR: 18 (03-11-25 @ 05:17) (18 - 18)  SpO2: 97% (03-11-25 @ 05:17) (97% - 100%)  Wt(kg): --  I&O's Summary    10 Mar 2025 07:01  -  11 Mar 2025 07:00  --------------------------------------------------------  IN: 350 mL / OUT: 0 mL / NET: 350 mL      Weight (kg): 48 (03-08 @ 14:33)    PHYSICAL EXAM:  Appearance: Comfortable. No acute distress  HEENT:  Atraumatic. Normocephalic.  Normal oral mucosa  Neurologic: A & O x 3, no gross focal deficits.  Cardiovascular: RRR S1 S2, No murmur, no rubs/gallops. No JVD  Respiratory: Lungs clear to auscultation, unlabored   Gastrointestinal:  Soft, Non-tender, + BS  Lower Extremities: 2+ Peripheral Pulses, No clubbing, cyanosis, or edema  Psychiatry: Patient is calm. No agitation.   Skin: warm and dry.    CURRENT CARDIAC MEDICATIONS:  midodrine. 10 milliGRAM(s) Oral three times a day      CURRENT OTHER MEDICATIONS:  cefTRIAXone Injectable. 1000 milliGRAM(s) IV Push every 24 hours  cefTRIAXone Injectable.      levETIRAcetam 1000 milliGRAM(s) Oral two times a day  LORazepam   Injectable 2 milliGRAM(s) IV Push once PRN Seizure Activity  melatonin 5 milliGRAM(s) Oral at bedtime  methocarbamol 500 milliGRAM(s) Oral three times a day  oxycodone    5 mG/acetaminophen 325 mG 1 Tablet(s) Oral every 6 hours PRN Severe Pain (7 - 10)  oxyCODONE    IR 5 milliGRAM(s) Oral every 6 hours PRN Severe Pain (7 - 10)  sertraline 50 milliGRAM(s) Oral daily  dextrose 50% Injectable 25 Gram(s) IV Push once, Stop order after: 1 Doses  dextrose 50% Injectable 12.5 Gram(s) IV Push once, Stop order after: 1 Doses  dextrose 50% Injectable 25 Gram(s) IV Push once, Stop order after: 1 Doses  dextrose Oral Gel 15 Gram(s) Oral once, Stop order after: 1 Doses PRN Blood Glucose LESS THAN 70 milliGRAM(s)/deciliter  glucagon  Injectable 1 milliGRAM(s) IntraMuscular once, Stop order after: 1 Doses  insulin lispro (HumaLOG) Pump 1 Each SubCutaneous Continuous Pump  rosuvastatin 5 milliGRAM(s) Oral at bedtime  dextrose 5%. 1000 milliLiter(s) (100 mL/Hr) IV Continuous <Continuous>  dextrose 5%. 1000 milliLiter(s) (50 mL/Hr) IV Continuous <Continuous>  enoxaparin Injectable 40 milliGRAM(s) SubCutaneous every 24 hours  multivitamin 1 Tablet(s) Oral daily      LABS:	 	                            10.5   7.36  )-----------( 265      ( 11 Mar 2025 06:03 )             33.0     03-11    141  |  105  |  9.8  ----------------------------<  126[H]  3.5   |  22.0  |  0.42[L]    Ca    9.1      11 Mar 2025 06:03        Lipid Profile:   HgA1c:   TSH: Thyroid Stimulating Hormone, Serum: 1.11 uIU/mL      TELEMETRY:   ECG: NS    DIAGNOSTIC TESTING:  [ ] Echocardiogram:   3/10  < from: TTE W or WO Ultrasound Enhancing Agent (03.10.25 @ 12:38) >   1. Left atrium is normal in size.   2. Left ventricular systolic function is mildly decreased with an ejection fraction of 46 % by Grey's method of disks. Global left ventricular hypokinesis.   3. Left ventricular global longitudinal strain is -11.0 % is abnormal (> -16%).   4. There is mild (grade 1) left ventricular diastolic dysfunction.   5. The right atrium is normal in size.   6. Normal right ventricular cavity size and normal right ventricular systolic function.   7. No significant valvular disease.   8. No pericardial effusion seen.   9. 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      Consider Cardiac CT angiography to assess for anomalous coronary artery if clinically indicated.    < end of copied text >    [ ]  Catheterization:  [ ] Stress Test:    OTHER: 	                                                                [facility]                                                         CARDIOLOGY AT [facility]                                                                  [address] [facility], [place] [address]                                                         Telephone: [phone]. Fax:[phone]                                                                             PROGRESS NOTE    Reason for follow up: HFrEF, orthostatic hypotension, supine hypertension, neurocardiogenic syncope   Update: Patient still having dizziness with sitting up/standing, events of hypotension overnight requiring midodrine.       Review of symptoms:   Cardiac:  No chest pain. No dyspnea. No palpitations.  Respiratory: no cough. No dyspnea  Gastrointestinal: No diarrhea. No abdominal pain. No bleeding.   Neuro: No focal neuro complaints.    Vitals:  T(C): 36.7 (03-11-25 @ 05:17), Max: 36.7 (03-10-25 @ 15:14)  HR: 94 (03-11-25 @ 05:58) (75 - 100)  BP: 129/83 (03-11-25 @ 05:58) (80/56 - 176/113)  RR: 18 (03-11-25 @ 05:17) (18 - 18)  SpO2: 97% (03-11-25 @ 05:17) (97% - 100%)  Wt(kg): --  I&O's Summary    10 Mar 2025 07:01  -  11 Mar 2025 07:00  --------------------------------------------------------  IN: 350 mL / OUT: 0 mL / NET: 350 mL      Weight (kg): 48 (03-08 @ 14:33)    PHYSICAL EXAM:  Appearance: Comfortable. No acute distress, EEG on  HEENT:  Atraumatic. Normocephalic.  Normal oral mucosa  Neurologic: A & O x 3, no gross focal deficits.  Cardiovascular: RRR S1 S2, No murmur, no rubs/gallops. No JVD  Respiratory: Lungs clear to auscultation, unlabored   Gastrointestinal:  Soft, Non-tender, + BS  Lower Extremities: 2+ Peripheral Pulses, No clubbing, cyanosis, or edema  Psychiatry: Patient is calm. No agitation.   Skin: warm and dry.    CURRENT CARDIAC MEDICATIONS:  midodrine. 10 milliGRAM(s) Oral three times a day      CURRENT OTHER MEDICATIONS:  cefTRIAXone Injectable. 1000 milliGRAM(s) IV Push every 24 hours  cefTRIAXone Injectable.      levETIRAcetam 1000 milliGRAM(s) Oral two times a day  LORazepam   Injectable 2 milliGRAM(s) IV Push once PRN Seizure Activity  melatonin 5 milliGRAM(s) Oral at bedtime  methocarbamol 500 milliGRAM(s) Oral three times a day  oxycodone    5 mG/acetaminophen 325 mG 1 Tablet(s) Oral every 6 hours PRN Severe Pain (7 - 10)  oxyCODONE    IR 5 milliGRAM(s) Oral every 6 hours PRN Severe Pain (7 - 10)  sertraline 50 milliGRAM(s) Oral daily  dextrose 50% Injectable 25 Gram(s) IV Push once, Stop order after: 1 Doses  dextrose 50% Injectable 12.5 Gram(s) IV Push once, Stop order after: 1 Doses  dextrose 50% Injectable 25 Gram(s) IV Push once, Stop order after: 1 Doses  dextrose Oral Gel 15 Gram(s) Oral once, Stop order after: 1 Doses PRN Blood Glucose LESS THAN 70 milliGRAM(s)/deciliter  glucagon  Injectable 1 milliGRAM(s) IntraMuscular once, Stop order after: 1 Doses  insulin lispro (HumaLOG) Pump 1 Each SubCutaneous Continuous Pump  rosuvastatin 5 milliGRAM(s) Oral at bedtime  dextrose 5%. 1000 milliLiter(s) (100 mL/Hr) IV Continuous <Continuous>  dextrose 5%. 1000 milliLiter(s) (50 mL/Hr) IV Continuous <Continuous>  enoxaparin Injectable 40 milliGRAM(s) SubCutaneous every 24 hours  multivitamin 1 Tablet(s) Oral daily      LABS:	 	                            10.5   7.36  )-----------( 265      ( 11 Mar 2025 06:03 )             33.0     03-11    141  |  105  |  9.8  ----------------------------<  126[H]  3.5   |  22.0  |  0.42[L]    Ca    9.1      11 Mar 2025 06:03        Lipid Profile:   HgA1c:   TSH: Thyroid Stimulating Hormone, Serum: 1.11 uIU/mL      TELEMETRY:   ECG: NS    DIAGNOSTIC TESTING:  [ ] Echocardiogram:   3/10  < from: TTE W or WO Ultrasound Enhancing Agent (03.10.25 @ 12:38) >   1. Left atrium is normal in size.   2. Left ventricular systolic function is mildly decreased with an ejection fraction of 46 % by Grey's method of disks. Global left ventricular hypokinesis.   3. Left ventricular global longitudinal strain is -11.0 % is abnormal (> -16%).   4. There is mild (grade 1) left ventricular diastolic dysfunction.   5. The right atrium is normal in size.   6. Normal right ventricular cavity size and normal right ventricular systolic function.   7. No significant valvular disease.   8. No pericardial effusion seen.   9. 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      Consider Cardiac CT angiography to assess for anomalous coronary artery if clinically indicated.    < end of copied text >    [ ]  Catheterization:  [ ] Stress Test:    OTHER: 	                                                                [facility]                                                         CARDIOLOGY AT [facility]                                                                  [address] [facility], [place] [address]                                                         Telephone: [phone]. Fax:[phone]                                                                             PROGRESS NOTE    Reason for follow up: HFrEF, orthostatic hypotension, supine hypertension, neurocardiogenic syncope   Update: Patient still having dizziness with sitting up/standing, events of hypotension overnight requiring midodrine.       Review of symptoms:   Cardiac:  No chest pain. No dyspnea. No palpitations.  Respiratory: no cough. No dyspnea  Gastrointestinal: No diarrhea. No abdominal pain. No bleeding.   Neuro: No focal neuro complaints.    Vitals:  T(C): 36.7 (03-11-25 @ 05:17), Max: 36.7 (03-10-25 @ 15:14)  HR: 94 (03-11-25 @ 05:58) (75 - 100)  BP: 129/83 (03-11-25 @ 05:58) (80/56 - 176/113)  RR: 18 (03-11-25 @ 05:17) (18 - 18)  SpO2: 97% (03-11-25 @ 05:17) (97% - 100%)  Wt(kg): --  I&O's Summary    10 Mar 2025 07:01  -  11 Mar 2025 07:00  --------------------------------------------------------  IN: 350 mL / OUT: 0 mL / NET: 350 mL      Weight (kg): 48 (03-08 @ 14:33)    PHYSICAL EXAM:  Appearance: Comfortable. No acute distress, EEG on  HEENT:  Atraumatic. Normocephalic.  Normal oral mucosa  Neurologic: A & O x 3, no gross focal deficits.  Cardiovascular: RRR S1 S2, No murmur, no rubs/gallops. No JVD  Respiratory: Lungs clear to auscultation, unlabored   Gastrointestinal:  Soft, Non-tender, + BS  Lower Extremities: 2+ Peripheral Pulses, No clubbing, cyanosis, or edema  Psychiatry: Patient is calm. No agitation.   Skin: warm and dry.    CURRENT CARDIAC MEDICATIONS:  midodrine. 10 milliGRAM(s) Oral three times a day      CURRENT OTHER MEDICATIONS:  cefTRIAXone Injectable. 1000 milliGRAM(s) IV Push every 24 hours  cefTRIAXone Injectable.      levETIRAcetam 1000 milliGRAM(s) Oral two times a day  LORazepam   Injectable 2 milliGRAM(s) IV Push once PRN Seizure Activity  melatonin 5 milliGRAM(s) Oral at bedtime  methocarbamol 500 milliGRAM(s) Oral three times a day  oxycodone    5 mG/acetaminophen 325 mG 1 Tablet(s) Oral every 6 hours PRN Severe Pain (7 - 10)  oxyCODONE    IR 5 milliGRAM(s) Oral every 6 hours PRN Severe Pain (7 - 10)  sertraline 50 milliGRAM(s) Oral daily  dextrose 50% Injectable 25 Gram(s) IV Push once, Stop order after: 1 Doses  dextrose 50% Injectable 12.5 Gram(s) IV Push once, Stop order after: 1 Doses  dextrose 50% Injectable 25 Gram(s) IV Push once, Stop order after: 1 Doses  dextrose Oral Gel 15 Gram(s) Oral once, Stop order after: 1 Doses PRN Blood Glucose LESS THAN 70 milliGRAM(s)/deciliter  glucagon  Injectable 1 milliGRAM(s) IntraMuscular once, Stop order after: 1 Doses  insulin lispro (HumaLOG) Pump 1 Each SubCutaneous Continuous Pump  rosuvastatin 5 milliGRAM(s) Oral at bedtime  dextrose 5%. 1000 milliLiter(s) (100 mL/Hr) IV Continuous <Continuous>  dextrose 5%. 1000 milliLiter(s) (50 mL/Hr) IV Continuous <Continuous>  enoxaparin Injectable 40 milliGRAM(s) SubCutaneous every 24 hours  multivitamin 1 Tablet(s) Oral daily      LABS:	 	                            10.5   7.36  )-----------( 265      ( 11 Mar 2025 06:03 )             33.0     03-11    141  |  105  |  9.8  ----------------------------<  126[H]  3.5   |  22.0  |  0.42[L]    Ca    9.1      11 Mar 2025 06:03        Lipid Profile:   HgA1c:   TSH: Thyroid Stimulating Hormone, Serum: 1.11 uIU/mL      TELEMETRY:   ECG: NS    DIAGNOSTIC TESTING:  [ ] Echocardiogram:   3/10  < from: TTE W or WO Ultrasound Enhancing Agent (03.10.25 @ 12:38) >   1. Left atrium is normal in size.   2. Left ventricular systolic function is mildly decreased with an ejection fraction of 46 % by Grey's method of disks. Global left ventricular hypokinesis.   3. Left ventricular global longitudinal strain is -11.0 % is abnormal (> -16%).   4. There is mild (grade 1) left ventricular diastolic dysfunction.   5. The right atrium is normal in size.   6. Normal right ventricular cavity size and normal right ventricular systolic function.   7. No significant valvular disease.   8. No pericardial effusion seen.   9. 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      Consider Cardiac CT angiography to assess for anomalous coronary artery if clinically indicated.    < end of copied text >    2/24/25 [facility] [place]   Conclusions (see detailed report below) Summary: 1. Left ventricle: The cavity size is normal. Normal thickness is present. There are no regional wall motion abnormalities present. The left ventricular ejection fraction is normal. The LVEF was calculated by 2D Grey's method. Left ventricular ejection fraction is 55-60%. The left ventricular filling pattern is abnormal from impaired relaxation. 2. Right ventricle: The cavity size is normal. Right ventricular systolic function is normal. 3. Left atrium: There is no evidence of a patent foramen ovale by color Doppler interrogation of the interatrial septum. 4. Mitral valve: The annulus is mildly calcified. The leaflets are mildly thickened. There is no evidence of mitral valve stenosis. There is trace mitral valve regurgitation. 5. Aortic valve: The valve is trileaflet. The leaflets are mildly thickened. There is no aortic stenosis. There is no valvular aortic regurgitation. 6. Tricuspid valve: The leaflets are mildly thickened. There is no tricuspid stenosis. There is trace tricuspid valve regurgitation. 7. Pericardium, extracardiac: There is no pericardial effusion. Findings Left ventricle: The cavity size is normal. Normal thickness is present. There are no regional wall motion abnormalities present. The left ventricular ejection fraction is normal. The LVEF was calculated by 2D Grey's method. Left ventricular ejection fraction is 55-60%. The left ventricular filling pattern is abnormal from impaired relaxation. Left atrium: The cavity size is normal in size. There is no evidence of a patent foramen ovale by color Doppler interrogation of the interatrial septum. Right ventricle: The cavity size is normal. Right ventricular systolic function is normal. Right atrium: The cavity size is normal in size. Aortic valve: The valve is trileaflet. The leaflets are mildly thickened. There is no aortic stenosis. There is no valvular aortic regurgitation. Mitral valve: The annulus is mildly calcified. The leaflets are mildly thickened. There is no evidence of mitral valve stenosis. There is trace mitral valve regurgitation. Tricuspid valve: The leaflets are mildly thickened. There is no tricuspid stenosis. There is trace tricuspid valve Nilda Mccord ([id number]) - 2/26/2025 Page 1 / 2 regurgitation. Pulmonic valve: The leaflets are normal thickness. There is no evidence of stenosis. There is no pulmonic regurgitation. Aorta: The aortic root size is normal. The ascending aorta size is normal. The aortic arch size was not well visualized. The descending aorta size is normal. Systemic veins: The inferior vena cava is normal in size. The inferior vena cava respirophasic diameter changes are in the normal range (= 50%). Pericardium: There is no pericardial effusion. Measurements Left ventricle Value 12/24/2024 Ref FAM (L) 3.4 cm 4.2 3.8 - 5.2 ESD 2.2 cm 2.9 2.2 - 3.5 IVS, ED (H) 1.0 cm -- 0.6 - 0.9 PW, ED (H) 1.1 cm 0.9 0.6 - 0.9 EDV 47 ml 79 46 - 106 ESV 16 ml 32 14 - 42 EF, 2-p 60.6% 57.8 54.0 - 74.0 E', lat radha, TDI 11.2 cm/sec 9.9 =10.0 E/e', lat radha, TDI 8.00 9.40 ---- A', lat radha, TDI 9.7 cm/sec 15.1 ---- E'/a', lat radha, TDI 1.16 0.66 ---- E', med radha, TDI (L) 6.9 cm/sec 8.6 =7.0 E/e', med radha, TDI 13.00 10.80 ---- A', med radha, TDI 11.1 cm/sec 12.7 ---- E'/a', med radha, TDI 0.62 0.68 ---- E/e', avg, TDI 10 16 =14 LVOT Value 12/24/2024 Ref Diam, S 1.9 cm 1.8 ---- Area 2.84 cm² 2.54 ---- Peak ash, S 99.9 cm/sec 89.9 ---- VTI, S 17 cm 17 -    [ ]  Catheterization:  11/2024 at [facility]  Coronary Findings Diagnostic Dominance: Right  Left Main: The vessel exhibits minimal luminal irregularities.  Left Anterior Descending: The vessel exhibits minimal luminal irregularities.  Left Circumflex: The vessel exhibits minimal luminal irregularities.  Right Coronary Artery: The vessel exhibits minimal luminal irregularities.  Intervention  No interventions have been documented.      [ ] Stress Test:    OTHER:

## 2025-03-11 NOTE — PROGRESS NOTE ADULT - SUBJECTIVE AND OBJECTIVE BOX
INTERVAL EVENTS:  Follow up diabetes management, glucoses are stable. On insulin pump    MEDICATIONS  (STANDING):  cefTRIAXone Injectable. 1000 milliGRAM(s) IV Push every 24 hours  cefTRIAXone Injectable.      dextrose 5%. 1000 milliLiter(s) (100 mL/Hr) IV Continuous <Continuous>  dextrose 5%. 1000 milliLiter(s) (50 mL/Hr) IV Continuous <Continuous>  dextrose 50% Injectable 25 Gram(s) IV Push once  dextrose 50% Injectable 12.5 Gram(s) IV Push once  dextrose 50% Injectable 25 Gram(s) IV Push once  enoxaparin Injectable 40 milliGRAM(s) SubCutaneous every 24 hours  glucagon  Injectable 1 milliGRAM(s) IntraMuscular once  insulin lispro (HumaLOG) Pump 1 Each SubCutaneous Continuous Pump  levETIRAcetam 1000 milliGRAM(s) Oral two times a day  melatonin 5 milliGRAM(s) Oral at bedtime  methocarbamol 500 milliGRAM(s) Oral three times a day  midodrine. 10 milliGRAM(s) Oral three times a day  multivitamin 1 Tablet(s) Oral daily  rosuvastatin 5 milliGRAM(s) Oral at bedtime  sertraline 50 milliGRAM(s) Oral daily    MEDICATIONS  (PRN):  dextrose Oral Gel 15 Gram(s) Oral once PRN Blood Glucose LESS THAN 70 milliGRAM(s)/deciliter  LORazepam   Injectable 2 milliGRAM(s) IV Push once PRN Seizure Activity  oxycodone    5 mG/acetaminophen 325 mG 1 Tablet(s) Oral every 6 hours PRN Severe Pain (7 - 10)  oxyCODONE    IR 5 milliGRAM(s) Oral every 6 hours PRN Severe Pain (7 - 10)    Allergies  No Known Allergies    Vital Signs Last 24 Hrs  T(C): 36.7 (11 Mar 2025 05:17), Max: 36.7 (10 Mar 2025 15:14)  T(F): 98 (11 Mar 2025 05:17), Max: 98 (10 Mar 2025 15:14)  HR: 94 (11 Mar 2025 05:58) (75 - 100)  BP: 129/83 (11 Mar 2025 05:58) (80/56 - 176/113)  BP(mean): 85 (10 Mar 2025 20:36) (85 - 134)  RR: 18 (11 Mar 2025 05:17) (18 - 18)  SpO2: 97% (11 Mar 2025 05:17) (97% - 100%)    Parameters below as of 11 Mar 2025 05:17  Patient On (Oxygen Delivery Method): room air    PHYSICAL EXAM:  General: No apparent distress  Respiratory: Lungs clear bilaterally  Cardiac: +S1, S2, no m/r/g  GI: +BS, soft, non tender, non distended  Extremities: No peripheral edema  Neuro: A+O X3    LABS:                        10.5   7.36  )-----------( 265      ( 11 Mar 2025 06:03 )             33.0     03-11    141  |  105  |  9.8  ----------------------------<  126[H]  3.5   |  22.0  |  0.42[L]    Ca    9.1      11 Mar 2025 06:03      Urinalysis Basic - ( 11 Mar 2025 06:03 )    Color: x / Appearance: x / SG: x / pH: x  Gluc: 126 mg/dL / Ketone: x  / Bili: x / Urobili: x   Blood: x / Protein: x / Nitrite: x   Leuk Esterase: x / RBC: x / WBC x   Sq Epi: x / Non Sq Epi: x / Bacteria: x    POCT Blood Glucose.: 97 mg/dL (03-11-25 @ 10:08)  POCT Blood Glucose.: 107 mg/dL (03-10-25 @ 22:59)  POCT Blood Glucose.: 140 mg/dL (03-10-25 @ 16:39)  POCT Blood Glucose.: 107 mg/dL (03-10-25 @ 11:49)    Triiodothyronine, Total (T3 Total): 83 ng/dL (03-08-25 @ 15:55)  Free Thyroxine, Serum: 1.2 ng/dL (03-08-25 @ 15:55)  Thyroid Stimulating Hormone, Serum: 1.11 uIU/mL (03-08-25 @ 15:55)

## 2025-03-11 NOTE — PROGRESS NOTE ADULT - ASSESSMENT
33 y/o F PMHx CHF, TIA, chronic back pain, DM type 1 on insulin pump presents to the ED for break through seizures that occurred at home. Admitted for new onset seizures. Cardiology consulted for cardiogenic syncope.     Plan:   HFrEF  #Hx of uncontrolled type 1 DM   #Supine hypertension   #Orthostatic hypotension  - Symptomatic severe Hypotension when sitting/standing  - Hypertension when laying flat   - Hypotensive overnight, unclear how the BP was taken   - TTE showed reduced EF with diastolic dysfunction, anomalous coronary artery   - Carotid doppler was normal   - Condition is likely due to 10 yrs of uncontrolled type 1 DM causing diffuse glycosylation of nerves/arteries causing HF and HTN when laying flat. However, a concomitant autonomic dysfunction is likely present, preventing appropriate blood vessel response with the bodies shift from horizontal to vertical creating the hypotension and symptoms of lethargy/dizziness.         Recommendations:  - Continue to hold GDMT as patient continues to have hypotension requiring midodrine   - Educate nursing staff to take BP both laying flat and sitting up.   - Place on sign out that patient has orthostatic hypotension and supine hypertension   - Encourage the wearing of compression stockings and abdominal binder at all times, especially when sitting up and getting out of bed.   - Cardiology will follow     35 y/o F PMHx CHF, TIA, chronic back pain, DM type 1 on insulin pump presents to the ED for break through seizures that occurred at home. Admitted for syncope w/ r/o new onset seizures. Cardiology consulted for cardiogenic syncope.     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 This has caused HF and HTN when laying flat due to a concomitant autonomic dysfunction likely being present. This is preventing appropriate blood vessel response with the bodies shift from horizontal to vertical creating the hypotension and symptoms of lethargy/dizziness only while sitting/standing.         Recommendations:  - Continue to hold GDMT as patient continues to have hypotension requiring midodrine   - Educate nursing staff to take BP both laying flat and sitting up.   - Strict I/Os, daily weights for proper fluid management   - Place on sign out that patient has orthostatic hypotension and supine hypertension   - Encourage the wearing of compression stockings and abdominal binder at all times, especially when sitting up and getting out of bed.   - Cardiology will follow

## 2025-03-11 NOTE — PROGRESS NOTE ADULT - ASSESSMENT
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 Admitted for seizures. Of note, patient missed about 1-2 weeks of keppra due to meds not being renewed and only restarted on keppra recently . Consult for diabetes mgmt, a1c 7.    1. Moderately controlled T1DM  - On insulin pump, may continue to use, orders in place  - Pump changed 3/9, has extra supplies at bedside. Has dexcom sensor in place  - Diabetic diet when eating  - If patient is off insulin pump for any reason, start lantus 4 units with low dose sliding scale with meals  - C-peptide 0.8 with glucose 108/JOSE and islet cell ab pending    2. Seizures  - On EEG, care per neuro/primary team  - Continue Keppra 1000mg po BID    3. Syncope/autonomic dysfunction  - Symptomatic hypotension, on midodrine  - TTE showed reduced EF with diastolic dysfunction

## 2025-03-11 NOTE — PROGRESS NOTE ADULT - SUBJECTIVE AND OBJECTIVE BOX
[facility] Division of Hospital Medicine  Renny Cohen, DO  I'm reachable on [email or web address] Teams    Patient is a 34y old  Female who presents with a chief complaint of breakthrough seizures (11 Mar 2025 10:25).      SUBJECTIVE / OVERNIGHT EVENTS:  Patient seen and examined this morning. She denies chest pain, shortness of breath, abdominal pain, or any other acute symptoms at the time. She continues to endorse dizziness whenever she has any positional changes. She understands that she is on the vEEG and getting Abx for urinary tract infection. Later in the afternoon, I was informed by PT that she became hypotensive when she was put on her feet. Cardiology is following for orthostatic hypotension and Neurology is following as well for seizures.       MEDICATIONS  (STANDING):  cefTRIAXone Injectable. 1000 milliGRAM(s) IV Push every 24 hours  cefTRIAXone Injectable.      dextrose 5%. 1000 milliLiter(s) (100 mL/Hr) IV Continuous <Continuous>  dextrose 5%. 1000 milliLiter(s) (50 mL/Hr) IV Continuous <Continuous>  dextrose 50% Injectable 25 Gram(s) IV Push once  dextrose 50% Injectable 12.5 Gram(s) IV Push once  dextrose 50% Injectable 25 Gram(s) IV Push once  enoxaparin Injectable 40 milliGRAM(s) SubCutaneous every 24 hours  glucagon  Injectable 1 milliGRAM(s) IntraMuscular once  insulin lispro (HumaLOG) Pump 1 Each SubCutaneous Continuous Pump  levETIRAcetam 1000 milliGRAM(s) Oral two times a day  melatonin 5 milliGRAM(s) Oral at bedtime  methocarbamol 500 milliGRAM(s) Oral three times a day  midodrine. 10 milliGRAM(s) Oral three times a day  multivitamin 1 Tablet(s) Oral daily  rosuvastatin 5 milliGRAM(s) Oral at bedtime  sertraline 50 milliGRAM(s) Oral daily    MEDICATIONS  (PRN):  dextrose Oral Gel 15 Gram(s) Oral once PRN Blood Glucose LESS THAN 70 milliGRAM(s)/deciliter  LORazepam   Injectable 2 milliGRAM(s) IV Push once PRN Seizure Activity  oxycodone    5 mG/acetaminophen 325 mG 1 Tablet(s) Oral every 6 hours PRN Severe Pain (7 - 10)  oxyCODONE    IR 5 milliGRAM(s) Oral every 6 hours PRN Severe Pain (7 - 10)    CAPILLARY BLOOD GLUCOSE      POCT Blood Glucose.: 97 mg/dL (11 Mar 2025 10:08)  POCT Blood Glucose.: 107 mg/dL (10 Mar 2025 22:59)  POCT Blood Glucose.: 140 mg/dL (10 Mar 2025 16:39)    I&O's Summary    10 Mar 2025 07:01  -  11 Mar 2025 07:00  --------------------------------------------------------  IN: 350 mL / OUT: 0 mL / NET: 350 mL        PHYSICAL EXAM:  Vital Signs Last 24 Hrs  T(C): 36.9 (11 Mar 2025 10:46), Max: 36.9 (11 Mar 2025 10:46)  T(F): 98.4 (11 Mar 2025 10:46), Max: 98.4 (11 Mar 2025 10:46)  HR: 94 (11 Mar 2025 05:58) (75 - 100)  BP: 129/83 (11 Mar 2025 05:58) (80/56 - 176/113)  BP(mean): 85 (10 Mar 2025 20:36) (85 - 134)  RR: 18 (11 Mar 2025 10:46) (18 - 18)  SpO2: 100% (11 Mar 2025 10:46) (97% - 100%)    Parameters below as of 11 Mar 2025 05:17  Patient On (Oxygen Delivery Method): room air        CONSTITUTIONAL: NAD, well-developed, well-groomed, appears stated age, nodes in place on head for EEG  EYES:  EOMI, conjunctiva and sclera clear  ENMT: Moist oral mucosa  NECK: Supple, no JVD  RESPIRATORY: Normal respiratory effort; lungs are clear to auscultation bilaterally  CARDIOVASCULAR: Regular rate and rhythm, normal S1 and S2  ABDOMEN: normoactive bowel sounds, soft, nontender to palpation, no distension   MUSCULOSKELETAL:  no clubbing or cyanosis of digits; no joint swelling or tenderness to palpation  PSYCH: A+O x3; affect appropriate, calm and cooperative  NEUROLOGY: CN 2-12 are intact and symmetric; no gross sensory deficits     LABS:                        10.5   7.36  )-----------( 265      ( 11 Mar 2025 06:03 )             33.0     03-11    141  |  105  |  9.8  ----------------------------<  126[H]  3.5   |  22.0  |  0.42[L]    Ca    9.1      11 Mar 2025 06:03            Urinalysis Basic - ( 11 Mar 2025 06:03 )    Color: x / Appearance: x / SG: x / pH: x  Gluc: 126 mg/dL / Ketone: x  / Bili: x / Urobili: x   Blood: x / Protein: x / Nitrite: x   Leuk Esterase: x / RBC: x / WBC x   Sq Epi: x / Non Sq Epi: x / Bacteria: x

## 2025-03-11 NOTE — PROGRESS NOTE ADULT - ASSESSMENT
Patient is a 34 year old female who is being managed as an inpatient for breakthrough seizure and syncope. She has a past medical history of type I DM on insulin pump, TIA, seizure disorder,   Prior to hospitalization, she was recently diagnosed with new onset CHF without a clear etiology, and she has been in and out of the hospital since November. She has not fully recovered and has been having increased difficulty walking. Patient is being managed as an inpatient for breakthrough seizure requiring vEEG.     ***    ncope  HX orthostatic hypotension    -tele  - On midodrine  -add compression stocking   -TTE  -CT head  -fall precaution  -check orth BP  -C/S Cardiology       Breakthrough seizure  - s/p Keppra 1000mg in ED  - continue Keppra 1000mg po BID  -on Veeg  - CT head negative  - seizure precautions  - f/u neuro rec           Leukocytosis  - likely reactive with recent seizure episode  - wbc trending down  - afebrile   - cbc   -UA Reviewed. Pt does not have any symptoms. hold off abx  -will f/u c/s      DM type 1 insulin dependent  - A1c level 7  - on insulin pump with sensor on right arm. Pt will self monitor during hospital stay  - CSI with meals  - diabetic diet  - endocrine is adjusting pump.     Chronic Systolic CHF  - continue spirolactone 50mg QD  - metoprolol 25mg bid   - clinically euvolemic at this time  - No prior  TTE on chart  -TTE  -i/o        - Continue Rocephin    -binder comp      DVT/GI ppx: Lovenox  Diet: Carb consistent  Code Status: Full code  Dispo: Pending clinical course, will likely need higher level of care Patient is a 34 year old female who is being managed as an inpatient for breakthrough seizure and syncope. She has a past medical history of type I DM on insulin pump, TIA, seizure disorder,   Prior to hospitalization, she was recently diagnosed with new onset CHF without a clear etiology, and she has been in and out of the hospital since November. She has not fully recovered and has been having increased difficulty walking. Patient is being managed as an inpatient for breakthrough seizure requiring vEEG.     Syncope  Orthostatic hypotension  Heart failure with reduced ejection fracture  - CT head negative on 3/8 and 3/10  - TTE showing LVEF is mildly decreased at 46%, global left ventricular hypokinesis  - Carotid doppler was unremarkable  - Alert and oriented x3, answers questions and engages in conversation  - Likely due to uncontrolled DM which caused autonomic dysfunction  - Holding any additional GDMT  - Continue fall precautions  - Continue compression stockings and abdominal binder (needs appropriate size)  - Continue midodrine 10mg TID  - Cardiology and Neurology following    Breakthrough seizure  Leukocytosis likely related to seizure, resolved  - s/p Keppra 1000mg IV once  - White count normalized  - CT head negative on 3/8 and 3/10  - Continue seizure precautions  - Continue Keppra 1000mg PO BID  - Continue vEEG for now  - Neurology following       Leukocytosis  - likely reactive with recent seizure episode  - wbc trending down  - afebrile   - cbc   -UA Reviewed. Pt does not have any symptoms. hold off abx  -will f/u c/s      DM type 1 insulin dependent  - A1c level 7  - on insulin pump with sensor on right arm. Pt will self monitor during hospital stay  - CSI with meals  - diabetic diet  - endocrine is adjusting pump.         - Continue Rocephin        DVT/GI ppx: Lovenox  Diet: Carb consistent  Code Status: Full code  Dispo: Pending clinical course, will likely need higher level of care Patient is a 34 year old female who is being managed as an inpatient for breakthrough seizure and syncope. She has a past medical history of type I DM on insulin pump, TIA, seizure disorder,   Prior to hospitalization, she was recently diagnosed with new onset CHF without a clear etiology, and she has been in and out of the hospital since November. She has not fully recovered and has been having increased difficulty walking. Patient is being managed as an inpatient for breakthrough seizure requiring vEEG.     Syncope  Orthostatic hypotension  Heart failure with reduced ejection fracture  - CT head negative on 3/8 and 3/10  - TTE showing LVEF is mildly decreased at 46%, global left ventricular hypokinesis  - Carotid doppler was unremarkable  - Alert and oriented x3, answers questions and engages in conversation  - Likely due to uncontrolled DM which caused autonomic dysfunction  - Holding any additional GDMT  - Continue fall precautions  - Continue compression stockings and abdominal binder (needs appropriate size)  - Continue midodrine 10mg TID  - Cardiology and Neurology following    Breakthrough seizure  Leukocytosis likely related to seizure, resolved  - s/p Keppra 1000mg IV once  - White count normalized, afebrile  - CT head negative on 3/8 and 3/10  - Continue seizure precautions  - Continue Keppra 1000mg PO BID  - Continue vEEG for now  - Neurology following     Type I DM  - A1c level 7  - Patient has insulin pump  - C-peptide 0.8 with glucose 108/JOSE and islet cell ab pending  - Endocrinology following and managing    Urinary tract infection  - Continue Rocephin for total 3 days      DVT/GI ppx: Lovenox  Diet: Carb consistent  Code Status: Full code  Dispo: Pending clinical course, will likely need higher level of care

## 2025-03-12 LAB
-  AMOXICILLIN/CLAVULANIC ACID: SIGNIFICANT CHANGE UP
-  AMPICILLIN/SULBACTAM: SIGNIFICANT CHANGE UP
-  AMPICILLIN: SIGNIFICANT CHANGE UP
-  AZTREONAM: SIGNIFICANT CHANGE UP
-  CEFAZOLIN: SIGNIFICANT CHANGE UP
-  CEFEPIME: SIGNIFICANT CHANGE UP
-  CEFOXITIN: SIGNIFICANT CHANGE UP
-  CEFTRIAXONE: SIGNIFICANT CHANGE UP
-  CEFUROXIME: SIGNIFICANT CHANGE UP
-  CIPROFLOXACIN: SIGNIFICANT CHANGE UP
-  ERTAPENEM: SIGNIFICANT CHANGE UP
-  GENTAMICIN: SIGNIFICANT CHANGE UP
-  IMIPENEM: SIGNIFICANT CHANGE UP
-  LEVOFLOXACIN: SIGNIFICANT CHANGE UP
-  MEROPENEM: SIGNIFICANT CHANGE UP
-  NITROFURANTOIN: SIGNIFICANT CHANGE UP
-  PIPERACILLIN/TAZOBACTAM: SIGNIFICANT CHANGE UP
-  TOBRAMYCIN: SIGNIFICANT CHANGE UP
-  TRIMETHOPRIM/SULFAMETHOXAZOLE: SIGNIFICANT CHANGE UP
ANION GAP SERPL CALC-SCNC: 13 MMOL/L — SIGNIFICANT CHANGE UP (ref 5–17)
BUN SERPL-MCNC: 10.2 MG/DL — SIGNIFICANT CHANGE UP (ref 8–20)
CALCIUM SERPL-MCNC: 9 MG/DL — SIGNIFICANT CHANGE UP (ref 8.4–10.5)
CHLORIDE SERPL-SCNC: 105 MMOL/L — SIGNIFICANT CHANGE UP (ref 96–108)
CO2 SERPL-SCNC: 25 MMOL/L — SIGNIFICANT CHANGE UP (ref 22–29)
CREAT SERPL-MCNC: 0.47 MG/DL — LOW (ref 0.5–1.3)
CULTURE RESULTS: ABNORMAL
EGFR: 128 ML/MIN/1.73M2 — SIGNIFICANT CHANGE UP
EGFR: 128 ML/MIN/1.73M2 — SIGNIFICANT CHANGE UP
GLUCOSE SERPL-MCNC: 131 MG/DL — HIGH (ref 70–99)
HCT VFR BLD CALC: 32 % — LOW (ref 34.5–45)
HGB BLD-MCNC: 10.4 G/DL — LOW (ref 11.5–15.5)
ISLET CELL512 AB SER-ACNC: SIGNIFICANT CHANGE UP
MAGNESIUM SERPL-MCNC: 1.8 MG/DL — SIGNIFICANT CHANGE UP (ref 1.6–2.6)
MCHC RBC-ENTMCNC: 28.4 PG — SIGNIFICANT CHANGE UP (ref 27–34)
MCHC RBC-ENTMCNC: 32.5 G/DL — SIGNIFICANT CHANGE UP (ref 32–36)
MCV RBC AUTO: 87.4 FL — SIGNIFICANT CHANGE UP (ref 80–100)
METHOD TYPE: SIGNIFICANT CHANGE UP
NRBC # BLD AUTO: 0 K/UL — SIGNIFICANT CHANGE UP (ref 0–0)
NRBC # FLD: 0 K/UL — SIGNIFICANT CHANGE UP (ref 0–0)
NRBC BLD AUTO-RTO: 0 /100 WBCS — SIGNIFICANT CHANGE UP (ref 0–0)
ORGANISM # SPEC MICROSCOPIC CNT: ABNORMAL
ORGANISM # SPEC MICROSCOPIC CNT: SIGNIFICANT CHANGE UP
PHOSPHATE SERPL-MCNC: 3.6 MG/DL — SIGNIFICANT CHANGE UP (ref 2.4–4.7)
PLATELET # BLD AUTO: 266 K/UL — SIGNIFICANT CHANGE UP (ref 150–400)
PMV BLD: 10.9 FL — SIGNIFICANT CHANGE UP (ref 7–13)
POTASSIUM SERPL-MCNC: 3.6 MMOL/L — SIGNIFICANT CHANGE UP (ref 3.5–5.3)
POTASSIUM SERPL-SCNC: 3.6 MMOL/L — SIGNIFICANT CHANGE UP (ref 3.5–5.3)
RBC # BLD: 3.66 M/UL — LOW (ref 3.8–5.2)
RBC # FLD: 11.9 % — SIGNIFICANT CHANGE UP (ref 10.3–14.5)
SODIUM SERPL-SCNC: 143 MMOL/L — SIGNIFICANT CHANGE UP (ref 135–145)
SPECIMEN SOURCE: SIGNIFICANT CHANGE UP
WBC # BLD: 7.99 K/UL — SIGNIFICANT CHANGE UP (ref 3.8–10.5)
WBC # FLD AUTO: 7.99 K/UL — SIGNIFICANT CHANGE UP (ref 3.8–10.5)

## 2025-03-12 PROCEDURE — 99232 SBSQ HOSP IP/OBS MODERATE 35: CPT

## 2025-03-12 PROCEDURE — 99233 SBSQ HOSP IP/OBS HIGH 50: CPT | Mod: 25

## 2025-03-12 RX ORDER — METOPROLOL SUCCINATE 50 MG/1
25 TABLET, EXTENDED RELEASE ORAL
Refills: 0 | Status: DISCONTINUED | OUTPATIENT
Start: 2025-03-12 | End: 2025-03-13

## 2025-03-12 RX ORDER — OXYCODONE HYDROCHLORIDE 30 MG/1
10 TABLET ORAL EVERY 6 HOURS
Refills: 0 | Status: DISCONTINUED | OUTPATIENT
Start: 2025-03-12 | End: 2025-03-19

## 2025-03-12 RX ORDER — OXYCODONE HYDROCHLORIDE 30 MG/1
5 TABLET ORAL EVERY 6 HOURS
Refills: 0 | Status: DISCONTINUED | OUTPATIENT
Start: 2025-03-12 | End: 2025-03-19

## 2025-03-12 RX ADMIN — OXYCODONE HYDROCHLORIDE 5 MILLIGRAM(S): 30 TABLET ORAL at 04:23

## 2025-03-12 RX ADMIN — LEVETIRACETAM 1000 MILLIGRAM(S): 10 INJECTION, SOLUTION INTRAVENOUS at 04:59

## 2025-03-12 RX ADMIN — OXYCODONE HYDROCHLORIDE 10 MILLIGRAM(S): 30 TABLET ORAL at 12:58

## 2025-03-12 RX ADMIN — Medication 5 MILLIGRAM(S): at 22:27

## 2025-03-12 RX ADMIN — METHOCARBAMOL 500 MILLIGRAM(S): 500 TABLET, FILM COATED ORAL at 12:16

## 2025-03-12 RX ADMIN — ROSUVASTATIN CALCIUM 5 MILLIGRAM(S): 5 TABLET, FILM COATED ORAL at 22:28

## 2025-03-12 RX ADMIN — METHOCARBAMOL 500 MILLIGRAM(S): 500 TABLET, FILM COATED ORAL at 22:27

## 2025-03-12 RX ADMIN — ENOXAPARIN SODIUM 40 MILLIGRAM(S): 100 INJECTION SUBCUTANEOUS at 22:27

## 2025-03-12 RX ADMIN — SERTRALINE 50 MILLIGRAM(S): 100 TABLET, FILM COATED ORAL at 12:15

## 2025-03-12 RX ADMIN — LIDOCAINE HYDROCHLORIDE 1 PATCH: 20 JELLY TOPICAL at 07:50

## 2025-03-12 RX ADMIN — LIDOCAINE HYDROCHLORIDE 1 PATCH: 20 JELLY TOPICAL at 12:13

## 2025-03-12 RX ADMIN — METHOCARBAMOL 500 MILLIGRAM(S): 500 TABLET, FILM COATED ORAL at 04:59

## 2025-03-12 RX ADMIN — MIDODRINE HYDROCHLORIDE 10 MILLIGRAM(S): 5 TABLET ORAL at 12:16

## 2025-03-12 RX ADMIN — OXYCODONE HYDROCHLORIDE 10 MILLIGRAM(S): 30 TABLET ORAL at 19:55

## 2025-03-12 RX ADMIN — METOPROLOL SUCCINATE 25 MILLIGRAM(S): 50 TABLET, EXTENDED RELEASE ORAL at 16:56

## 2025-03-12 RX ADMIN — OXYCODONE HYDROCHLORIDE 10 MILLIGRAM(S): 30 TABLET ORAL at 12:16

## 2025-03-12 RX ADMIN — OXYCODONE HYDROCHLORIDE 10 MILLIGRAM(S): 30 TABLET ORAL at 23:46

## 2025-03-12 RX ADMIN — Medication 1 TABLET(S): at 12:16

## 2025-03-12 RX ADMIN — LEVETIRACETAM 1000 MILLIGRAM(S): 10 INJECTION, SOLUTION INTRAVENOUS at 16:56

## 2025-03-12 RX ADMIN — CEFTRIAXONE 1000 MILLIGRAM(S): 500 INJECTION, POWDER, FOR SOLUTION INTRAMUSCULAR; INTRAVENOUS at 16:57

## 2025-03-12 RX ADMIN — MIDODRINE HYDROCHLORIDE 10 MILLIGRAM(S): 5 TABLET ORAL at 22:28

## 2025-03-12 RX ADMIN — OXYCODONE HYDROCHLORIDE 5 MILLIGRAM(S): 30 TABLET ORAL at 03:23

## 2025-03-12 NOTE — PROGRESS NOTE ADULT - SUBJECTIVE AND OBJECTIVE BOX
[facility] Division of Hospital Medicine  Renny Cohen,   I'm reachable on [email or web address] Teams    Patient is a 34y old  Female who presents with a chief complaint of Breakthrough seizures (12 Mar 2025 10:16)      SUBJECTIVE / OVERNIGHT EVENTS:  Patient seen and examined this morning. She denies chest pain, shortness of breath, abdominal pain, or any other acute symptoms at the time. She continues to endorse dizziness whenever she has any positional changes. vEEG was taken off and no seizures were noted. Abdominal binder was placed this morning and trials of mobilization will take place. Cardiology recommending trial of GDMT.    MEDICATIONS  (STANDING):  cefTRIAXone Injectable. 1000 milliGRAM(s) IV Push every 24 hours  cefTRIAXone Injectable.      dextrose 5%. 1000 milliLiter(s) (50 mL/Hr) IV Continuous <Continuous>  dextrose 5%. 1000 milliLiter(s) (100 mL/Hr) IV Continuous <Continuous>  dextrose 50% Injectable 25 Gram(s) IV Push once  dextrose 50% Injectable 25 Gram(s) IV Push once  dextrose 50% Injectable 12.5 Gram(s) IV Push once  enoxaparin Injectable 40 milliGRAM(s) SubCutaneous every 24 hours  glucagon  Injectable 1 milliGRAM(s) IntraMuscular once  insulin lispro (HumaLOG) Pump 1 Each SubCutaneous Continuous Pump  levETIRAcetam 1000 milliGRAM(s) Oral two times a day  melatonin 5 milliGRAM(s) Oral at bedtime  methocarbamol 500 milliGRAM(s) Oral three times a day  metoprolol tartrate 25 milliGRAM(s) Oral two times a day  midodrine. 10 milliGRAM(s) Oral three times a day  multivitamin 1 Tablet(s) Oral daily  rosuvastatin 5 milliGRAM(s) Oral at bedtime  sertraline 50 milliGRAM(s) Oral daily    MEDICATIONS  (PRN):  dextrose Oral Gel 15 Gram(s) Oral once PRN Blood Glucose LESS THAN 70 milliGRAM(s)/deciliter  LORazepam   Injectable 2 milliGRAM(s) IV Push once PRN Seizure Activity  oxyCODONE    IR 5 milliGRAM(s) Oral every 6 hours PRN Moderate Pain (4 - 6)  oxyCODONE    IR 10 milliGRAM(s) Oral every 6 hours PRN Severe Pain (7 - 10)    CAPILLARY BLOOD GLUCOSE      POCT Blood Glucose.: 95 mg/dL (11 Mar 2025 21:57)    I&O's Summary      PHYSICAL EXAM:  Vital Signs Last 24 Hrs  T(C): 36.5 (12 Mar 2025 03:20), Max: 36.8 (11 Mar 2025 21:30)  T(F): 97.7 (12 Mar 2025 03:20), Max: 98.3 (11 Mar 2025 21:30)  HR: 100 (12 Mar 2025 06:30) (92 - 100)  BP: 147/82 (12 Mar 2025 06:30) (106/73 - 162/96)  BP(mean): --  RR: 18 (12 Mar 2025 03:20) (18 - 18)  SpO2: 100% (12 Mar 2025 03:20) (98% - 100%)    Parameters below as of 12 Mar 2025 03:20  Patient On (Oxygen Delivery Method): room air        CONSTITUTIONAL: NAD, well-developed, well-groomed  EYES:  EOMI, conjunctiva and sclera clear  ENMT: Moist oral mucosa  NECK: Supple, no JVD  RESPIRATORY: Normal respiratory effort; lungs are clear to auscultation bilaterally  CARDIOVASCULAR: Regular rate and rhythm, normal S1 and S2, no murmur/rub/gallop; No lower extremity edema  ABDOMEN: normoactive bowel sounds, soft, nontender to palpation, no distension   MUSCULOSKELETAL:  no clubbing or cyanosis of digits; no joint swelling or tenderness to palpation  PSYCH: A+O x3; affect appropriate, calm and cooperative  NEUROLOGY: CN 2-12 are intact and symmetric; no gross sensory deficits     LABS:                        10.4   7.99  )-----------( 266      ( 12 Mar 2025 06:38 )             32.0     03-12    143  |  105  |  10.2  ----------------------------<  131[H]  3.6   |  25.0  |  0.47[L]    Ca    9.0      12 Mar 2025 06:38  Phos  3.6     03-12  Mg     1.8     03-12            Urinalysis Basic - ( 12 Mar 2025 06:38 )    Color: x / Appearance: x / SG: x / pH: x  Gluc: 131 mg/dL / Ketone: x  / Bili: x / Urobili: x   Blood: x / Protein: x / Nitrite: x   Leuk Esterase: x / RBC: x / WBC x   Sq Epi: x / Non Sq Epi: x / Bacteria: x        Culture - Urine (collected 10 Mar 2025 17:00)  Source: Clean Catch Clean Catch (Midstream)  Preliminary Report (12 Mar 2025 04:50):    >100,000 CFU/ml Klebsiella pneumoniae

## 2025-03-12 NOTE — PROGRESS NOTE ADULT - SUBJECTIVE AND OBJECTIVE BOX
INTERVAL EVENTS:  Follow up diabetes management, glucoses stable. Remains on insulin pump, endorses decreased appetite but eating some of her meals.    MEDICATIONS  (STANDING):  cefTRIAXone Injectable. 1000 milliGRAM(s) IV Push every 24 hours  cefTRIAXone Injectable.      dextrose 5%. 1000 milliLiter(s) (50 mL/Hr) IV Continuous <Continuous>  dextrose 5%. 1000 milliLiter(s) (100 mL/Hr) IV Continuous <Continuous>  dextrose 50% Injectable 25 Gram(s) IV Push once  dextrose 50% Injectable 25 Gram(s) IV Push once  dextrose 50% Injectable 12.5 Gram(s) IV Push once  enoxaparin Injectable 40 milliGRAM(s) SubCutaneous every 24 hours  glucagon  Injectable 1 milliGRAM(s) IntraMuscular once  insulin lispro (HumaLOG) Pump 1 Each SubCutaneous Continuous Pump  levETIRAcetam 1000 milliGRAM(s) Oral two times a day  melatonin 5 milliGRAM(s) Oral at bedtime  methocarbamol 500 milliGRAM(s) Oral three times a day  midodrine. 10 milliGRAM(s) Oral three times a day  multivitamin 1 Tablet(s) Oral daily  rosuvastatin 5 milliGRAM(s) Oral at bedtime  sertraline 50 milliGRAM(s) Oral daily    MEDICATIONS  (PRN):  dextrose Oral Gel 15 Gram(s) Oral once PRN Blood Glucose LESS THAN 70 milliGRAM(s)/deciliter  LORazepam   Injectable 2 milliGRAM(s) IV Push once PRN Seizure Activity  oxyCODONE    IR 5 milliGRAM(s) Oral every 6 hours PRN Moderate Pain (4 - 6)  oxyCODONE    IR 10 milliGRAM(s) Oral every 6 hours PRN Severe Pain (7 - 10)    Allergies  No Known Allergies    Vital Signs Last 24 Hrs  T(C): 36.5 (12 Mar 2025 03:20), Max: 36.9 (11 Mar 2025 10:46)  T(F): 97.7 (12 Mar 2025 03:20), Max: 98.4 (11 Mar 2025 10:46)  HR: 100 (12 Mar 2025 06:30) (92 - 100)  BP: 147/82 (12 Mar 2025 06:30) (106/73 - 162/96)  BP(mean): --  RR: 18 (12 Mar 2025 03:20) (18 - 18)  SpO2: 100% (12 Mar 2025 03:20) (98% - 100%)    Parameters below as of 12 Mar 2025 03:20  Patient On (Oxygen Delivery Method): room air    PHYSICAL EXAM:  General: No apparent distress  Respiratory: Lungs clear bilaterally  Cardiac: +S1, S2, no m/r/g  GI: +BS, soft, non tender, non distended  Extremities: No peripheral edema  Neuro: A+O X3    LABS:                        10.4   7.99  )-----------( 266      ( 12 Mar 2025 06:38 )             32.0     03-12    143  |  105  |  10.2  ----------------------------<  131[H]  3.6   |  25.0  |  0.47[L]    Ca    9.0      12 Mar 2025 06:38  Phos  3.6     03-12  Mg     1.8     03-12      Urinalysis Basic - ( 12 Mar 2025 06:38 )    Color: x / Appearance: x / SG: x / pH: x  Gluc: 131 mg/dL / Ketone: x  / Bili: x / Urobili: x   Blood: x / Protein: x / Nitrite: x   Leuk Esterase: x / RBC: x / WBC x   Sq Epi: x / Non Sq Epi: x / Bacteria: x    POCT Blood Glucose.: 95 mg/dL (03-11-25 @ 21:57)    Triiodothyronine, Total (T3 Total): 83 ng/dL (03-08-25 @ 15:55)  Free Thyroxine, Serum: 1.2 ng/dL (03-08-25 @ 15:55)  Thyroid Stimulating Hormone, Serum: 1.11 uIU/mL (03-08-25 @ 15:55)

## 2025-03-12 NOTE — PROGRESS NOTE ADULT - SUBJECTIVE AND OBJECTIVE BOX
[facility]                                                         CARDIOLOGY AT [facility]                                                                  [address], [place] [address]                                                         Telephone: [phone]. Fax:[phone]                                                                             PROGRESS NOTE    Reason for follow up: HFrEF  Update: Midodrine yesterday not required this morning.       Review of symptoms:   Cardiac:  No chest pain. No dyspnea. No palpitations.  Respiratory: no cough. No dyspnea  Gastrointestinal: No diarrhea. No abdominal pain. No bleeding.   Neuro: No focal neuro complaints.    Vitals:  T(C): 36.5 (03-12-25 @ 03:20), Max: 36.9 (03-11-25 @ 10:46)  HR: 100 (03-12-25 @ 06:30) (92 - 100)  BP: 147/82 (03-12-25 @ 06:30) (106/73 - 162/96)  RR: 18 (03-12-25 @ 03:20) (18 - 18)  SpO2: 100% (03-12-25 @ 03:20) (98% - 100%)  Wt(kg): --  I&O's Summary    Weight (kg): 48 (03-08 @ 14:33)    PHYSICAL EXAM:  Appearance: Comfortable. No acute distress  HEENT:  Atraumatic. Normocephalic.  Normal oral mucosa  Neurologic: A & O x 3, no gross focal deficits.  Cardiovascular: RRR S1 S2, No murmur, no rubs/gallops. No JVD  Respiratory: Lungs clear to auscultation, unlabored   Gastrointestinal:  Soft, Non-tender, + BS  Lower Extremities: 2+ Peripheral Pulses, No clubbing, cyanosis, or edema  Psychiatry: Patient is calm. No agitation.   Skin: warm and dry.    CURRENT CARDIAC MEDICATIONS:  midodrine. 10 milliGRAM(s) Oral three times a day      CURRENT OTHER MEDICATIONS:  cefTRIAXone Injectable. 1000 milliGRAM(s) IV Push every 24 hours  cefTRIAXone Injectable.      levETIRAcetam 1000 milliGRAM(s) Oral two times a day  LORazepam   Injectable 2 milliGRAM(s) IV Push once PRN Seizure Activity  melatonin 5 milliGRAM(s) Oral at bedtime  methocarbamol 500 milliGRAM(s) Oral three times a day  oxycodone    5 mG/acetaminophen 325 mG 1 Tablet(s) Oral every 6 hours PRN Severe Pain (7 - 10)  oxyCODONE    IR 5 milliGRAM(s) Oral every 6 hours PRN Severe Pain (7 - 10)  sertraline 50 milliGRAM(s) Oral daily  dextrose 50% Injectable 25 Gram(s) IV Push once, Stop order after: 1 Doses  dextrose 50% Injectable 25 Gram(s) IV Push once, Stop order after: 1 Doses  dextrose 50% Injectable 12.5 Gram(s) IV Push once, Stop order after: 1 Doses  dextrose Oral Gel 15 Gram(s) Oral once, Stop order after: 1 Doses PRN Blood Glucose LESS THAN 70 milliGRAM(s)/deciliter  glucagon  Injectable 1 milliGRAM(s) IntraMuscular once, Stop order after: 1 Doses  insulin lispro (HumaLOG) Pump 1 Each SubCutaneous Continuous Pump  rosuvastatin 5 milliGRAM(s) Oral at bedtime  dextrose 5%. 1000 milliLiter(s) (50 mL/Hr) IV Continuous <Continuous>  dextrose 5%. 1000 milliLiter(s) (100 mL/Hr) IV Continuous <Continuous>  enoxaparin Injectable 40 milliGRAM(s) SubCutaneous every 24 hours  multivitamin 1 Tablet(s) Oral daily      LABS:	 	                            10.4   7.99  )-----------( 266      ( 12 Mar 2025 06:38 )             32.0     03-11    141  |  105  |  9.8  ----------------------------<  126[H]  3.5   |  22.0  |  0.42[L]    Ca    9.1      11 Mar 2025 06:03          Lipid Profile:   HgA1c:   TSH: Thyroid Stimulating Hormone, Serum: 1.11 uIU/mL      TELEMETRY:   ECG: NS    DIAGNOSTIC TESTING:  [ ] Echocardiogram:   3/10  < from: TTE W or WO Ultrasound Enhancing Agent (03.10.25 @ 12:38) >   1. Left atrium is normal in size.   2. Left ventricular systolic function is mildly decreased with an ejection fraction of 46 % by Grey's method of disks. Global left ventricular hypokinesis.   3. Left ventricular global longitudinal strain is -11.0 % is abnormal (> -16%).   4. There is mild (grade 1) left ventricular diastolic dysfunction.   5. The right atrium is normal in size.   6. Normal right ventricular cavity size and normal right ventricular systolic function.   7. No significant valvular disease.   8. No pericardial effusion seen.   9. 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      Consider Cardiac CT angiography to assess for anomalous coronary artery if clinically indicated.    < end of copied text >    2/24/25 [facility]   Conclusions (see detailed report below) Summary: 1. Left ventricle: The cavity size is normal. Normal thickness is present. There are no regional wall motion abnormalities present. The left ventricular ejection fraction is normal. The LVEF was calculated by 2D Grey's method. Left ventricular ejection fraction is 55-60%. The left ventricular filling pattern is abnormal from impaired relaxation. 2. Right ventricle: The cavity size is normal. Right ventricular systolic function is normal. 3. Left atrium: There is no evidence of a patent foramen ovale by color Doppler interrogation of the interatrial septum. 4. Mitral valve: The annulus is mildly calcified. The leaflets are mildly thickened. There is no evidence of mitral valve stenosis. There is trace mitral valve regurgitation. 5. Aortic valve: The valve is trileaflet. The leaflets are mildly thickened. There is no aortic stenosis. There is no valvular aortic regurgitation. 6. Tricuspid valve: The leaflets are mildly thickened. There is no tricuspid stenosis. There is trace tricuspid valve regurgitation. 7. Pericardium, extracardiac: There is no pericardial effusion. Findings Left ventricle: The cavity size is normal. Normal thickness is present. There are no regional wall motion abnormalities present. The left ventricular ejection fraction is normal. The LVEF was calculated by 2D Grey's method. Left ventricular ejection fraction is 55-60%. The left ventricular filling pattern is abnormal from impaired relaxation. Left atrium: The cavity size is normal in size. There is no evidence of a patent foramen ovale by color Doppler interrogation of the interatrial septum. Right ventricle: The cavity size is normal. Right ventricular systolic function is normal. Right atrium: The cavity size is normal in size. Aortic valve: The valve is trileaflet. The leaflets are mildly thickened. There is no aortic stenosis. There is no valvular aortic regurgitation. Mitral valve: The annulus is mildly calcified. The leaflets are mildly thickened. There is no evidence of mitral valve stenosis. There is trace mitral valve regurgitation. Tricuspid valve: The leaflets are mildly thickened. There is no tricuspid stenosis. There is trace tricuspid valve Nilda Mccord ([id number]) - 2/26/2025 Page 1 / 2 regurgitation. Pulmonic valve: The leaflets are normal thickness. There is no evidence of stenosis. There is no pulmonic regurgitation. Aorta: The aortic root size is normal. The ascending aorta size is normal. The aortic arch size was not well visualized. The descending aorta size is normal. Systemic veins: The inferior vena cava is normal in size. The inferior vena cava respirophasic diameter changes are in the normal range (= 50%). Pericardium: There is no pericardial effusion. Measurements Left ventricle Value 12/24/2024 Ref FAM (L) 3.4 cm 4.2 3.8 - 5.2 ESD 2.2 cm 2.9 2.2 - 3.5 IVS, ED (H) 1.0 cm -- 0.6 - 0.9 PW, ED (H) 1.1 cm 0.9 0.6 - 0.9 EDV 47 ml 79 46 - 106 ESV 16 ml 32 14 - 42 EF, 2-p 60.6% 57.8 54.0 - 74.0 E', lat radha, TDI 11.2 cm/sec 9.9 =10.0 E/e', lat radha, TDI 8.00 9.40 ---- A', lat radha, TDI 9.7 cm/sec 15.1 ---- E'/a', lat radha, TDI 1.16 0.66 ---- E', med radha, TDI (L) 6.9 cm/sec 8.6 =7.0 E/e', med radha, TDI 13.00 10.80 ---- A', med radha, TDI 11.1 cm/sec 12.7 ---- E'/a', med radha, TDI 0.62 0.68 ---- E/e', avg, TDI 10 16 =14 LVOT Value 12/24/2024 Ref Diam, S 1.9 cm 1.8 ---- Area 2.84 cm² 2.54 ---- Peak ash, S 99.9 cm/sec 89.9 ---- VTI, S 17 cm 17 -    [ ]  Catheterization:  11/2024 at [facility]  Coronary Findings Diagnostic Dominance: Right  Left Main: The vessel exhibits minimal luminal irregularities.  Left Anterior Descending: The vessel exhibits minimal luminal irregularities.  Left Circumflex: The vessel exhibits minimal luminal irregularities.  Right Coronary Artery: The vessel exhibits minimal luminal irregularities.  Intervention  No interventions have been documented.      [ ] Stress Test:    OTHER: 	                                                                    [facility]                                                         CARDIOLOGY AT [facility]                                                                  [address] [facility] [address] [facility], [id number] [place] [address]                                                         Telephone: [phone]. Fax:[phone]                                                                             PROGRESS NOTE    Reason for follow up: HFrEF  Update: Midodrine yesterday but not required this morning. Patient still symptomatic when sitting/standing. Abdominal binder and compression stockings were too large.       Review of symptoms:   Cardiac:  No chest pain. No dyspnea. No palpitations.  Respiratory: no cough. No dyspnea  Gastrointestinal: No diarrhea. No abdominal pain. No bleeding.   Neuro: No focal neuro complaints.    Vitals:  T(C): 36.5 (03-12-25 @ 03:20), Max: 36.9 (03-11-25 @ 10:46)  HR: 100 (03-12-25 @ 06:30) (92 - 100)  BP: 147/82 (03-12-25 @ 06:30) (106/73 - 162/96)  RR: 18 (03-12-25 @ 03:20) (18 - 18)  SpO2: 100% (03-12-25 @ 03:20) (98% - 100%)  Wt(kg): --  I&O's Summary    Weight (kg): 48 (03-08 @ 14:33)    PHYSICAL EXAM:  Appearance: Comfortable. No acute distress  HEENT:  Atraumatic. Normocephalic.  Normal oral mucosa  Neurologic: A & O x 3, no gross focal deficits.  Cardiovascular: RRR S1 S2, No murmur, no rubs/gallops. No JVD  Respiratory: Lungs clear to auscultation, unlabored   Gastrointestinal:  Soft, Non-tender, + BS  Lower Extremities: 2+ Peripheral Pulses, No clubbing, cyanosis, or edema  Psychiatry: Patient is calm. No agitation.   Skin: warm and dry.    CURRENT CARDIAC MEDICATIONS:  midodrine. 10 milliGRAM(s) Oral three times a day      CURRENT OTHER MEDICATIONS:  cefTRIAXone Injectable. 1000 milliGRAM(s) IV Push every 24 hours  cefTRIAXone Injectable.      levETIRAcetam 1000 milliGRAM(s) Oral two times a day  LORazepam   Injectable 2 milliGRAM(s) IV Push once PRN Seizure Activity  melatonin 5 milliGRAM(s) Oral at bedtime  methocarbamol 500 milliGRAM(s) Oral three times a day  oxycodone    5 mG/acetaminophen 325 mG 1 Tablet(s) Oral every 6 hours PRN Severe Pain (7 - 10)  oxyCODONE    IR 5 milliGRAM(s) Oral every 6 hours PRN Severe Pain (7 - 10)  sertraline 50 milliGRAM(s) Oral daily  dextrose 50% Injectable 25 Gram(s) IV Push once, Stop order after: 1 Doses  dextrose 50% Injectable 25 Gram(s) IV Push once, Stop order after: 1 Doses  dextrose 50% Injectable 12.5 Gram(s) IV Push once, Stop order after: 1 Doses  dextrose Oral Gel 15 Gram(s) Oral once, Stop order after: 1 Doses PRN Blood Glucose LESS THAN 70 milliGRAM(s)/deciliter  glucagon  Injectable 1 milliGRAM(s) IntraMuscular once, Stop order after: 1 Doses  insulin lispro (HumaLOG) Pump 1 Each SubCutaneous Continuous Pump  rosuvastatin 5 milliGRAM(s) Oral at bedtime  dextrose 5%. 1000 milliLiter(s) (50 mL/Hr) IV Continuous <Continuous>  dextrose 5%. 1000 milliLiter(s) (100 mL/Hr) IV Continuous <Continuous>  enoxaparin Injectable 40 milliGRAM(s) SubCutaneous every 24 hours  multivitamin 1 Tablet(s) Oral daily      LABS:	 	                            10.4   7.99  )-----------( 266      ( 12 Mar 2025 06:38 )             32.0     03-11    141  |  105  |  9.8  ----------------------------<  126[H]  3.5   |  22.0  |  0.42[L]    Ca    9.1      11 Mar 2025 06:03          Lipid Profile:   HgA1c:   TSH: Thyroid Stimulating Hormone, Serum: 1.11 uIU/mL      TELEMETRY: Desat  ECG: NS    DIAGNOSTIC TESTING:  [ ] Echocardiogram:   3/10  < from: TTE W or WO Ultrasound Enhancing Agent (03.10.25 @ 12:38) >   1. Left atrium is normal in size.   2. Left ventricular systolic function is mildly decreased with an ejection fraction of 46 % by Grey's method of disks. Global left ventricular hypokinesis.   3. Left ventricular global longitudinal strain is -11.0 % is abnormal (> -16%).   4. There is mild (grade 1) left ventricular diastolic dysfunction.   5. The right atrium is normal in size.   6. Normal right ventricular cavity size and normal right ventricular systolic function.   7. No significant valvular disease.   8. No pericardial effusion seen.   9. 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      Consider Cardiac CT angiography to assess for anomalous coronary artery if clinically indicated.    < end of copied text >    2/24/25 [facility]   Conclusions (see detailed report below) Summary: 1. Left ventricle: The cavity size is normal. Normal thickness is present. There are no regional wall motion abnormalities present. The left ventricular ejection fraction is normal. The LVEF was calculated by 2D Grey's method. Left ventricular ejection fraction is 55-60%. The left ventricular filling pattern is abnormal from impaired relaxation. 2. Right ventricle: The cavity size is normal. Right ventricular systolic function is normal. 3. Left atrium: There is no evidence of a patent foramen ovale by color Doppler interrogation of the interatrial septum. 4. Mitral valve: The annulus is mildly calcified. The leaflets are mildly thickened. There is no evidence of mitral valve stenosis. There is trace mitral valve regurgitation. 5. Aortic valve: The valve is trileaflet. The leaflets are mildly thickened. There is no aortic stenosis. There is no valvular aortic regurgitation. 6. Tricuspid valve: The leaflets are mildly thickened. There is no tricuspid stenosis. There is trace tricuspid valve regurgitation. 7. Pericardium, extracardiac: There is no pericardial effusion. Findings Left ventricle: The cavity size is normal. Normal thickness is present. There are no regional wall motion abnormalities present. The left ventricular ejection fraction is normal. The LVEF was calculated by 2D Grey's method. Left ventricular ejection fraction is 55-60%. The left ventricular filling pattern is abnormal from impaired relaxation. Left atrium: The cavity size is normal in size. There is no evidence of a patent foramen ovale by color Doppler interrogation of the interatrial septum. Right ventricle: The cavity size is normal. Right ventricular systolic function is normal. Right atrium: The cavity size is normal in size. Aortic valve: The valve is trileaflet. The leaflets are mildly thickened. There is no aortic stenosis. There is no valvular aortic regurgitation. Mitral valve: The annulus is mildly calcified. The leaflets are mildly thickened. There is no evidence of mitral valve stenosis. There is trace mitral valve regurgitation. Tricuspid valve: The leaflets are mildly thickened. There is no tricuspid stenosis. There is trace tricuspid valve Nilda Mccord ([id number]) - 2/26/2025 Page 1 / 2 regurgitation. Pulmonic valve: The leaflets are normal thickness. There is no evidence of stenosis. There is no pulmonic regurgitation. Aorta: The aortic root size is normal. The ascending aorta size is normal. The aortic arch size was not well visualized. The descending aorta size is normal. Systemic veins: The inferior vena cava is normal in size. The inferior vena cava respirophasic diameter changes are in the normal range (= 50%). Pericardium: There is no pericardial effusion. Measurements Left ventricle Value 12/24/2024 Ref FAM (L) 3.4 cm 4.2 3.8 - 5.2 ESD 2.2 cm 2.9 2.2 - 3.5 IVS, ED (H) 1.0 cm -- 0.6 - 0.9 PW, ED (H) 1.1 cm 0.9 0.6 - 0.9 EDV 47 ml 79 46 - 106 ESV 16 ml 32 14 - 42 EF, 2-p 60.6% 57.8 54.0 - 74.0 E', lat radha, TDI 11.2 cm/sec 9.9 =10.0 E/e', lat radha, TDI 8.00 9.40 ---- A', lat rdaha, TDI 9.7 cm/sec 15.1 ---- E'/a', lat radha, TDI 1.16 0.66 ---- E', med radha, TDI (L) 6.9 cm/sec 8.6 =7.0 E/e', med radha, TDI 13.00 10.80 ---- A', med radha, TDI 11.1 cm/sec 12.7 ---- E'/a', med radha, TDI 0.62 0.68 ---- E/e', avg, TDI 10 16 =14 LVOT Value 12/24/2024 Ref Diam, S 1.9 cm 1.8 ---- Area 2.84 cm² 2.54 ---- Peak ash, S 99.9 cm/sec 89.9 ---- VTI, S 17 cm 17 -    [ ]  Catheterization:  11/2024 at [facility]  Coronary Findings Diagnostic Dominance: Right  Left Main: The vessel exhibits minimal luminal irregularities.  Left Anterior Descending: The vessel exhibits minimal luminal irregularities.  Left Circumflex: The vessel exhibits minimal luminal irregularities.  Right Coronary Artery: The vessel exhibits minimal luminal irregularities.  Intervention  No interventions have been documented.      [ ] Stress Test:    OTHER:

## 2025-03-12 NOTE — PROGRESS NOTE ADULT - ASSESSMENT
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 Admitted for seizures. Of note, patient missed about 1-2 weeks of keppra due to meds not being renewed and only restarted on keppra recently . Consult for diabetes mgmt, a1c 7.    1. Moderately controlled T1DM  - On insulin pump, may continue to use, orders in place  - Pump and sensor changed on 3/11 as per patient, has extra supplies at bedside  - Diabetic diet  - If patient is off insulin pump for any reason, start lantus 4 units with low dose sliding scale with meals  - C-peptide 0.8 with glucose 108/JOSE and islet cell ab pending    2. Seizures  - On EEG, care per neuro/primary team  - Continue Keppra 1000mg po BID    3. Syncope/autonomic dysfunction  - Symptomatic hypotension, on midodrine  - TTE showed reduced EF with diastolic dysfunction

## 2025-03-12 NOTE — PROGRESS NOTE ADULT - ASSESSMENT
Patient is a 34 year old female who is being managed as an inpatient for breakthrough seizure and syncope. She has a past medical history of type I DM on insulin pump, TIA, seizure disorder. Prior to hospitalization, she was recently diagnosed with new onset CHF without a clear etiology, and she has been in and out of the hospital since November. She has not fully recovered and has been having increased difficulty walking. Patient is being managed as an inpatient for breakthrough seizure requiring vEEG.     Syncope  Orthostatic hypotension  Heart failure with reduced ejection fracture  - CT head negative on 3/8 and 3/10  - TTE showing LVEF is mildly decreased at 46%, global left ventricular hypokinesis  - Carotid doppler was unremarkable  - Alert and oriented x3, answers questions and engages in conversation  - Likely due to uncontrolled DM which caused autonomic dysfunction  - Continue fall precautions  - Continue compression stockings and abdominal binder (needs appropriate size)  - Continue midodrine 10mg TID  - Started metoprolol tartrate 25mg BID   - Follow up Lyme disease Ab  - Cardiology and Neurology following    Breakthrough seizure  Leukocytosis likely related to seizure, resolved  - s/p Keppra 1000mg IV once  - White count normalized, afebrile  - CT head negative on 3/8 and 3/10  - vEEG showing no new seizures  - Continue seizure precautions  - Continue Keppra 1000mg PO BID  - Neurology following     Type I DM  - A1c level 7  - Patient has insulin pump  - C-peptide 0.8 with glucose 108/JOSE and islet cell ab pending  - Endocrinology following and managing    Urinary tract infection  - Continue Rocephin for total 3 days  - Follow up urine culture sensitivities    MDD  - Continue Zoloft    HLD  - Continue Crestor      DVT/GI ppx: Lovenox  Diet: Carb consistent  Code Status: Full code  Dispo: Pending clinical course, will likely need higher level of care Patient is a 34 year old female who is being managed as an inpatient for breakthrough seizure and syncope. She has a past medical history of type I DM on insulin pump, TIA, seizure disorder. Prior to hospitalization, she was recently diagnosed with new onset CHF without a clear etiology, and she has been in and out of the hospital since November. She has not fully recovered and has been having increased difficulty walking. Patient is being managed as an inpatient for breakthrough seizure requiring vEEG.     Syncope  Orthostatic hypotension  Heart failure with reduced ejection fracture  - CT head negative on 3/8 and 3/10  - TTE showing LVEF is mildly decreased at 46%, global left ventricular hypokinesis  - Carotid doppler was unremarkable  - Alert and oriented x3, answers questions and engages in conversation  - Likely due to uncontrolled DM which caused autonomic dysfunction  - Continue fall precautions  - Continue compression stockings and abdominal binder (needs appropriate size)  - Continue midodrine 10mg TID  - Started metoprolol tartrate 25mg BID   - Follow up Lyme disease Ab  - Cardiology and Neurology following    Breakthrough seizure  Leukocytosis likely related to seizure, resolved  - s/p Keppra 1000mg IV once  - White count normalized, afebrile  - CT head negative on 3/8 and 3/10  - vEEG showing no new seizures  - Continue seizure precautions  - Continue Keppra 1000mg PO BID  - Neurology following     Type I DM  - A1c level 7  - Patient has insulin pump  - C-peptide 0.8 with glucose 108/JOSE and islet cell ab pending  - Endocrinology following and managing    Urinary tract infection  - Continue Rocephin for total 3 days  - Follow up urine culture sensitivities    MDD  - Continue Zoloft    HLD  - Continue Crestor      DVT/GI ppx: Lovenox  Diet: Carb consistent  Code Status: Full code  Dispo: Pending clinical course, OT/PMR evaluation, will likely need higher level of care

## 2025-03-12 NOTE — PROGRESS NOTE ADULT - ASSESSMENT
33 y/o F PMHx CHF, TIA, chronic back pain, DM type 1 on insulin pump presents to the ED for break through seizures that occurred at home. Admitted for syncope w/ r/o new onset seizures. Cardiology consulted for cardiogenic syncope.     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 This has caused HF and HTN when laying flat due to a concomitant autonomic dysfunction likely being present. This is preventing appropriate blood vessel response with the bodies shift from horizontal to vertical creating the hypotension and symptoms of lethargy/dizziness only while sitting/standing.         Recommendations:  - Restart metoprolol tartrate 25 mg PO BID, will gradually add GDMT as tolerated   - Compression garments/abdominal binder  - Strict I/Os, daily weights for proper fluid management   - Cardiology will follow       35 y/o F PMHx CHF, TIA, chronic back pain, DM type 1 on insulin pump presents to the ED for break through seizures that occurred at home. Admitted for syncope w/ r/o new onset seizures. Cardiology consulted for cardiogenic syncope.     Plan:   Syncope 2/2 autonomic dysfunction  #HFrEF  #Hx of uncontrolled type 1 DM but now controlled   #Supine hypertension   #Orthostatic hypotension  - Symptomatic severe Hypotension when sitting/standing  - Hypertension when laying flat   - Hypotensive overnight, unclear how the BP was taken   - TTE showed reduced EF with diastolic dysfunction, anomalous coronary artery     Recommendations:  - Restart metoprolol tartrate 25 mg PO BID, will gradually add GDMT as tolerated   - Compression garments/abdominal binder need to be a size small, XXL was too big   - Strict I/Os, daily weights for proper fluid management   - Cardiology will follow

## 2025-03-13 LAB
ANION GAP SERPL CALC-SCNC: 12 MMOL/L — SIGNIFICANT CHANGE UP (ref 5–17)
B BURGDOR C6 AB SER-ACNC: NEGATIVE — SIGNIFICANT CHANGE UP
B BURGDOR IGG+IGM SER-ACNC: 0.13 INDEX — SIGNIFICANT CHANGE UP (ref 0.01–0.9)
BUN SERPL-MCNC: 11.5 MG/DL — SIGNIFICANT CHANGE UP (ref 8–20)
CALCIUM SERPL-MCNC: 9.1 MG/DL — SIGNIFICANT CHANGE UP (ref 8.4–10.5)
CHLORIDE SERPL-SCNC: 102 MMOL/L — SIGNIFICANT CHANGE UP (ref 96–108)
CO2 SERPL-SCNC: 27 MMOL/L — SIGNIFICANT CHANGE UP (ref 22–29)
CREAT SERPL-MCNC: 0.47 MG/DL — LOW (ref 0.5–1.3)
EGFR: 127 ML/MIN/1.73M2 — SIGNIFICANT CHANGE UP
EGFR: 127 ML/MIN/1.73M2 — SIGNIFICANT CHANGE UP
GLUCOSE BLDC GLUCOMTR-MCNC: 141 MG/DL — HIGH (ref 70–99)
GLUCOSE BLDC GLUCOMTR-MCNC: 168 MG/DL — HIGH (ref 70–99)
GLUCOSE BLDC GLUCOMTR-MCNC: 219 MG/DL — HIGH (ref 70–99)
GLUCOSE SERPL-MCNC: 129 MG/DL — HIGH (ref 70–99)
HCT VFR BLD CALC: 33.7 % — LOW (ref 34.5–45)
HGB BLD-MCNC: 10.9 G/DL — LOW (ref 11.5–15.5)
MAGNESIUM SERPL-MCNC: 1.9 MG/DL — SIGNIFICANT CHANGE UP (ref 1.6–2.6)
MCHC RBC-ENTMCNC: 28.2 PG — SIGNIFICANT CHANGE UP (ref 27–34)
MCHC RBC-ENTMCNC: 32.3 G/DL — SIGNIFICANT CHANGE UP (ref 32–36)
MCV RBC AUTO: 87.3 FL — SIGNIFICANT CHANGE UP (ref 80–100)
NRBC # BLD AUTO: 0 K/UL — SIGNIFICANT CHANGE UP (ref 0–0)
NRBC # FLD: 0 K/UL — SIGNIFICANT CHANGE UP (ref 0–0)
NRBC BLD AUTO-RTO: 0 /100 WBCS — SIGNIFICANT CHANGE UP (ref 0–0)
PHOSPHATE SERPL-MCNC: 4 MG/DL — SIGNIFICANT CHANGE UP (ref 2.4–4.7)
PLATELET # BLD AUTO: 272 K/UL — SIGNIFICANT CHANGE UP (ref 150–400)
PMV BLD: 10.7 FL — SIGNIFICANT CHANGE UP (ref 7–13)
POTASSIUM SERPL-MCNC: 3.6 MMOL/L — SIGNIFICANT CHANGE UP (ref 3.5–5.3)
POTASSIUM SERPL-SCNC: 3.6 MMOL/L — SIGNIFICANT CHANGE UP (ref 3.5–5.3)
RBC # BLD: 3.86 M/UL — SIGNIFICANT CHANGE UP (ref 3.8–5.2)
RBC # FLD: 12 % — SIGNIFICANT CHANGE UP (ref 10.3–14.5)
SODIUM SERPL-SCNC: 141 MMOL/L — SIGNIFICANT CHANGE UP (ref 135–145)
WBC # BLD: 7.77 K/UL — SIGNIFICANT CHANGE UP (ref 3.8–10.5)
WBC # FLD AUTO: 7.77 K/UL — SIGNIFICANT CHANGE UP (ref 3.8–10.5)

## 2025-03-13 PROCEDURE — 99232 SBSQ HOSP IP/OBS MODERATE 35: CPT

## 2025-03-13 PROCEDURE — 99223 1ST HOSP IP/OBS HIGH 75: CPT

## 2025-03-13 PROCEDURE — 99233 SBSQ HOSP IP/OBS HIGH 50: CPT | Mod: 25

## 2025-03-13 RX ORDER — MIDODRINE HYDROCHLORIDE 5 MG/1
10 TABLET ORAL THREE TIMES A DAY
Refills: 0 | Status: DISCONTINUED | OUTPATIENT
Start: 2025-03-13 | End: 2025-03-13

## 2025-03-13 RX ORDER — MIDODRINE HYDROCHLORIDE 5 MG/1
10 TABLET ORAL THREE TIMES A DAY
Refills: 0 | Status: DISCONTINUED | OUTPATIENT
Start: 2025-03-13 | End: 2025-03-18

## 2025-03-13 RX ORDER — ACETAMINOPHEN 500 MG/5ML
725 LIQUID (ML) ORAL ONCE
Refills: 0 | Status: COMPLETED | OUTPATIENT
Start: 2025-03-13 | End: 2025-03-13

## 2025-03-13 RX ADMIN — OXYCODONE HYDROCHLORIDE 10 MILLIGRAM(S): 30 TABLET ORAL at 17:58

## 2025-03-13 RX ADMIN — METHOCARBAMOL 500 MILLIGRAM(S): 500 TABLET, FILM COATED ORAL at 13:35

## 2025-03-13 RX ADMIN — Medication 725 MILLIGRAM(S): at 11:06

## 2025-03-13 RX ADMIN — SERTRALINE 50 MILLIGRAM(S): 100 TABLET, FILM COATED ORAL at 11:55

## 2025-03-13 RX ADMIN — METHOCARBAMOL 500 MILLIGRAM(S): 500 TABLET, FILM COATED ORAL at 22:21

## 2025-03-13 RX ADMIN — OXYCODONE HYDROCHLORIDE 10 MILLIGRAM(S): 30 TABLET ORAL at 17:10

## 2025-03-13 RX ADMIN — MIDODRINE HYDROCHLORIDE 10 MILLIGRAM(S): 5 TABLET ORAL at 05:06

## 2025-03-13 RX ADMIN — Medication 5 MILLIGRAM(S): at 22:21

## 2025-03-13 RX ADMIN — LEVETIRACETAM 1000 MILLIGRAM(S): 10 INJECTION, SOLUTION INTRAVENOUS at 05:05

## 2025-03-13 RX ADMIN — OXYCODONE HYDROCHLORIDE 10 MILLIGRAM(S): 30 TABLET ORAL at 05:09

## 2025-03-13 RX ADMIN — ENOXAPARIN SODIUM 40 MILLIGRAM(S): 100 INJECTION SUBCUTANEOUS at 22:23

## 2025-03-13 RX ADMIN — Medication 1 TABLET(S): at 11:55

## 2025-03-13 RX ADMIN — Medication 290 MILLIGRAM(S): at 10:34

## 2025-03-13 RX ADMIN — CEFTRIAXONE 1000 MILLIGRAM(S): 500 INJECTION, POWDER, FOR SOLUTION INTRAMUSCULAR; INTRAVENOUS at 18:26

## 2025-03-13 RX ADMIN — METOPROLOL SUCCINATE 25 MILLIGRAM(S): 50 TABLET, EXTENDED RELEASE ORAL at 05:06

## 2025-03-13 RX ADMIN — LEVETIRACETAM 1000 MILLIGRAM(S): 10 INJECTION, SOLUTION INTRAVENOUS at 17:10

## 2025-03-13 RX ADMIN — METHOCARBAMOL 500 MILLIGRAM(S): 500 TABLET, FILM COATED ORAL at 05:06

## 2025-03-13 RX ADMIN — OXYCODONE HYDROCHLORIDE 10 MILLIGRAM(S): 30 TABLET ORAL at 05:08

## 2025-03-13 RX ADMIN — ROSUVASTATIN CALCIUM 5 MILLIGRAM(S): 5 TABLET, FILM COATED ORAL at 22:21

## 2025-03-13 NOTE — PROGRESS NOTE ADULT - ASSESSMENT
34F PMHx CHF, TIA, chronic back pain, DM type 1 on insulin pump presents to the ED for break through seizures that occurred at home. Admitted for syncope w/ r/o new onset seizures. Cardiology consulted for cardiogenic syncope.     Plan:   Syncope 2/2 autonomic dysfunction  #HFrEF  #Hx of uncontrolled type 1 DM but now controlled   #Supine hypertension   #Orthostatic hypotension  - Symptomatic severe Hypotension when sitting/standing  - Hypertension when laying flat   - Normotensive overnight but still receiving midodrine TID.  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 However, this process will take week/months, recommend placement in [facility] or set up of home services that can handle the titration of these medications. If done in hospital, the patient has a higher risk of deconditioning.   - Taper Midodrine to 5 mg/day with holding parameters of SBP > 110   - Compression garments/abdominal binder need to be a size small and worn at all times  - Document BP as performed supine/sitting/standing   - Strict I/Os, daily weights for proper fluid management   - Cardiology will follow 34F PMHx CHF, TIA, chronic back pain, DM type 1 on insulin pump presents to the ED for break through seizures that occurred at home. Admitted for syncope w/ r/o new onset seizures. Cardiology consulted for cardiogenic syncope.     Plan:   Syncope 2/2 autonomic dysfunction  #HFrEF  #Hx of uncontrolled type 1 DM but now controlled   #Supine hypertension   #Orthostatic hypotension  - Symptomatic severe Hypotension when sitting/standing  - Hypertension when laying flat   - Normotensive overnight but still receiving midodrine TID.  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 However, this process will take week/months, recommend placement in acute rehab to allow for titration of these medications. If done in hospital, the patient has a higher risk of deconditioning.   - Midodrine 10mg PO TID placed PRN   - Compression garments/abdominal binder need to be a size small and worn when out of bed/sitting/walking, at night when sleeping patient can take them off   - Document BP as performed supine/sitting/standing   - Strict I/Os, daily weights for proper fluid management   - Cardiology will follow 34F PMHx CHF, TIA, chronic back pain, DM type 1 on insulin pump presents to the ED for break through seizures that occurred at home. Admitted for syncope w/ r/o new onset seizures. Cardiology consulted for cardiogenic syncope.     Plan:   Syncope 2/2 autonomic dysfunction  #HFrEF  #Hx of uncontrolled type 1 DM but now controlled   #Supine hypertension   #Orthostatic hypotension  - Symptomatic severe Hypotension when sitting/standing  - Hypertension when laying flat   - HTN overnight but still receiving midodrine TID.  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 However, this process will take week/months, recommend placement in acute rehab to allow for titration of these medications. If done in hospital, the patient has a higher risk of deconditioning.   - Midodrine 10mg PO TID placed PRN   - Compression garments/abdominal binder need to be a size small and worn when out of bed/sitting/walking, at night when sleeping patient can take them off   - Document BP as performed supine/sitting/standing   - Strict I/Os, daily weights for proper fluid management   - Cardiology will follow 34F PMHx CHF, TIA, chronic back pain, DM type 1 on insulin pump presents to the ED for break through seizures that occurred at home. Admitted for syncope w/ r/o new onset seizures. Cardiology consulted for cardiogenic syncope.     Plan:   Syncope 2/2 autonomic dysfunction  #HFrEF  #Hx of uncontrolled type 1 DM but now controlled   #Supine hypertension   #Orthostatic hypotension  - Symptomatic severe Hypotension when sitting/standing  - Hypertension when laying flat   - HTN overnight but still receiving midodrine TID.  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 However, this process will take week/months, recommend placement in acute rehab to allow for titration of these medications. If done in hospital, the patient has a higher risk of deconditioning.   - Midodrine 10mg PO TID placed PRN   - Compression garments/abdominal binder need to be a size small and worn when out of bed/sitting/walking, at night when sleeping patient can take them off   - Document BP as performed supine/sitting/standing   - Strict I/Os, daily weights for proper fluid management   - Cardiology will sign off, reconsult with any further changes or questions

## 2025-03-13 NOTE — CONSULT NOTE ADULT - SUBJECTIVE AND OBJECTIVE BOX
Patient complaining of not feeling well and nausea. Telemetry showing frequent PVC's. Notified Dr. Liset Medrano on unit. EKG performed, Mg and H&H collected. VSS, will continue to monitor. 35yF was admitted on 03-08 with break through seizures. Patient has been in and out of multiple hospitals for diagnoses of CHF, TIA, seizures and  lightheadedness.       Imaging Reviewed Today:  HEAD CT 3/10 -  No acute intracranial hemorrhage, mass effect, or shift of the midline structures. Unchanged nonspecific adenoidal hypertrophy.    TTE 3/10 -  1. Left atrium is normal in size.   2. Left ventricular systolic function is mildly decreased with an ejection fraction of 46 % by Grey's method of disks. Global left ventricular hypokinesis.   3. Left ventricular global longitudinal strain is -11.0 % is abnormal (> -16%).   4. There is mild (grade 1) left ventricular diastolic dysfunction.   5. The right atrium is normal in size.   6. Normal right ventricular cavity size and normal right ventricular systolic function.   7. No significant valvular disease.   8. No pericardial effusion seen.   9. 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      Consider Cardiac CT angiography to assess for anomalous coronary artery if clinically indicated.  ----------------------------  Patient reports she is a bit fatigued.  Notes that she has been improving physically but feels that she is not at her previous base.        VITALS  T(C): 37.4 (03-13-25 @ 04:29), Max: 37.4 (03-13-25 @ 04:29)  HR: 86 (03-13-25 @ 04:29) (83 - 93)  BP: 149/91 (03-13-25 @ 04:29) (126/78 - 149/91)  RR: 18 (03-13-25 @ 04:29) (17 - 19)  SpO2: 99% (03-13-25 @ 04:29) (96% - 100%)  Wt(kg): --    PAST MEDICAL & SURGICAL HISTORY  Diabetes mellitus    Migraines    Systolic CHF, chronic    Orthostatic hypotension    Seizure disorder    No significant past surgical history         RECENT LABS REVIEWED    CBC Full  -  ( 13 Mar 2025 06:19 )  WBC Count : 7.77 K/uL  RBC Count : 3.86 M/uL  Hemoglobin : 10.9 g/dL  Hematocrit : 33.7 %  Platelet Count - Automated : 272 K/uL  Mean Cell Volume : 87.3 fl  Mean Cell Hemoglobin : 28.2 pg  Mean Cell Hemoglobin Concentration : 32.3 g/dL  Auto Neutrophil # : x  Auto Lymphocyte # : x  Auto Monocyte # : x  Auto Eosinophil # : x  Auto Basophil # : x  Auto Neutrophil % : x  Auto Lymphocyte % : x  Auto Monocyte % : x  Auto Eosinophil % : x  Auto Basophil % : x    03-13    141  |  102  |  11.5  ----------------------------<  129[H]  3.6   |  27.0  |  0.47[L]    Ca    9.1      13 Mar 2025 06:19  Phos  4.0     03-13  Mg     1.9     03-13      Urinalysis Basic - ( 13 Mar 2025 06:19 )    Color: x / Appearance: x / SG: x / pH: x  Gluc: 129 mg/dL / Ketone: x  / Bili: x / Urobili: x   Blood: x / Protein: x / Nitrite: x   Leuk Esterase: x / RBC: x / WBC x   Sq Epi: x / Non Sq Epi: x / Bacteria: x        ALLERGIES  No Known Allergies      MEDICATIONS   cefTRIAXone Injectable. 1000 milliGRAM(s) IV Push every 24 hours  cefTRIAXone Injectable.      dextrose 5%. 1000 milliLiter(s) IV Continuous <Continuous>  dextrose 5%. 1000 milliLiter(s) IV Continuous <Continuous>  dextrose 50% Injectable 25 Gram(s) IV Push once  dextrose 50% Injectable 12.5 Gram(s) IV Push once  dextrose 50% Injectable 25 Gram(s) IV Push once  dextrose Oral Gel 15 Gram(s) Oral once PRN  enoxaparin Injectable 40 milliGRAM(s) SubCutaneous every 24 hours  glucagon  Injectable 1 milliGRAM(s) IntraMuscular once  insulin lispro (HumaLOG) Pump 1 Each SubCutaneous Continuous Pump  levETIRAcetam 1000 milliGRAM(s) Oral two times a day  LORazepam   Injectable 2 milliGRAM(s) IV Push once PRN  melatonin 5 milliGRAM(s) Oral at bedtime  methocarbamol 500 milliGRAM(s) Oral three times a day  metoprolol tartrate 25 milliGRAM(s) Oral two times a day  midodrine. 10 milliGRAM(s) Oral three times a day  multivitamin 1 Tablet(s) Oral daily  oxyCODONE    IR 5 milliGRAM(s) Oral every 6 hours PRN  oxyCODONE    IR 10 milliGRAM(s) Oral every 6 hours PRN  rosuvastatin 5 milliGRAM(s) Oral at bedtime  sertraline 50 milliGRAM(s) Oral daily      ----------------------------------------------------------------------------------------  FUNCTIONAL HISTORY  Lives with parents, 1 SANDRA  Min A with RW    FUNCTIONAL STATUS/PROGRESS  3/11 PT  Bed Mobility  Bed Mobility Training Sit-to-Supine: minimum assist (75% patient effort);  1 person assist  Bed Mobility Training Supine-to-Sit: supervsion;  stand-by assist  Bed Mobility Training Limitations: decreased strength    Sit-Stand Transfer Training  Transfer Training Sit-to-Stand Transfer: contact guard;  1 person assist;  rolling walker  Transfer Training Stand-to-Sit Transfer: minimum assist (75% patient effort);  1 person assist;  rolling walker  Sit-to-Stand Transfer Training Transfer Safety Analysis: decreased strength;  impaired balance    Gait Training  Gait Training: minimum assist (75% patient effort);  1 person assist;  rolling walker;  3 feet side stepping, pt orthostatic - BP 54/36  Gait Analysis: 3-point gait   impaired balance;  (+) orthostatics - documented by CNA in flow sheet      ----------------------------------------------------------------------------------------  PHYSICAL EXAM  Constitutional - NAD, Appears Comfortable  HEENT - NCAT, EOMI  Neck - Supple, No limited ROM  Chest - Breathing comfortably, No wheezing  Cardiovascular - S1S2   Abdomen - Soft   Extremities - No C/C/E, No calf tenderness   Neurologic Exam -                    Cognitive - AAO to self, place, date, year, situation     Communication - Fluent, No dysarthria     Cranial Nerves - CN 2-12 intact     FUNCTIONAL MOTOR EXAM - No focal deficits      Sensory - Intact to LT   Psychiatric - Mood stable, Affect WNL  ----------------------------------------------------------------------------------------  ASSESSMENT/PLAN  35yFemale with functional deficits after developing breakthrough seizures  Breakthrough Seizures - Keppra, Ativan   Syncope/Orthostatic Hypotension/HTN - Lopressor + Midodrine -- Need to optimize  Acute UTI - Rocephin   DM1 - ISS  CAD - Crestor  Mood D/O - Zoloft  Sleep - Melatonin   Pain - Tylenol, Oxycodone, Robaxin  DVT PPX - SCDs, Lovenox  Rehab/Impaired mobility and function - Patient continues to require hospitalization for the above diagnoses and ongoing active management of comorbid complications that are substantially posing a threat to bodily function, functional ability and quality of life.     RECOMMEND - OOB daily     When medically optimized, based on the patient's diagnosis, current functional status and potential for progress, recommend ACUTE inpatient rehabilitation for the functional deficits consisting of 3 hours of therapy/day & 24 hour RN/daily PMR physician for active comorbid medical management. Patient will be able to tolerate 3 hours a day.    Will need PT and OT within 48hrs AR admission.

## 2025-03-13 NOTE — PROGRESS NOTE ADULT - ASSESSMENT
The patient is a 34y Female with possible breakthrough seizure.     Seizure.   No further seizure like events  c-EEG was normal x 2 days  Her events sounds like possible combination of seizure and syncope  Continue Keppra.    Syncope  orthostatic hypotension, ?autonomic instability due to diabetes  cardiology following    Diabetes Mellitus   Per medicine.       There is no further inpatient neurologic workup suggested at this time.    Will be available for reconsultation as needed.    Thank you.   Santos Garg MD, PhD  [phone]

## 2025-03-13 NOTE — CHART NOTE - NSCHARTNOTEFT_GEN_A_CORE
Source: Patient [ x]  Family [ ]   other [ ]    Current Diet:   Diet, Regular:   Consistent Carbohydrate {Evening Snack} (CSTCHOSN)  Supplement Feeding Modality:  Oral  Glucerna Shake Cans or Servings Per Day:  1       Frequency:  Two Times a day (03-10-25 @ 09:25)    Patient reports [ ] nausea  [ ] vomiting [ ] diarrhea [ ] constipation  [ ]chewing problems [ ] swallowing issues  [ ] other: denies    PO intake:  < 50% [ ]   50-75%  [ ]   %  [ x]  other :    Source for PO intake [x ] Patient [ ] family [x ] chart [x ] staff [ ] other    Current Weight:   (3/12) 144.8 lbs  (3/11) 139.7 lbs  (3/10) 107 lbs RD bed scale weight   (3/8) 105.8 lbs     % Weight Change: Unclear accuracy of weight 2/2 inconsistency, will continue to monitor     Pertinent Medications: MEDICATIONS  (STANDING):  dextrose 5%. 1000 milliLiter(s) (50 mL/Hr) IV Continuous <Continuous>  enoxaparin Injectable 40 milliGRAM(s) SubCutaneous every 24 hours  glucagon  Injectable 1 milliGRAM(s) IntraMuscular once  insulin lispro (HumaLOG) Pump 1 Each SubCutaneous Continuous Pump  multivitamin 1 Tablet(s) Oral daily  rosuvastatin 5 milliGRAM(s) Oral at bedtime    Pertinent Labs: CBC Full  -  ( 13 Mar 2025 06:19 )  WBC Count : 7.77 K/uL  RBC Count : 3.86 M/uL  Hemoglobin : 10.9 g/dL  Hematocrit : 33.7 %  Platelet Count - Automated : 272 K/uL  Mean Cell Volume : 87.3 fl  Mean Cell Hemoglobin : 28.2 pg  Mean Cell Hemoglobin Concentration : 32.3 g/dL  03-13 Na141 mmol/L Glu 129 mg/dL[H] K+ 3.6 mmol/L Cr  0.47 mg/dL[L] BUN 11.5 mg/dL Phos 4.0 mg/dL Alb n/a   PAB n/a       Skin: No skin breakdown/edema noted     Nutrition focused physical exam conducted - found signs of malnutrition [ ]absent [x]present    Subcutaneous fat loss: [ ] Orbital fat pads region, [ ]Buccal fat region, [ x]Triceps region,  [ ]Ribs region    Muscle wasting: [ ]Temples region, [x ]Clavicle region, [x ]Shoulder region, [ x]Scapula region, [ ]Interosseous region,  [ ]thigh region, [ ]Calf region    Estimated Needs:   [x ] no change since previous assessment  [ ] recalculated:     Brief Hospital Course: 34 year old female who is being managed as an inpatient for breakthrough seizure and syncope. She has a past medical history of type I DM on insulin pump, TIA, seizure disorder. Prior to hospitalization, she was recently diagnosed with new onset CHF without a clear etiology, and she has been in and out of the hospital since November. She has not fully recovered and has been having increased difficulty walking. Patient is being managed as an inpatient for breakthrough seizure requiring vEEG.     Current Nutrition Diagnosis: Pt remains at high nutrition risk secondary to malnutrition (Severe chronic) related to inadequate protein energy intake in setting of severe pain, seizures, CHF as evidenced by meeting < 75% est needs >1 mo, 40lb(27%) weight loss < 2 months. Pt reports appetite is slowly improving, dislikes Glucerna drinks agreeable to trial Mighty Shakes (prefers vanilla). Last BM 2 days ago per Pt. Labs notable for decreased creat. Pt declines nutrition education at this time, Hgb A1c 7.0% noted.     Recommendations:   1) D/c Glucerna as Pt is not drinking- RD to provide Mighty Shakes and Magic Cup  2) Rx MVI daily   3) Bowel regimen PRN  4) Nutrition education as accepting  5) Monitor weights daily for trend/accuracy     Monitoring and Evaluation:   x ] PO intake [x ] Tolerance to diet prescription [X] Weights  [X] Follow up per protocol [X] Labs:

## 2025-03-13 NOTE — PROGRESS NOTE ADULT - SUBJECTIVE AND OBJECTIVE BOX
INTERVAL HPI/OVERNIGHT EVENTS:  follow up diabetes management    ROS: c/o some back pain this am. BG elevated at lunch yesterday, forgot to bolus for meal.    MEDICATIONS  (STANDING):  cefTRIAXone Injectable. 1000 milliGRAM(s) IV Push every 24 hours  cefTRIAXone Injectable.      dextrose 5%. 1000 milliLiter(s) (100 mL/Hr) IV Continuous <Continuous>  dextrose 5%. 1000 milliLiter(s) (50 mL/Hr) IV Continuous <Continuous>  dextrose 50% Injectable 25 Gram(s) IV Push once  dextrose 50% Injectable 12.5 Gram(s) IV Push once  dextrose 50% Injectable 25 Gram(s) IV Push once  enoxaparin Injectable 40 milliGRAM(s) SubCutaneous every 24 hours  glucagon  Injectable 1 milliGRAM(s) IntraMuscular once  insulin lispro (HumaLOG) Pump 1 Each SubCutaneous Continuous Pump  levETIRAcetam 1000 milliGRAM(s) Oral two times a day  melatonin 5 milliGRAM(s) Oral at bedtime  methocarbamol 500 milliGRAM(s) Oral three times a day  metoprolol tartrate 25 milliGRAM(s) Oral two times a day  midodrine. 10 milliGRAM(s) Oral three times a day  multivitamin 1 Tablet(s) Oral daily  rosuvastatin 5 milliGRAM(s) Oral at bedtime  sertraline 50 milliGRAM(s) Oral daily    MEDICATIONS  (PRN):  dextrose Oral Gel 15 Gram(s) Oral once PRN Blood Glucose LESS THAN 70 milliGRAM(s)/deciliter  LORazepam   Injectable 2 milliGRAM(s) IV Push once PRN Seizure Activity  oxyCODONE    IR 5 milliGRAM(s) Oral every 6 hours PRN Moderate Pain (4 - 6)  oxyCODONE    IR 10 milliGRAM(s) Oral every 6 hours PRN Severe Pain (7 - 10)      Allergies  No Known Allergies      Vital Signs Last 24 Hrs  T(C): 37.4 (13 Mar 2025 04:29), Max: 37.4 (13 Mar 2025 04:29)  T(F): 99.4 (13 Mar 2025 04:29), Max: 99.4 (13 Mar 2025 04:29)  HR: 87 (13 Mar 2025 09:03) (83 - 93)  BP: 160/95 (13 Mar 2025 09:03) (126/78 - 170/94)  BP(mean): --  RR: 18 (13 Mar 2025 04:29) (17 - 19)  SpO2: 99% (13 Mar 2025 04:29) (96% - 100%)    Parameters below as of 13 Mar 2025 04:29  Patient On (Oxygen Delivery Method): room air        PHYSICAL EXAM:  GENERAL: NAD, comfortable  NECK: Supple; trachea midline  CHEST/LUNG: Clear to auscultation bilaterally; No wheezes  HEART: Regular rate and rhythm  ABDOMEN: Soft, Nontender, Nondistended  NEURO: AAOx3  EXTREMITIES:  le ace wraps    LABS:                        10.9   7.77  )-----------( 272      ( 13 Mar 2025 06:19 )             33.7     03-13    141  |  102  |  11.5  ----------------------------<  129[H]  3.6   |  27.0  |  0.47[L]    Ca    9.1      13 Mar 2025 06:19  Phos  4.0     03-13  Mg     1.9     03-13      Urinalysis Basic - ( 13 Mar 2025 06:19 )    Color: x / Appearance: x / SG: x / pH: x  Gluc: 129 mg/dL / Ketone: x  / Bili: x / Urobili: x   Blood: x / Protein: x / Nitrite: x   Leuk Esterase: x / RBC: x / WBC x   Sq Epi: x / Non Sq Epi: x / Bacteria: x          POCT Blood Glucose.: 141 mg/dL (03-13-25 @ 08:54)        Triiodothyronine, Total (T3 Total): 83 ng/dL (03-08-25 @ 15:55)  Free Thyroxine, Serum: 1.2 ng/dL (03-08-25 @ 15:55)  Thyroid Stimulating Hormone, Serum: 1.11 uIU/mL (03-08-25 @ 15:55)

## 2025-03-13 NOTE — PROGRESS NOTE ADULT - SUBJECTIVE AND OBJECTIVE BOX
[facility]                                        [facility] [place]                                  [facility], [facility] You                                      [address]                                           [place], [place], [address]                                                [phone]        CC: seizure     HISTORY:  The patient is a 34y Female who reports was diagnosed with seizures this past year. She was admitted to [facility] after an episode. She had EEG and was started on seizure medication although she reports EEG did not show anything specific.   Now presents after further episode of seizure activity. (JW)    Interval history: no new neurological events, no further seizure activity    Review of systems (neurology): Denies headache or dizziness. Denies weakness/numbness.  Denies speech/language deficits. Denies diplopia/blurred vision.  Denies confusion    MEDICATIONS  (STANDING):  cefTRIAXone Injectable. 1000 milliGRAM(s) IV Push every 24 hours  cefTRIAXone Injectable.      dextrose 5%. 1000 milliLiter(s) (100 mL/Hr) IV Continuous <Continuous>  dextrose 5%. 1000 milliLiter(s) (50 mL/Hr) IV Continuous <Continuous>  dextrose 50% Injectable 25 Gram(s) IV Push once  dextrose 50% Injectable 12.5 Gram(s) IV Push once  dextrose 50% Injectable 25 Gram(s) IV Push once  enoxaparin Injectable 40 milliGRAM(s) SubCutaneous every 24 hours  glucagon  Injectable 1 milliGRAM(s) IntraMuscular once  insulin lispro (HumaLOG) Pump 1 Each SubCutaneous Continuous Pump  levETIRAcetam 1000 milliGRAM(s) Oral two times a day  melatonin 5 milliGRAM(s) Oral at bedtime  methocarbamol 500 milliGRAM(s) Oral three times a day  metoprolol tartrate 25 milliGRAM(s) Oral two times a day  midodrine. 10 milliGRAM(s) Oral three times a day  multivitamin 1 Tablet(s) Oral daily  rosuvastatin 5 milliGRAM(s) Oral at bedtime  sertraline 50 milliGRAM(s) Oral daily    MEDICATIONS  (PRN):  dextrose Oral Gel 15 Gram(s) Oral once PRN Blood Glucose LESS THAN 70 milliGRAM(s)/deciliter  LORazepam   Injectable 2 milliGRAM(s) IV Push once PRN Seizure Activity  oxyCODONE    IR 5 milliGRAM(s) Oral every 6 hours PRN Moderate Pain (4 - 6)  oxyCODONE    IR 10 milliGRAM(s) Oral every 6 hours PRN Severe Pain (7 - 10)      Vital Signs Last 24 Hrs  T(C): 37.4 (13 Mar 2025 04:29), Max: 37.4 (13 Mar 2025 04:29)  T(F): 99.4 (13 Mar 2025 04:29), Max: 99.4 (13 Mar 2025 04:29)  HR: 87 (13 Mar 2025 09:03) (83 - 93)  BP: 160/95 (13 Mar 2025 09:03) (126/78 - 170/94)  BP(mean): --  RR: 18 (13 Mar 2025 04:29) (17 - 19)  SpO2: 99% (13 Mar 2025 04:29) (96% - 100%)    Parameters below as of 13 Mar 2025 04:29  Patient On (Oxygen Delivery Method): room air      Neuro exam:    Mental status: The patient is awake, alert, and fully oriented. There is no aphasia. Attention span is normal.    Cranial nerves: Pupils react symmetrically to light. There is no visual field deficit to confrontation. Extraocular motion is full with no nystagmus.  Facial sensation is intact. Facial musculature is symmetric. Palate elevates symmetrically. Tongue is midline.    Motor: There is normal bulk and tone.  Strength is 5/5 in the right arm and leg.   Strength is 5/5 in the left arm and leg.    Sensation: Intact to light touch and pin.     Cerebellar: There is no dysmetria on finger to nose testing.    LABS:                         10.9   7.77  )-----------( 272      ( 13 Mar 2025 06:19 )             33.7     03-13    141  |  102  |  11.5  ----------------------------<  129[H]  3.6   |  27.0  |  0.47[L]    Ca    9.1      13 Mar 2025 06:19  Phos  4.0     03-13  Mg     1.9     03-13      ----------------------------------------------------------------------------------------------------------   EEG CLASSIFICATION 3/10-3/11/25:   Normal EEG in the awake, drowsy and asleep states.   No focal or epileptiform abnormalities.   No seizures.   -----------------------------------------------------------------------------------------------------------   IMPRESSION/CLINICAL CORRELATE:   Normal video-EEG.   No epileptiform abnormalities or seizures were recorded.    EEG CLASSIFICATION 3/9-3/10/25:   Normal EEG in the awake, drowsy and asleep states.   No focal or epileptiform abnormalities.   No seizures.   -----------------------------------------------------------------------------------------------------------   IMPRESSION/CLINICAL CORRELATE:   Normal video-EEG.   No epileptiform abnormalities or seizures were recorded.     RADIOLOGY   US Duplex Carotid Arteries Complete, Bilateral (03.10.25 @ 22:17)   IMPRESSION: No significant hemodynamic stenosis of either carotid artery.    CT Head No Cont (03.08.25 @ 16:31)   IMPRESSION:  No acute intracranial bleeding.  Enlarged nasopharyngeal soft tissue, greater than expected for enlarged   adenoids for patient's age. Consider direct inspection.

## 2025-03-13 NOTE — PROGRESS NOTE ADULT - NS ATTEND AMEND GEN_ALL_CORE FT
Patient seen and examined by me.  Chaperone: Ramin Chaudhari NP    I have discussed with Dr Renny Cohen  Patient to be off Toprol, because of orthostatic hypotension, it can be resumed as outpatient when she follows with her primary cardiolgoist    I have discussed my recommendation with the PA which are outlined above.  Will sign off

## 2025-03-13 NOTE — PROGRESS NOTE ADULT - ASSESSMENT
Patient is a 34 year old female who is being managed as an inpatient for breakthrough seizure and syncope. She has a past medical history of type I DM on insulin pump, TIA, seizure disorder. Prior to hospitalization, she was recently diagnosed with new onset CHF without a clear etiology, and she has been in and out of the hospital since November. She has not fully recovered and has been having increased difficulty walking. Patient is being managed as an inpatient for breakthrough seizure requiring vEEG.     Syncope  Orthostatic hypotension  Heart failure with reduced ejection fracture  - CT head negative on 3/8 and 3/10  - TTE showing LVEF is mildly decreased at 46%, global left ventricular hypokinesis  - Carotid doppler was unremarkable  - Alert and oriented x3, answers questions and engages in conversation  - Likely due to uncontrolled DM which caused autonomic dysfunction  - Discontinued metoprolol  - Continue fall precautions  - Continue compression stockings and abdominal binder (needs appropriate size)  - Continue midodrine 10mg TID PRN  - Follow up Lyme disease Ab  - Cardiology and Neurology following    Breakthrough seizure  Leukocytosis likely related to seizure, resolved  - s/p Keppra 1000mg IV once  - White count normalized, afebrile  - CT head negative on 3/8 and 3/10  - vEEG showing no new seizures  - Continue seizure precautions  - Continue Keppra 1000mg PO BID  - Neurology following     Type I DM  - A1c level 7  - Patient has insulin pump  - C-peptide 0.8 with glucose 108/JOSE and islet cell ab pending  - Endocrinology following and managing    Urinary tract infection  - Continue Rocephin for total 3 days    MDD  - Continue Zoloft    HLD  - Continue Crestor      DVT/GI ppx: Lovenox  Diet: Carb consistent  Code Status: Full code  Dispo: Pending clinical course, likely will require AR

## 2025-03-13 NOTE — PROGRESS NOTE ADULT - SUBJECTIVE AND OBJECTIVE BOX
[facility] Division of Hospital Medicine  Renny Cohen, DO  I'm reachable on [employer]    Patient is a 34y old  Female who presents with a chief complaint of Breakthrough seizures (12 Mar 2025 10:16)      SUBJECTIVE / OVERNIGHT EVENTS:  Patient seen and examined this morning. She denies chest pain, shortness of breath, abdominal pain, or any other acute symptoms at the time. She continues to endorse dizziness whenever she has any positional changes. Abdominal binder was placed and trials of mobilization will take place. Physiatry recommending AR.    MEDICATIONS  (STANDING):  cefTRIAXone Injectable. 1000 milliGRAM(s) IV Push every 24 hours  cefTRIAXone Injectable.      dextrose 5%. 1000 milliLiter(s) (50 mL/Hr) IV Continuous <Continuous>  dextrose 5%. 1000 milliLiter(s) (100 mL/Hr) IV Continuous <Continuous>  dextrose 50% Injectable 25 Gram(s) IV Push once  dextrose 50% Injectable 25 Gram(s) IV Push once  dextrose 50% Injectable 12.5 Gram(s) IV Push once  enoxaparin Injectable 40 milliGRAM(s) SubCutaneous every 24 hours  glucagon  Injectable 1 milliGRAM(s) IntraMuscular once  insulin lispro (HumaLOG) Pump 1 Each SubCutaneous Continuous Pump  levETIRAcetam 1000 milliGRAM(s) Oral two times a day  melatonin 5 milliGRAM(s) Oral at bedtime  methocarbamol 500 milliGRAM(s) Oral three times a day  metoprolol tartrate 25 milliGRAM(s) Oral two times a day  midodrine. 10 milliGRAM(s) Oral three times a day  multivitamin 1 Tablet(s) Oral daily  rosuvastatin 5 milliGRAM(s) Oral at bedtime  sertraline 50 milliGRAM(s) Oral daily    MEDICATIONS  (PRN):  dextrose Oral Gel 15 Gram(s) Oral once PRN Blood Glucose LESS THAN 70 milliGRAM(s)/deciliter  LORazepam   Injectable 2 milliGRAM(s) IV Push once PRN Seizure Activity  oxyCODONE    IR 5 milliGRAM(s) Oral every 6 hours PRN Moderate Pain (4 - 6)  oxyCODONE    IR 10 milliGRAM(s) Oral every 6 hours PRN Severe Pain (7 - 10)    CAPILLARY BLOOD GLUCOSE      POCT Blood Glucose.: 95 mg/dL (11 Mar 2025 21:57)    I&O's Summary      PHYSICAL EXAM:  Vital Signs Last 24 Hrs  T(C): 36.5 (12 Mar 2025 03:20), Max: 36.8 (11 Mar 2025 21:30)  T(F): 97.7 (12 Mar 2025 03:20), Max: 98.3 (11 Mar 2025 21:30)  HR: 100 (12 Mar 2025 06:30) (92 - 100)  BP: 147/82 (12 Mar 2025 06:30) (106/73 - 162/96)  BP(mean): --  RR: 18 (12 Mar 2025 03:20) (18 - 18)  SpO2: 100% (12 Mar 2025 03:20) (98% - 100%)    Parameters below as of 12 Mar 2025 03:20  Patient On (Oxygen Delivery Method): room air        CONSTITUTIONAL: NAD, well-developed, well-groomed  EYES:  EOMI, conjunctiva and sclera clear  ENMT: Moist oral mucosa  NECK: Supple, no JVD  RESPIRATORY: Normal respiratory effort; lungs are clear to auscultation bilaterally  CARDIOVASCULAR: Regular rate and rhythm, normal S1 and S2, no murmur/rub/gallop; No lower extremity edema  ABDOMEN: normoactive bowel sounds, soft, nontender to palpation, no distension   MUSCULOSKELETAL:  no clubbing or cyanosis of digits; no joint swelling or tenderness to palpation  PSYCH: A+O x3; affect appropriate, calm and cooperative  NEUROLOGY: CN 2-12 are intact and symmetric; no gross sensory deficits     LABS:                        10.4   7.99  )-----------( 266      ( 12 Mar 2025 06:38 )             32.0     03-12    143  |  105  |  10.2  ----------------------------<  131[H]  3.6   |  25.0  |  0.47[L]    Ca    9.0      12 Mar 2025 06:38  Phos  3.6     03-12  Mg     1.8     03-12            Urinalysis Basic - ( 12 Mar 2025 06:38 )    Color: x / Appearance: x / SG: x / pH: x  Gluc: 131 mg/dL / Ketone: x  / Bili: x / Urobili: x   Blood: x / Protein: x / Nitrite: x   Leuk Esterase: x / RBC: x / WBC x   Sq Epi: x / Non Sq Epi: x / Bacteria: x        Culture - Urine (collected 10 Mar 2025 17:00)  Source: Clean Catch Clean Catch (Midstream)  Preliminary Report (12 Mar 2025 04:50):    >100,000 CFU/ml Klebsiella pneumoniae

## 2025-03-13 NOTE — PROGRESS NOTE ADULT - ASSESSMENT
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 Admitted for seizures. Of note, patient missed about 1-2 weeks of keppra due to meds not being renewed and only restarted on keppra recently . Consult for diabetes mgmt, a1c 7.    1. Moderately controlled T1DM  - BG at target, was elevated at lunch yesterday but patient forgot to bolus  - Must be checking FS TID AC and QHS in hospital  - On insulin pump, may continue to use, orders in place  - Pump and sensor changed on 3/11 as per patient, has extra supplies at bedside  - Diabetic diet  - If patient is off insulin pump for any reason, start lantus 4 units with low dose sliding scale with meals  - C-peptide 0.8 with glucose 108/JOSE and islet cell ab pending    2. Seizures  - On EEG, care per neuro/primary team  - On Keppra     3. Syncope/autonomic dysfunction  - Symptomatic hypotension, on midodrine  - TTE showed reduced EF with diastolic dysfunction    4. UTI  - Abx management per primary team

## 2025-03-13 NOTE — PROGRESS NOTE ADULT - SUBJECTIVE AND OBJECTIVE BOX
[facility]                                                         CARDIOLOGY AT [address], [place] [address]                                                         Telephone: [phone]. Fax:[phone]                                                                             PROGRESS NOTE    Reason for follow up: HFrEF, Orthostatic hypotensions, Supine hypertension   Update: Patient required midodrine overnight and this morning, Metoprolol was placed yesterday.       Review of symptoms:   Cardiac:  No chest pain. No dyspnea. No palpitations.  Respiratory: no cough. No dyspnea  Gastrointestinal: No diarrhea. No abdominal pain. No bleeding.   Neuro: No focal neuro complaints.    Vitals:  T(C): 37.4 (03-13-25 @ 04:29), Max: 37.4 (03-13-25 @ 04:29)  HR: 87 (03-13-25 @ 09:03) (83 - 92)  BP: 160/95 (03-13-25 @ 09:03) (129/85 - 170/94)  RR: 18 (03-13-25 @ 04:29) (17 - 19)  SpO2: 99% (03-13-25 @ 04:29) (96% - 100%)  Wt(kg): --  I&O's Summary    12 Mar 2025 07:01  -  13 Mar 2025 07:00  --------------------------------------------------------  IN: 1300 mL / OUT: 0 mL / NET: 1300 mL      Weight (kg): 48 (03-08 @ 14:33)    PHYSICAL EXAM:  Appearance: Comfortable. No acute distress  HEENT:  Atraumatic. Normocephalic.  Normal oral mucosa  Neurologic: A & O x 3, no gross focal deficits.  Cardiovascular: RRR S1 S2, No murmur, no rubs/gallops. No JVD  Respiratory: Lungs clear to auscultation, unlabored   Gastrointestinal:  Soft, Non-tender, + BS  Lower Extremities: 2+ Peripheral Pulses, No clubbing, cyanosis, or edema  Psychiatry: Patient is calm. No agitation.   Skin: warm and dry.    CURRENT CARDIAC MEDICATIONS:  metoprolol tartrate 25 milliGRAM(s) Oral two times a day  midodrine. 10 milliGRAM(s) Oral three times a day      CURRENT OTHER MEDICATIONS:  cefTRIAXone Injectable. 1000 milliGRAM(s) IV Push every 24 hours  cefTRIAXone Injectable.      levETIRAcetam 1000 milliGRAM(s) Oral two times a day  LORazepam   Injectable 2 milliGRAM(s) IV Push once PRN Seizure Activity  melatonin 5 milliGRAM(s) Oral at bedtime  methocarbamol 500 milliGRAM(s) Oral three times a day  oxyCODONE    IR 5 milliGRAM(s) Oral every 6 hours PRN Moderate Pain (4 - 6)  oxyCODONE    IR 10 milliGRAM(s) Oral every 6 hours PRN Severe Pain (7 - 10)  sertraline 50 milliGRAM(s) Oral daily  dextrose 50% Injectable 25 Gram(s) IV Push once, Stop order after: 1 Doses  dextrose 50% Injectable 12.5 Gram(s) IV Push once, Stop order after: 1 Doses  dextrose 50% Injectable 25 Gram(s) IV Push once, Stop order after: 1 Doses  dextrose Oral Gel 15 Gram(s) Oral once, Stop order after: 1 Doses PRN Blood Glucose LESS THAN 70 milliGRAM(s)/deciliter  glucagon  Injectable 1 milliGRAM(s) IntraMuscular once, Stop order after: 1 Doses  insulin lispro (HumaLOG) Pump 1 Each SubCutaneous Continuous Pump  rosuvastatin 5 milliGRAM(s) Oral at bedtime  dextrose 5%. 1000 milliLiter(s) (50 mL/Hr) IV Continuous <Continuous>  dextrose 5%. 1000 milliLiter(s) (100 mL/Hr) IV Continuous <Continuous>  enoxaparin Injectable 40 milliGRAM(s) SubCutaneous every 24 hours  multivitamin 1 Tablet(s) Oral daily      LABS:	 	                            10.9   7.77  )-----------( 272      ( 13 Mar 2025 06:19 )             33.7     03-13    141  |  102  |  11.5  ----------------------------<  129[H]  3.6   |  27.0  |  0.47[L]    Ca    9.1      13 Mar 2025 06:19  Phos  4.0     03-13  Mg     1.9     03-13        Lipid Profile:   HgA1c:   TSH: Thyroid Stimulating Hormone, Serum: 1.11 uIU/mL    TELEMETRY: Desat  ECG: NS    DIAGNOSTIC TESTING:  [ ] Echocardiogram:   3/10  < from: TTE W or WO Ultrasound Enhancing Agent (03.10.25 @ 12:38) >   1. Left atrium is normal in size.   2. Left ventricular systolic function is mildly decreased with an ejection fraction of 46 % by Grey's method of disks. Global left ventricular hypokinesis.   3. Left ventricular global longitudinal strain is -11.0 % is abnormal (> -16%).   4. There is mild (grade 1) left ventricular diastolic dysfunction.   5. The right atrium is normal in size.   6. Normal right ventricular cavity size and normal right ventricular systolic function.   7. No significant valvular disease.   8. No pericardial effusion seen.   9. 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      Consider Cardiac CT angiography to assess for anomalous coronary artery if clinically indicated.    < end of copied text >    2/24/25 Good Tyson   Conclusions (see detailed report below) Summary: 1. Left ventricle: The cavity size is normal. Normal thickness is present. There are no regional wall motion abnormalities present. The left ventricular ejection fraction is normal. The LVEF was calculated by 2D Grey's method. Left ventricular ejection fraction is 55-60%. The left ventricular filling pattern is abnormal from impaired relaxation. 2. Right ventricle: The cavity size is normal. Right ventricular systolic function is normal. 3. Left atrium: There is no evidence of a patent foramen ovale by color Doppler interrogation of the interatrial septum. 4. Mitral valve: The annulus is mildly calcified. The leaflets are mildly thickened. There is no evidence of mitral valve stenosis. There is trace mitral valve regurgitation. 5. Aortic valve: The valve is trileaflet. The leaflets are mildly thickened. There is no aortic stenosis. There is no valvular aortic regurgitation. 6. Tricuspid valve: The leaflets are mildly thickened. There is no tricuspid stenosis. There is trace tricuspid valve regurgitation. 7. Pericardium, extracardiac: There is no pericardial effusion. Findings Left ventricle: The cavity size is normal. Normal thickness is present. There are no regional wall motion abnormalities present. The left ventricular ejection fraction is normal. The LVEF was calculated by 2D Grey's method. Left ventricular ejection fraction is 55-60%. The left ventricular filling pattern is abnormal from impaired relaxation. Left atrium: The cavity size is normal in size. There is no evidence of a patent foramen ovale by color Doppler interrogation of the interatrial septum. Right ventricle: The cavity size is normal. Right ventricular systolic function is normal. Right atrium: The cavity size is normal in size. Aortic valve: The valve is trileaflet. The leaflets are mildly thickened. There is no aortic stenosis. There is no valvular aortic regurgitation. Mitral valve: The annulus is mildly calcified. The leaflets are mildly thickened. There is no evidence of mitral valve stenosis. There is trace mitral valve regurgitation. Tricuspid valve: The leaflets are mildly thickened. There is no tricuspid stenosis. There is trace tricuspid valve Nilda Mccord ([id number]) - 2/26/2025 Page 1 / 2 regurgitation. Pulmonic valve: The leaflets are normal thickness. There is no evidence of stenosis. There is no pulmonic regurgitation. Aorta: The aortic root size is normal. The ascending aorta size is normal. The aortic arch size was not well visualized. The descending aorta size is normal. Systemic veins: The inferior vena cava is normal in size. The inferior vena cava respirophasic diameter changes are in the normal range (= 50%). Pericardium: There is no pericardial effusion. Measurements Left ventricle Value 12/24/2024 Ref FAM (L) 3.4 cm 4.2 3.8 - 5.2 ESD 2.2 cm 2.9 2.2 - 3.5 IVS, ED (H) 1.0 cm -- 0.6 - 0.9 PW, ED (H) 1.1 cm 0.9 0.6 - 0.9 EDV 47 ml 79 46 - 106 ESV 16 ml 32 14 - 42 EF, 2-p 60.6% 57.8 54.0 - 74.0 E', lat radha, TDI 11.2 cm/sec 9.9 =10.0 E/e', lat radha, TDI 8.00 9.40 ---- A', lat radha, TDI 9.7 cm/sec 15.1 ---- E'/a', lat radha, TDI 1.16 0.66 ---- E', med radha, TDI (L) 6.9 cm/sec 8.6 =7.0 E/e', med radha, TDI 13.00 10.80 ---- A', med radha, TDI 11.1 cm/sec 12.7 ---- E'/a', med radha, TDI 0.62 0.68 ---- E/e', avg, TDI 10 16 =14 LVOT Value 12/24/2024 Ref Diam, S 1.9 cm 1.8 ---- Area 2.84 cm² 2.54 ---- Peak ash, S 99.9 cm/sec 89.9 ---- VTI, S 17 cm 17 -    [ ]  Catheterization:  11/2024 at [facility]  Coronary Findings Diagnostic Dominance: Right  Left Main: The vessel exhibits minimal luminal irregularities.  Left Anterior Descending: The vessel exhibits minimal luminal irregularities.  Left Circumflex: The vessel exhibits minimal luminal irregularities.  Right Coronary Artery: The vessel exhibits minimal luminal irregularities.  Intervention  No interventions have been documented.      [ ] Stress Test:    OTHER: 	        	                                                                [facility]                                                         CARDIOLOGY AT [facility]                                                                  [address], [place] [address]                                                         Telephone: [phone]. Fax:[phone]                                                                             PROGRESS NOTE    Reason for follow up: HFrEF, Orthostatic hypotensions, Supine hypertension   Update: Patient required midodrine overnight and this morning however BP elevated, Metoprolol was placed yesterday.   Patient sitting in chair     Review of symptoms:   Cardiac:  No chest pain. No dyspnea. No palpitations.  Respiratory: no cough. No dyspnea  Gastrointestinal: No diarrhea. No abdominal pain. No bleeding.   Neuro: No focal neuro complaints.    Vitals:  T(C): 37.4 (03-13-25 @ 04:29), Max: 37.4 (03-13-25 @ 04:29)  HR: 87 (03-13-25 @ 09:03) (83 - 92)  BP: 160/95 (03-13-25 @ 09:03) (129/85 - 170/94)  RR: 18 (03-13-25 @ 04:29) (17 - 19)  SpO2: 99% (03-13-25 @ 04:29) (96% - 100%)  Wt(kg): --  I&O's Summary    12 Mar 2025 07:01  -  13 Mar 2025 07:00  --------------------------------------------------------  IN: 1300 mL / OUT: 0 mL / NET: 1300 mL      Weight (kg): 48 (03-08 @ 14:33)    PHYSICAL EXAM:  Appearance: Comfortable. No acute distress, sitting in chair  HEENT:  Atraumatic. Normocephalic.  Normal oral mucosa  Neurologic: A & O x 3, no gross focal deficits.  Cardiovascular: RRR S1 S2, No murmur, no rubs/gallops. No JVD  Respiratory: Lungs clear to auscultation, unlabored   Gastrointestinal:  Soft, Non-tender, + BS  Lower Extremities: 2+ Peripheral Pulses, No clubbing, cyanosis, or edema. b/l LE wrapped.   Psychiatry: Patient is calm. No agitation.   Skin: warm and dry.    CURRENT CARDIAC MEDICATIONS:  metoprolol tartrate 25 milliGRAM(s) Oral two times a day  midodrine. 10 milliGRAM(s) Oral three times a day      CURRENT OTHER MEDICATIONS:  cefTRIAXone Injectable. 1000 milliGRAM(s) IV Push every 24 hours  cefTRIAXone Injectable.      levETIRAcetam 1000 milliGRAM(s) Oral two times a day  LORazepam   Injectable 2 milliGRAM(s) IV Push once PRN Seizure Activity  melatonin 5 milliGRAM(s) Oral at bedtime  methocarbamol 500 milliGRAM(s) Oral three times a day  oxyCODONE    IR 5 milliGRAM(s) Oral every 6 hours PRN Moderate Pain (4 - 6)  oxyCODONE    IR 10 milliGRAM(s) Oral every 6 hours PRN Severe Pain (7 - 10)  sertraline 50 milliGRAM(s) Oral daily  dextrose 50% Injectable 25 Gram(s) IV Push once, Stop order after: 1 Doses  dextrose 50% Injectable 12.5 Gram(s) IV Push once, Stop order after: 1 Doses  dextrose 50% Injectable 25 Gram(s) IV Push once, Stop order after: 1 Doses  dextrose Oral Gel 15 Gram(s) Oral once, Stop order after: 1 Doses PRN Blood Glucose LESS THAN 70 milliGRAM(s)/deciliter  glucagon  Injectable 1 milliGRAM(s) IntraMuscular once, Stop order after: 1 Doses  insulin lispro (HumaLOG) Pump 1 Each SubCutaneous Continuous Pump  rosuvastatin 5 milliGRAM(s) Oral at bedtime  dextrose 5%. 1000 milliLiter(s) (50 mL/Hr) IV Continuous <Continuous>  dextrose 5%. 1000 milliLiter(s) (100 mL/Hr) IV Continuous <Continuous>  enoxaparin Injectable 40 milliGRAM(s) SubCutaneous every 24 hours  multivitamin 1 Tablet(s) Oral daily      LABS:	 	                            10.9   7.77  )-----------( 272      ( 13 Mar 2025 06:19 )             33.7     03-13    141  |  102  |  11.5  ----------------------------<  129[H]  3.6   |  27.0  |  0.47[L]    Ca    9.1      13 Mar 2025 06:19  Phos  4.0     03-13  Mg     1.9     03-13        Lipid Profile:   HgA1c:   TSH: Thyroid Stimulating Hormone, Serum: 1.11 uIU/mL    TELEMETRY: Desat  ECG: NS    DIAGNOSTIC TESTING:  [ ] Echocardiogram:   3/10  < from: TTE W or WO Ultrasound Enhancing Agent (03.10.25 @ 12:38) >   1. Left atrium is normal in size.   2. Left ventricular systolic function is mildly decreased with an ejection fraction of 46 % by Grey's method of disks. Global left ventricular hypokinesis.   3. Left ventricular global longitudinal strain is -11.0 % is abnormal (> -16%).   4. There is mild (grade 1) left ventricular diastolic dysfunction.   5. The right atrium is normal in size.   6. Normal right ventricular cavity size and normal right ventricular systolic function.   7. No significant valvular disease.   8. No pericardial effusion seen.   9. 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      Consider Cardiac CT angiography to assess for anomalous coronary artery if clinically indicated.    < end of copied text >    2/24/25 Good [facility]   Conclusions (see detailed report below) Summary: 1. Left ventricle: The cavity size is normal. Normal thickness is present. There are no regional wall motion abnormalities present. The left ventricular ejection fraction is normal. The LVEF was calculated by 2D Grey's method. Left ventricular ejection fraction is 55-60%. The left ventricular filling pattern is abnormal from impaired relaxation. 2. Right ventricle: The cavity size is normal. Right ventricular systolic function is normal. 3. Left atrium: There is no evidence of a patent foramen ovale by color Doppler interrogation of the interatrial septum. 4. Mitral valve: The annulus is mildly calcified. The leaflets are mildly thickened. There is no evidence of mitral valve stenosis. There is trace mitral valve regurgitation. 5. Aortic valve: The valve is trileaflet. The leaflets are mildly thickened. There is no aortic stenosis. There is no valvular aortic regurgitation. 6. Tricuspid valve: The leaflets are mildly thickened. There is no tricuspid stenosis. There is trace tricuspid valve regurgitation. 7. Pericardium, extracardiac: There is no pericardial effusion. Findings Left ventricle: The cavity size is normal. Normal thickness is present. There are no regional wall motion abnormalities present. The left ventricular ejection fraction is normal. The LVEF was calculated by 2D Grey's method. Left ventricular ejection fraction is 55-60%. The left ventricular filling pattern is abnormal from impaired relaxation. Left atrium: The cavity size is normal in size. There is no evidence of a patent foramen ovale by color Doppler interrogation of the interatrial septum. Right ventricle: The cavity size is normal. Right ventricular systolic function is normal. Right atrium: The cavity size is normal in size. Aortic valve: The valve is trileaflet. The leaflets are mildly thickened. There is no aortic stenosis. There is no valvular aortic regurgitation. Mitral valve: The annulus is mildly calcified. The leaflets are mildly thickened. There is no evidence of mitral valve stenosis. There is trace mitral valve regurgitation. Tricuspid valve: The leaflets are mildly thickened. There is no tricuspid stenosis. There is trace tricuspid valve Nilda Mccord ([id number]) - 2/26/2025 Page 1 / 2 regurgitation. Pulmonic valve: The leaflets are normal thickness. There is no evidence of stenosis. There is no pulmonic regurgitation. Aorta: The aortic root size is normal. The ascending aorta size is normal. The aortic arch size was not well visualized. The descending aorta size is normal. Systemic veins: The inferior vena cava is normal in size. The inferior vena cava respirophasic diameter changes are in the normal range (= 50%). Pericardium: There is no pericardial effusion. Measurements Left ventricle Value 12/24/2024 Ref FAM (L) 3.4 cm 4.2 3.8 - 5.2 ESD 2.2 cm 2.9 2.2 - 3.5 IVS, ED (H) 1.0 cm -- 0.6 - 0.9 PW, ED (H) 1.1 cm 0.9 0.6 - 0.9 EDV 47 ml 79 46 - 106 ESV 16 ml 32 14 - 42 EF, 2-p 60.6% 57.8 54.0 - 74.0 E', lat radha, TDI 11.2 cm/sec 9.9 =10.0 E/e', lat radha, TDI 8.00 9.40 ---- A', lat radha, TDI 9.7 cm/sec 15.1 ---- E'/a', lat radha, TDI 1.16 0.66 ---- E', med radha, TDI (L) 6.9 cm/sec 8.6 =7.0 E/e', med radha, TDI 13.00 10.80 ---- A', med radha, TDI 11.1 cm/sec 12.7 ---- E'/a', med radha, TDI 0.62 0.68 ---- E/e', avg, TDI 10 16 =14 LVOT Value 12/24/2024 Ref Diam, S 1.9 cm 1.8 ---- Area 2.84 cm² 2.54 ---- Peak ash, S 99.9 cm/sec 89.9 ---- VTI, S 17 cm 17 -    [ ]  Catheterization:  11/2024 at [facility]  Coronary Findings Diagnostic Dominance: Right  Left Main: The vessel exhibits minimal luminal irregularities.  Left Anterior Descending: The vessel exhibits minimal luminal irregularities.  Left Circumflex: The vessel exhibits minimal luminal irregularities.  Right Coronary Artery: The vessel exhibits minimal luminal irregularities.  Intervention  No interventions have been documented.      [ ] Stress Test:    OTHER:

## 2025-03-13 NOTE — CONSULT NOTE ADULT - REASON FOR ADMISSION
Breakthrough seizures
Statement Selected

## 2025-03-14 LAB
GLUCOSE BLDC GLUCOMTR-MCNC: 115 MG/DL — HIGH (ref 70–99)
GLUCOSE BLDC GLUCOMTR-MCNC: 185 MG/DL — HIGH (ref 70–99)
GLUCOSE BLDC GLUCOMTR-MCNC: 271 MG/DL — HIGH (ref 70–99)
ISLET CELL512 AB SER-ACNC: SIGNIFICANT CHANGE UP

## 2025-03-14 PROCEDURE — 99232 SBSQ HOSP IP/OBS MODERATE 35: CPT

## 2025-03-14 RX ORDER — ACETAMINOPHEN 500 MG/5ML
725 LIQUID (ML) ORAL ONCE
Refills: 0 | Status: COMPLETED | OUTPATIENT
Start: 2025-03-14 | End: 2025-03-14

## 2025-03-14 RX ORDER — LIDOCAINE HYDROCHLORIDE 20 MG/ML
2 JELLY TOPICAL EVERY 24 HOURS
Refills: 0 | Status: DISCONTINUED | OUTPATIENT
Start: 2025-03-14 | End: 2025-03-24

## 2025-03-14 RX ADMIN — MIDODRINE HYDROCHLORIDE 10 MILLIGRAM(S): 5 TABLET ORAL at 23:41

## 2025-03-14 RX ADMIN — LIDOCAINE HYDROCHLORIDE 2 PATCH: 20 JELLY TOPICAL at 18:10

## 2025-03-14 RX ADMIN — OXYCODONE HYDROCHLORIDE 10 MILLIGRAM(S): 30 TABLET ORAL at 02:57

## 2025-03-14 RX ADMIN — ROSUVASTATIN CALCIUM 5 MILLIGRAM(S): 5 TABLET, FILM COATED ORAL at 22:15

## 2025-03-14 RX ADMIN — Medication 5 MILLIGRAM(S): at 22:15

## 2025-03-14 RX ADMIN — OXYCODONE HYDROCHLORIDE 10 MILLIGRAM(S): 30 TABLET ORAL at 16:35

## 2025-03-14 RX ADMIN — LEVETIRACETAM 1000 MILLIGRAM(S): 10 INJECTION, SOLUTION INTRAVENOUS at 06:19

## 2025-03-14 RX ADMIN — OXYCODONE HYDROCHLORIDE 10 MILLIGRAM(S): 30 TABLET ORAL at 06:25

## 2025-03-14 RX ADMIN — METHOCARBAMOL 500 MILLIGRAM(S): 500 TABLET, FILM COATED ORAL at 06:19

## 2025-03-14 RX ADMIN — Medication 2 MILLIGRAM(S): at 18:10

## 2025-03-14 RX ADMIN — Medication 290 MILLIGRAM(S): at 12:31

## 2025-03-14 RX ADMIN — Medication 2 MILLIGRAM(S): at 19:02

## 2025-03-14 RX ADMIN — OXYCODONE HYDROCHLORIDE 10 MILLIGRAM(S): 30 TABLET ORAL at 16:12

## 2025-03-14 RX ADMIN — LEVETIRACETAM 1000 MILLIGRAM(S): 10 INJECTION, SOLUTION INTRAVENOUS at 17:02

## 2025-03-14 RX ADMIN — Medication 725 MILLIGRAM(S): at 13:29

## 2025-03-14 RX ADMIN — METHOCARBAMOL 500 MILLIGRAM(S): 500 TABLET, FILM COATED ORAL at 12:31

## 2025-03-14 RX ADMIN — Medication 1 TABLET(S): at 10:49

## 2025-03-14 RX ADMIN — OXYCODONE HYDROCHLORIDE 10 MILLIGRAM(S): 30 TABLET ORAL at 23:28

## 2025-03-14 RX ADMIN — ENOXAPARIN SODIUM 40 MILLIGRAM(S): 100 INJECTION SUBCUTANEOUS at 23:19

## 2025-03-14 RX ADMIN — METHOCARBAMOL 500 MILLIGRAM(S): 500 TABLET, FILM COATED ORAL at 22:15

## 2025-03-14 RX ADMIN — OXYCODONE HYDROCHLORIDE 10 MILLIGRAM(S): 30 TABLET ORAL at 22:15

## 2025-03-14 RX ADMIN — SERTRALINE 50 MILLIGRAM(S): 100 TABLET, FILM COATED ORAL at 10:48

## 2025-03-14 NOTE — PROGRESS NOTE ADULT - ASSESSMENT
Patient is a 34 year old female who is being managed as an inpatient for breakthrough seizure and syncope. She has a past medical history of type I DM on insulin pump, TIA, seizure disorder. Prior to hospitalization, she was recently diagnosed with new onset CHF without a clear etiology, and she has been in and out of the hospital since November. She has not fully recovered and has been having increased difficulty walking. Patient is being managed as an inpatient for breakthrough seizure requiring vEEG.     Syncope  Orthostatic hypotension likely secondary to autonomic dysfunction  Heart failure with reduced ejection fracture  - CT head negative on 3/8 and 3/10  - TTE showing LVEF is mildly decreased at 46%, global left ventricular hypokinesis  - Carotid doppler was unremarkable; Lyme serology negative  - Alert and oriented x3, answers questions and engages in conversation  - Likely due to uncontrolled DM which caused autonomic dysfunction  - Discontinued metoprolol  - Continue fall precautions  - Continue compression stockings and abdominal binder (needs appropriate size)  - Continue midodrine 10mg TID PRN  - Follow up B12 and RPR to explore possible other causes of neuropathy  - Cardiology and Neurology following    Breakthrough seizure  Leukocytosis likely related to seizure, resolved  - s/p Keppra 1000mg IV once  - White count normalized, afebrile  - CT head negative on 3/8 and 3/10  - vEEG showing no new seizures  - Continue seizure precautions  - Continue Keppra 1000mg PO BID  - Neurology following     Type I DM  - A1c level 7  - Patient has insulin pump  - C-peptide 0.8 with glucose 108/JOSE and islet cell ab pending  - Endocrinology following and managing    Urinary tract infection  - Continue Rocephin for total 3 days    MDD  - Continue Zoloft    HLD  - Continue Crestor      DVT/GI ppx: Lovenox  Diet: Carb consistent  Code Status: Full code  Dispo: Pending clinical course, likely will require AR

## 2025-03-14 NOTE — PROGRESS NOTE ADULT - SUBJECTIVE AND OBJECTIVE BOX
INTERVAL EVENTS:  Follow up diabetes management. On insulin pump, glucoses are acceptable.    MEDICATIONS  (STANDING):  dextrose 5%. 1000 milliLiter(s) (100 mL/Hr) IV Continuous <Continuous>  dextrose 5%. 1000 milliLiter(s) (50 mL/Hr) IV Continuous <Continuous>  dextrose 50% Injectable 25 Gram(s) IV Push once  dextrose 50% Injectable 12.5 Gram(s) IV Push once  dextrose 50% Injectable 25 Gram(s) IV Push once  enoxaparin Injectable 40 milliGRAM(s) SubCutaneous every 24 hours  glucagon  Injectable 1 milliGRAM(s) IntraMuscular once  insulin lispro (HumaLOG) Pump 1 Each SubCutaneous Continuous Pump  levETIRAcetam 1000 milliGRAM(s) Oral two times a day  melatonin 5 milliGRAM(s) Oral at bedtime  methocarbamol 500 milliGRAM(s) Oral three times a day  multivitamin 1 Tablet(s) Oral daily  rosuvastatin 5 milliGRAM(s) Oral at bedtime  sertraline 50 milliGRAM(s) Oral daily    MEDICATIONS  (PRN):  dextrose Oral Gel 15 Gram(s) Oral once PRN Blood Glucose LESS THAN 70 milliGRAM(s)/deciliter  LORazepam   Injectable 2 milliGRAM(s) IV Push once PRN Seizure Activity  midodrine. 10 milliGRAM(s) Oral three times a day PRN Supine SBP <110  oxyCODONE    IR 5 milliGRAM(s) Oral every 6 hours PRN Moderate Pain (4 - 6)  oxyCODONE    IR 10 milliGRAM(s) Oral every 6 hours PRN Severe Pain (7 - 10)    Allergies  No Known Allergies    Vital Signs Last 24 Hrs  T(C): 36.7 (14 Mar 2025 04:40), Max: 36.7 (13 Mar 2025 16:51)  T(F): 98.1 (14 Mar 2025 04:40), Max: 98.1 (13 Mar 2025 16:51)  HR: 89 (14 Mar 2025 04:40) (89 - 91)  BP: 156/94 (14 Mar 2025 04:40) (101/70 - 156/94)  BP(mean): --  RR: 18 (14 Mar 2025 04:40) (18 - 18)  SpO2: 98% (14 Mar 2025 04:40) (91% - 100%)    PHYSICAL EXAM:  General: No apparent distress  Respiratory: Lungs clear bilaterally  Cardiac: +S1, S2, no m/r/g  GI: +BS, soft, non tender, non distended  Extremities: No peripheral edema  Neuro: A+O X3     LABS:                        10.9   7.77  )-----------( 272      ( 13 Mar 2025 06:19 )             33.7     03-13    141  |  102  |  11.5  ----------------------------<  129[H]  3.6   |  27.0  |  0.47[L]    Ca    9.1      13 Mar 2025 06:19  Phos  4.0     03-13  Mg     1.9     03-13      Urinalysis Basic - ( 13 Mar 2025 06:19 )    Color: x / Appearance: x / SG: x / pH: x  Gluc: 129 mg/dL / Ketone: x  / Bili: x / Urobili: x   Blood: x / Protein: x / Nitrite: x   Leuk Esterase: x / RBC: x / WBC x   Sq Epi: x / Non Sq Epi: x / Bacteria: x    POCT Blood Glucose.: 219 mg/dL (03-13-25 @ 17:15)  POCT Blood Glucose.: 168 mg/dL (03-13-25 @ 11:54)    Triiodothyronine, Total (T3 Total): 83 ng/dL (03-08-25 @ 15:55)  Free Thyroxine, Serum: 1.2 ng/dL (03-08-25 @ 15:55)  Thyroid Stimulating Hormone, Serum: 1.11 uIU/mL (03-08-25 @ 15:55)

## 2025-03-14 NOTE — PROGRESS NOTE ADULT - SUBJECTIVE AND OBJECTIVE BOX
CC: Patient being seen for rehabilitation follow up.  Reports that she wanted to shower instead of working with PT.  As per PT, spent time with her boyfriend.  Spent some of her time in the chair.  Encouraged active ROM and strengthening exercises.     FUNCTIONAL PROGRESS  3/11 PT  Bed Mobility  Bed Mobility Training Sit-to-Supine: minimum assist (75% patient effort);  1 person assist  Bed Mobility Training Supine-to-Sit: supervsion;  stand-by assist  Bed Mobility Training Limitations: decreased strength    Sit-Stand Transfer Training  Transfer Training Sit-to-Stand Transfer: contact guard;  1 person assist;  rolling walker  Transfer Training Stand-to-Sit Transfer: minimum assist (75% patient effort);  1 person assist;  rolling walker  Sit-to-Stand Transfer Training Transfer Safety Analysis: decreased strength;  impaired balance    Gait Training  Gait Training: minimum assist (75% patient effort);  1 person assist;  rolling walker;  3 feet side stepping, pt orthostatic - BP 54/36  Gait Analysis: 3-point gait   impaired balance;  (+) orthostatics - documented by CNA in flow sheet      VITALS  T(C): 36.7 (03-14-25 @ 04:40), Max: 36.7 (03-13-25 @ 16:51)  HR: 89 (03-14-25 @ 04:40) (87 - 91)  BP: 156/94 (03-14-25 @ 04:40) (101/70 - 160/95)  RR: 18 (03-14-25 @ 04:40) (18 - 18)  SpO2: 98% (03-14-25 @ 04:40) (91% - 100%)  Wt(kg): --    MEDICATIONS   dextrose 5%. 1000 milliLiter(s) <Continuous>  dextrose 5%. 1000 milliLiter(s) <Continuous>  dextrose 50% Injectable 25 Gram(s) once  dextrose 50% Injectable 12.5 Gram(s) once  dextrose 50% Injectable 25 Gram(s) once  dextrose Oral Gel 15 Gram(s) once PRN  enoxaparin Injectable 40 milliGRAM(s) every 24 hours  glucagon  Injectable 1 milliGRAM(s) once  insulin lispro (HumaLOG) Pump 1 Each Continuous Pump  levETIRAcetam 1000 milliGRAM(s) two times a day  LORazepam   Injectable 2 milliGRAM(s) once PRN  melatonin 5 milliGRAM(s) at bedtime  methocarbamol 500 milliGRAM(s) three times a day  midodrine. 10 milliGRAM(s) three times a day PRN  multivitamin 1 Tablet(s) daily  oxyCODONE    IR 5 milliGRAM(s) every 6 hours PRN  oxyCODONE    IR 10 milliGRAM(s) every 6 hours PRN  rosuvastatin 5 milliGRAM(s) at bedtime  sertraline 50 milliGRAM(s) daily      RECENT LABS/IMAGING  - Reviewed Today                        10.9   7.77  )-----------( 272      ( 13 Mar 2025 06:19 )             33.7     03-13    141  |  102  |  11.5  ----------------------------<  129[H]  3.6   |  27.0  |  0.47[L]    Ca    9.1      13 Mar 2025 06:19  Phos  4.0     03-13  Mg     1.9     03-13        Urinalysis Basic - ( 13 Mar 2025 06:19 )    Color: x / Appearance: x / SG: x / pH: x  Gluc: 129 mg/dL / Ketone: x  / Bili: x / Urobili: x   Blood: x / Protein: x / Nitrite: x   Leuk Esterase: x / RBC: x / WBC x   Sq Epi: x / Non Sq Epi: x / Bacteria: x      HEAD CT 3/10 -  No acute intracranial hemorrhage, mass effect, or shift of the midline structures. Unchanged nonspecific adenoidal hypertrophy.    TTE 3/10 -  1. Left atrium is normal in size.   2. Left ventricular systolic function is mildly decreased with an ejection fraction of 46 % by Grey's method of disks. Global left ventricular hypokinesis.   3. Left ventricular global longitudinal strain is -11.0 % is abnormal (> -16%).   4. There is mild (grade 1) left ventricular diastolic dysfunction.   5. The right atrium is normal in size.   6. Normal right ventricular cavity size and normal right ventricular systolic function.   7. No significant valvular disease.   8. No pericardial effusion seen.   9. 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      Consider Cardiac CT angiography to assess for anomalous coronary artery if clinically indicated.          ----------------------------------------------------------------------------------------  PHYSICAL EXAM  Constitutional - NAD, Appears Comfortable   Extremities - No C/C/E, No calf tenderness   Neurologic Exam -                    Cognitive - AAO to self, place, date, year, situation     Communication - Fluent, No dysarthria     Cranial Nerves - CN 2-12 intact     FUNCTIONAL MOTOR EXAM - No focal deficits      Sensory - Intact to LT   Psychiatric - Mood stable, Affect WNL  ----------------------------------------------------------------------------------------  ASSESSMENT/PLAN  35yFemale with functional deficits after developing breakthrough seizures  Breakthrough Seizures - Keppra, Ativan   Syncope/Orthostatic Hypotension/HTN - Midodrine    Acute UTI - Rocephin   DM1 - Humalog Pump, ISS  CAD - Crestor  Mood D/O - Zoloft  Sleep - Melatonin   Pain - Tylenol, Oxycodone, Robaxin  DVT PPX - SCDs, Lovenox  Rehab/Impaired mobility and function - Patient continues to require hospitalization for the above diagnoses and ongoing active management of comorbid complications that are substantially posing a threat to bodily function, functional ability and quality of life.     RECOMMEND - OOB daily     When medically optimized, based on the patient's diagnosis, current functional status and potential for progress, recommend ACUTE inpatient rehabilitation for the functional deficits consisting of 3 hours of therapy/day & 24 hour RN/daily PMR physician for active comorbid medical management. Patient will be able to tolerate 3 hours a day.    Will need PT and OT within 48hrs [facility] admission.

## 2025-03-14 NOTE — PROGRESS NOTE ADULT - SUBJECTIVE AND OBJECTIVE BOX
[facility] Division of Hospital Medicine  Renny Cohen, DO  I'm reachable on [employer] Teams    Patient is a 34y old  Female who presents with a chief complaint of Breakthrough seizures (12 Mar 2025 10:16)      SUBJECTIVE / OVERNIGHT EVENTS:  Patient seen and examined this morning. She denies chest pain, shortness of breath, abdominal pain, or any other acute symptoms at the time. She continues to endorse dizziness whenever she has any positional changes. Abdominal binder was placed and trials of mobilization will take place. Physiatry recommending AR. Encouraged to move more and patient seems more motivated today since it was her birthday yesterday and she spent it inside the hospital.    MEDICATIONS  (STANDING):  cefTRIAXone Injectable. 1000 milliGRAM(s) IV Push every 24 hours  cefTRIAXone Injectable.      dextrose 5%. 1000 milliLiter(s) (50 mL/Hr) IV Continuous <Continuous>  dextrose 5%. 1000 milliLiter(s) (100 mL/Hr) IV Continuous <Continuous>  dextrose 50% Injectable 25 Gram(s) IV Push once  dextrose 50% Injectable 25 Gram(s) IV Push once  dextrose 50% Injectable 12.5 Gram(s) IV Push once  enoxaparin Injectable 40 milliGRAM(s) SubCutaneous every 24 hours  glucagon  Injectable 1 milliGRAM(s) IntraMuscular once  insulin lispro (HumaLOG) Pump 1 Each SubCutaneous Continuous Pump  levETIRAcetam 1000 milliGRAM(s) Oral two times a day  melatonin 5 milliGRAM(s) Oral at bedtime  methocarbamol 500 milliGRAM(s) Oral three times a day  metoprolol tartrate 25 milliGRAM(s) Oral two times a day  midodrine. 10 milliGRAM(s) Oral three times a day  multivitamin 1 Tablet(s) Oral daily  rosuvastatin 5 milliGRAM(s) Oral at bedtime  sertraline 50 milliGRAM(s) Oral daily    MEDICATIONS  (PRN):  dextrose Oral Gel 15 Gram(s) Oral once PRN Blood Glucose LESS THAN 70 milliGRAM(s)/deciliter  LORazepam   Injectable 2 milliGRAM(s) IV Push once PRN Seizure Activity  oxyCODONE    IR 5 milliGRAM(s) Oral every 6 hours PRN Moderate Pain (4 - 6)  oxyCODONE    IR 10 milliGRAM(s) Oral every 6 hours PRN Severe Pain (7 - 10)    CAPILLARY BLOOD GLUCOSE      POCT Blood Glucose.: 95 mg/dL (11 Mar 2025 21:57)    I&O's Summary      PHYSICAL EXAM:  Vital Signs Last 24 Hrs  T(C): 36.5 (12 Mar 2025 03:20), Max: 36.8 (11 Mar 2025 21:30)  T(F): 97.7 (12 Mar 2025 03:20), Max: 98.3 (11 Mar 2025 21:30)  HR: 100 (12 Mar 2025 06:30) (92 - 100)  BP: 147/82 (12 Mar 2025 06:30) (106/73 - 162/96)  BP(mean): --  RR: 18 (12 Mar 2025 03:20) (18 - 18)  SpO2: 100% (12 Mar 2025 03:20) (98% - 100%)    Parameters below as of 12 Mar 2025 03:20  Patient On (Oxygen Delivery Method): room air        CONSTITUTIONAL: NAD, well-developed, well-groomed  EYES:  EOMI, conjunctiva and sclera clear  ENMT: Moist oral mucosa  NECK: Supple, no JVD  RESPIRATORY: Normal respiratory effort; lungs are clear to auscultation bilaterally  CARDIOVASCULAR: Regular rate and rhythm, normal S1 and S2, no murmur/rub/gallop; No lower extremity edema  ABDOMEN: normoactive bowel sounds, soft, nontender to palpation, no distension   MUSCULOSKELETAL:  no clubbing or cyanosis of digits; no joint swelling or tenderness to palpation  PSYCH: A+O x3; affect appropriate, calm and cooperative  NEUROLOGY: CN 2-12 are intact and symmetric; no gross sensory deficits     LABS:                        10.4   7.99  )-----------( 266      ( 12 Mar 2025 06:38 )             32.0     03-12    143  |  105  |  10.2  ----------------------------<  131[H]  3.6   |  25.0  |  0.47[L]    Ca    9.0      12 Mar 2025 06:38  Phos  3.6     03-12  Mg     1.8     03-12            Urinalysis Basic - ( 12 Mar 2025 06:38 )    Color: x / Appearance: x / SG: x / pH: x  Gluc: 131 mg/dL / Ketone: x  / Bili: x / Urobili: x   Blood: x / Protein: x / Nitrite: x   Leuk Esterase: x / RBC: x / WBC x   Sq Epi: x / Non Sq Epi: x / Bacteria: x        Culture - Urine (collected 10 Mar 2025 17:00)  Source: Clean Catch Clean Catch (Midstream)  Preliminary Report (12 Mar 2025 04:50):    >100,000 CFU/ml Klebsiella pneumoniae

## 2025-03-14 NOTE — PROGRESS NOTE ADULT - ASSESSMENT
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 Admitted for seizures. Of note, patient missed about 1-2 weeks of keppra due to meds not being renewed and only restarted on keppra recently. Consult for diabetes mgmt, a1c 7.    1. Moderately controlled T1DM  - Please check fingersticks before meals and at bedtime while on insulin pump  - Glucoses stable, may continue to use insulin pump, orders in place  - Diabetic diet  - If patient is off insulin pump for any reason, start lantus 4 units with low dose sliding scale with meals  - C-peptide 0.8 with glucose 108/JOSE and islet cell ab pending    2. Seizures  - On EEG, care per neuro/primary team  - On Keppra     3. Syncope/autonomic dysfunction  - Symptomatic hypotension, on midodrine  - TTE showed reduced EF with diastolic dysfunction

## 2025-03-15 LAB
ANION GAP SERPL CALC-SCNC: 12 MMOL/L — SIGNIFICANT CHANGE UP (ref 5–17)
BUN SERPL-MCNC: 13.7 MG/DL — SIGNIFICANT CHANGE UP (ref 8–20)
CALCIUM SERPL-MCNC: 9.1 MG/DL — SIGNIFICANT CHANGE UP (ref 8.4–10.5)
CHLORIDE SERPL-SCNC: 108 MMOL/L — SIGNIFICANT CHANGE UP (ref 96–108)
CO2 SERPL-SCNC: 26 MMOL/L — SIGNIFICANT CHANGE UP (ref 22–29)
CREAT SERPL-MCNC: 0.63 MG/DL — SIGNIFICANT CHANGE UP (ref 0.5–1.3)
EGFR: 119 ML/MIN/1.73M2 — SIGNIFICANT CHANGE UP
EGFR: 119 ML/MIN/1.73M2 — SIGNIFICANT CHANGE UP
GLUCOSE BLDC GLUCOMTR-MCNC: 109 MG/DL — HIGH (ref 70–99)
GLUCOSE BLDC GLUCOMTR-MCNC: 184 MG/DL — HIGH (ref 70–99)
GLUCOSE BLDC GLUCOMTR-MCNC: 85 MG/DL — SIGNIFICANT CHANGE UP (ref 70–99)
GLUCOSE BLDC GLUCOMTR-MCNC: 89 MG/DL — SIGNIFICANT CHANGE UP (ref 70–99)
GLUCOSE SERPL-MCNC: 94 MG/DL — SIGNIFICANT CHANGE UP (ref 70–99)
HCT VFR BLD CALC: 31.9 % — LOW (ref 34.5–45)
HGB BLD-MCNC: 10.2 G/DL — LOW (ref 11.5–15.5)
MAGNESIUM SERPL-MCNC: 1.9 MG/DL — SIGNIFICANT CHANGE UP (ref 1.6–2.6)
MCHC RBC-ENTMCNC: 28.3 PG — SIGNIFICANT CHANGE UP (ref 27–34)
MCHC RBC-ENTMCNC: 32 G/DL — SIGNIFICANT CHANGE UP (ref 32–36)
MCV RBC AUTO: 88.6 FL — SIGNIFICANT CHANGE UP (ref 80–100)
NRBC # BLD AUTO: 0 K/UL — SIGNIFICANT CHANGE UP (ref 0–0)
NRBC # FLD: 0 K/UL — SIGNIFICANT CHANGE UP (ref 0–0)
NRBC BLD AUTO-RTO: 0 /100 WBCS — SIGNIFICANT CHANGE UP (ref 0–0)
PHOSPHATE SERPL-MCNC: 3.9 MG/DL — SIGNIFICANT CHANGE UP (ref 2.4–4.7)
PLATELET # BLD AUTO: 280 K/UL — SIGNIFICANT CHANGE UP (ref 150–400)
PMV BLD: 11.1 FL — SIGNIFICANT CHANGE UP (ref 7–13)
POTASSIUM SERPL-MCNC: 4 MMOL/L — SIGNIFICANT CHANGE UP (ref 3.5–5.3)
POTASSIUM SERPL-SCNC: 4 MMOL/L — SIGNIFICANT CHANGE UP (ref 3.5–5.3)
RBC # BLD: 3.6 M/UL — LOW (ref 3.8–5.2)
RBC # FLD: 12.1 % — SIGNIFICANT CHANGE UP (ref 10.3–14.5)
SODIUM SERPL-SCNC: 146 MMOL/L — HIGH (ref 135–145)
T PALLIDUM AB TITR SER: NEGATIVE — SIGNIFICANT CHANGE UP
VIT B12 SERPL-MCNC: 682 PG/ML — SIGNIFICANT CHANGE UP (ref 232–1245)
WBC # BLD: 6.95 K/UL — SIGNIFICANT CHANGE UP (ref 3.8–10.5)
WBC # FLD AUTO: 6.95 K/UL — SIGNIFICANT CHANGE UP (ref 3.8–10.5)

## 2025-03-15 PROCEDURE — 99233 SBSQ HOSP IP/OBS HIGH 50: CPT

## 2025-03-15 RX ORDER — ACETAMINOPHEN 500 MG/5ML
725 LIQUID (ML) ORAL ONCE
Refills: 0 | Status: COMPLETED | OUTPATIENT
Start: 2025-03-15 | End: 2025-03-15

## 2025-03-15 RX ORDER — POLYETHYLENE GLYCOL 3350 17 G/17G
17 POWDER, FOR SOLUTION ORAL DAILY
Refills: 0 | Status: DISCONTINUED | OUTPATIENT
Start: 2025-03-15 | End: 2025-03-24

## 2025-03-15 RX ORDER — KETOROLAC TROMETHAMINE 30 MG/ML
15 INJECTION, SOLUTION INTRAMUSCULAR; INTRAVENOUS ONCE
Refills: 0 | Status: DISCONTINUED | OUTPATIENT
Start: 2025-03-15 | End: 2025-03-15

## 2025-03-15 RX ADMIN — LIDOCAINE HYDROCHLORIDE 2 PATCH: 20 JELLY TOPICAL at 06:13

## 2025-03-15 RX ADMIN — OXYCODONE HYDROCHLORIDE 10 MILLIGRAM(S): 30 TABLET ORAL at 06:17

## 2025-03-15 RX ADMIN — POLYETHYLENE GLYCOL 3350 17 GRAM(S): 17 POWDER, FOR SOLUTION ORAL at 13:40

## 2025-03-15 RX ADMIN — KETOROLAC TROMETHAMINE 15 MILLIGRAM(S): 30 INJECTION, SOLUTION INTRAMUSCULAR; INTRAVENOUS at 15:30

## 2025-03-15 RX ADMIN — Medication 1 TABLET(S): at 11:58

## 2025-03-15 RX ADMIN — METHOCARBAMOL 500 MILLIGRAM(S): 500 TABLET, FILM COATED ORAL at 05:35

## 2025-03-15 RX ADMIN — KETOROLAC TROMETHAMINE 15 MILLIGRAM(S): 30 INJECTION, SOLUTION INTRAMUSCULAR; INTRAVENOUS at 16:25

## 2025-03-15 RX ADMIN — LIDOCAINE HYDROCHLORIDE 2 PATCH: 20 JELLY TOPICAL at 17:22

## 2025-03-15 RX ADMIN — SERTRALINE 50 MILLIGRAM(S): 100 TABLET, FILM COATED ORAL at 11:58

## 2025-03-15 RX ADMIN — ROSUVASTATIN CALCIUM 5 MILLIGRAM(S): 5 TABLET, FILM COATED ORAL at 22:24

## 2025-03-15 RX ADMIN — MIDODRINE HYDROCHLORIDE 10 MILLIGRAM(S): 5 TABLET ORAL at 18:31

## 2025-03-15 RX ADMIN — METHOCARBAMOL 500 MILLIGRAM(S): 500 TABLET, FILM COATED ORAL at 22:24

## 2025-03-15 RX ADMIN — OXYCODONE HYDROCHLORIDE 10 MILLIGRAM(S): 30 TABLET ORAL at 05:37

## 2025-03-15 RX ADMIN — Medication 290 MILLIGRAM(S): at 22:24

## 2025-03-15 RX ADMIN — METHOCARBAMOL 500 MILLIGRAM(S): 500 TABLET, FILM COATED ORAL at 13:40

## 2025-03-15 RX ADMIN — Medication 725 MILLIGRAM(S): at 22:54

## 2025-03-15 RX ADMIN — OXYCODONE HYDROCHLORIDE 10 MILLIGRAM(S): 30 TABLET ORAL at 22:24

## 2025-03-15 RX ADMIN — Medication 2 MILLIGRAM(S): at 23:57

## 2025-03-15 RX ADMIN — LEVETIRACETAM 1000 MILLIGRAM(S): 10 INJECTION, SOLUTION INTRAVENOUS at 17:22

## 2025-03-15 RX ADMIN — Medication 5 MILLIGRAM(S): at 22:24

## 2025-03-15 RX ADMIN — Medication 1 MILLIGRAM(S): at 17:35

## 2025-03-15 RX ADMIN — LEVETIRACETAM 1000 MILLIGRAM(S): 10 INJECTION, SOLUTION INTRAVENOUS at 05:35

## 2025-03-15 RX ADMIN — Medication 1 MILLIGRAM(S): at 16:39

## 2025-03-15 RX ADMIN — ENOXAPARIN SODIUM 40 MILLIGRAM(S): 100 INJECTION SUBCUTANEOUS at 22:24

## 2025-03-15 RX ADMIN — OXYCODONE HYDROCHLORIDE 10 MILLIGRAM(S): 30 TABLET ORAL at 11:57

## 2025-03-15 RX ADMIN — OXYCODONE HYDROCHLORIDE 10 MILLIGRAM(S): 30 TABLET ORAL at 22:54

## 2025-03-15 RX ADMIN — OXYCODONE HYDROCHLORIDE 10 MILLIGRAM(S): 30 TABLET ORAL at 12:45

## 2025-03-15 NOTE — PROVIDER CONTACT NOTE (OTHER) - SITUATION
Pt just received on 6T, BP 80/56 manually, asymptomatic
pt c/o 8/10 pain. supine bp 96/60. pt received midodrine 10 around 6pm. midodrine ordered 3x/day. pt has oxy 10 ordered for pain 8/10
Patient asleep and desated to 62

## 2025-03-15 NOTE — PROGRESS NOTE ADULT - SUBJECTIVE AND OBJECTIVE BOX
[facility] Division of Hospital Medicine    pt seen and examined at bedside  pt states that she still feels lightheaded when getting out of bed too quickly  no cp, sob, abd pain, nausea, vomiting  has not had a bm in 3 days  passing gas  tolerating po diet  naeon    MEDICATIONS  (STANDING):  dextrose 5%. 1000 milliLiter(s) (100 mL/Hr) IV Continuous <Continuous>  dextrose 5%. 1000 milliLiter(s) (50 mL/Hr) IV Continuous <Continuous>  dextrose 50% Injectable 25 Gram(s) IV Push once  dextrose 50% Injectable 12.5 Gram(s) IV Push once  dextrose 50% Injectable 25 Gram(s) IV Push once  enoxaparin Injectable 40 milliGRAM(s) SubCutaneous every 24 hours  glucagon  Injectable 1 milliGRAM(s) IntraMuscular once  insulin lispro (HumaLOG) Pump 1 Each SubCutaneous Continuous Pump  levETIRAcetam 1000 milliGRAM(s) Oral two times a day  lidocaine   4% Patch 2 Patch Transdermal every 24 hours  melatonin 5 milliGRAM(s) Oral at bedtime  methocarbamol 500 milliGRAM(s) Oral three times a day  multivitamin 1 Tablet(s) Oral daily  polyethylene glycol 3350 17 Gram(s) Oral daily  rosuvastatin 5 milliGRAM(s) Oral at bedtime  sertraline 50 milliGRAM(s) Oral daily    MEDICATIONS  (PRN):  dextrose Oral Gel 15 Gram(s) Oral once PRN Blood Glucose LESS THAN 70 milliGRAM(s)/deciliter  LORazepam   Injectable 2 milliGRAM(s) IV Push once PRN Seizure Activity  midodrine. 10 milliGRAM(s) Oral three times a day PRN Supine SBP <110  oxyCODONE    IR 5 milliGRAM(s) Oral every 6 hours PRN Moderate Pain (4 - 6)  oxyCODONE    IR 10 milliGRAM(s) Oral every 6 hours PRN Severe Pain (7 - 10)        I&O's Summary      PHYSICAL EXAM:  Vital Signs Last 24 Hrs  T(C): 36.6 (15 Mar 2025 11:21), Max: 36.8 (15 Mar 2025 04:38)  T(F): 97.8 (15 Mar 2025 11:21), Max: 98.3 (15 Mar 2025 04:38)  HR: 90 (15 Mar 2025 11:21) (70 - 104)  BP: 149/99 (15 Mar 2025 11:21) (127/80 - 153/67)  BP(mean): --  RR: 18 (15 Mar 2025 11:21) (18 - 18)  SpO2: 95% (15 Mar 2025 11:21) (94% - 96%)            CONSTITUTIONAL: NAD, well-groomed  ENMT: Moist oral mucosa, no pharyngeal injection or exudates; normal dentition  RESPIRATORY: Normal respiratory effort; lungs are clear to auscultation bilaterally  CARDIOVASCULAR: Regular rate and rhythm, normal S1 and S2, no murmur/rub/gallop; No lower extremity edema; Peripheral pulses are 2+ bilaterally  ABDOMEN: Nontender to palpation, normoactive bowel sounds, no rebound/guarding; No hepatosplenomegaly  MUSCLOSKELETAL:  Normal gait; no clubbing or cyanosis of digits; no joint swelling or tenderness to palpation  PSYCH: A+O to person, place, and time; affect appropriate  NEUROLOGY: CN 2-12 are intact and symmetric; no gross sensory deficits;   SKIN: No rashes; no palpable lesions    LABS:                        10.2   6.95  )-----------( 280      ( 15 Mar 2025 07:07 )             31.9     03-15    146[H]  |  108  |  13.7  ----------------------------<  94  4.0   |  26.0  |  0.63    Ca    9.1      15 Mar 2025 07:07  Phos  3.9     03-15  Mg     1.9     03-15            Urinalysis Basic - ( 15 Mar 2025 07:07 )    Color: x / Appearance: x / SG: x / pH: x  Gluc: 94 mg/dL / Ketone: x  / Bili: x / Urobili: x   Blood: x / Protein: x / Nitrite: x   Leuk Esterase: x / RBC: x / WBC x   Sq Epi: x / Non Sq Epi: x / Bacteria: x        CAPILLARY BLOOD GLUCOSE      POCT Blood Glucose.: 109 mg/dL (15 Mar 2025 12:00)  POCT Blood Glucose.: 85 mg/dL (15 Mar 2025 08:23)  POCT Blood Glucose.: 185 mg/dL (14 Mar 2025 22:23)  POCT Blood Glucose.: 271 mg/dL (14 Mar 2025 15:38)

## 2025-03-15 NOTE — PROGRESS NOTE ADULT - ASSESSMENT
Patient is a 34 year old female who is being managed as an inpatient for breakthrough seizure and syncope. She has a past medical history of type I DM on insulin pump, TIA, seizure disorder. Prior to hospitalization, she was recently diagnosed with new onset CHF without a clear etiology, and she has been in and out of the hospital since November. She has not fully recovered and has been having increased difficulty walking. Patient is being managed as an inpatient for breakthrough seizure requiring vEEG.     Syncope  Orthostatic hypotension likely secondary to autonomic dysfunction  Heart failure with reduced ejection fracture  - CT head negative on 3/8 and 3/10  - TTE showing LVEF is mildly decreased at 46%, global left ventricular hypokinesis  - Carotid doppler was unremarkable; Lyme serology negative  - Alert and oriented x3, answers questions and engages in conversation  - Likely due to uncontrolled DM which caused autonomic dysfunction  - Discontinued metoprolol  - Continue fall precautions  - Continue compression stockings and abdominal binder (needs appropriate size)  - Continue midodrine 10mg TID PRN  - Cardiology and Neurology following    Breakthrough seizure  Leukocytosis likely related to seizure, resolved  - s/p Keppra 1000mg IV once  - White count normalized, afebrile  - CT head negative on 3/8 and 3/10  - vEEG showing no new seizures  - Continue seizure precautions  - Continue Keppra 1000mg PO BID  - Neurology following, recs noted    Type I DM  - A1c level 7  - Patient has insulin pump  - C-peptide 0.8 with glucose 108/JOSE and islet cell ab pending  - Endocrinology following and managing    Urinary tract infection  - s/p Rocephin for total 3 days    MDD  - Continue Zoloft    HLD  - Continue Crestor      DVT/GI ppx: Lovenox  Diet: Carb consistent  Code Status: Full code  Dispo: pending dc to [facility]

## 2025-03-15 NOTE — PROVIDER CONTACT NOTE (OTHER) - ACTION/TREATMENT ORDERED:
Midodrine ordered, recheck BP in 1hr, continue to monitor
monitor
ofirmev for pain.   hold off on midodrine for now, recheck bp.

## 2025-03-16 LAB
ALBUMIN SERPL ELPH-MCNC: 3.4 G/DL — SIGNIFICANT CHANGE UP (ref 3.3–5.2)
ALP SERPL-CCNC: 48 U/L — SIGNIFICANT CHANGE UP (ref 40–120)
ALT FLD-CCNC: 29 U/L — SIGNIFICANT CHANGE UP
ANION GAP SERPL CALC-SCNC: 11 MMOL/L — SIGNIFICANT CHANGE UP (ref 5–17)
AST SERPL-CCNC: 26 U/L — SIGNIFICANT CHANGE UP
BILIRUB SERPL-MCNC: <0.2 MG/DL — LOW (ref 0.4–2)
BUN SERPL-MCNC: 14 MG/DL — SIGNIFICANT CHANGE UP (ref 8–20)
CALCIUM SERPL-MCNC: 8.9 MG/DL — SIGNIFICANT CHANGE UP (ref 8.4–10.5)
CHLORIDE SERPL-SCNC: 106 MMOL/L — SIGNIFICANT CHANGE UP (ref 96–108)
CO2 SERPL-SCNC: 27 MMOL/L — SIGNIFICANT CHANGE UP (ref 22–29)
CREAT SERPL-MCNC: 0.43 MG/DL — LOW (ref 0.5–1.3)
EGFR: 130 ML/MIN/1.73M2 — SIGNIFICANT CHANGE UP
EGFR: 130 ML/MIN/1.73M2 — SIGNIFICANT CHANGE UP
GLUCOSE BLDC GLUCOMTR-MCNC: 111 MG/DL — HIGH (ref 70–99)
GLUCOSE BLDC GLUCOMTR-MCNC: 117 MG/DL — HIGH (ref 70–99)
GLUCOSE BLDC GLUCOMTR-MCNC: 133 MG/DL — HIGH (ref 70–99)
GLUCOSE BLDC GLUCOMTR-MCNC: 135 MG/DL — HIGH (ref 70–99)
GLUCOSE SERPL-MCNC: 109 MG/DL — HIGH (ref 70–99)
HCT VFR BLD CALC: 30.8 % — LOW (ref 34.5–45)
HGB BLD-MCNC: 9.9 G/DL — LOW (ref 11.5–15.5)
MCHC RBC-ENTMCNC: 28.4 PG — SIGNIFICANT CHANGE UP (ref 27–34)
MCHC RBC-ENTMCNC: 32.1 G/DL — SIGNIFICANT CHANGE UP (ref 32–36)
MCV RBC AUTO: 88.3 FL — SIGNIFICANT CHANGE UP (ref 80–100)
NRBC # BLD AUTO: 0 K/UL — SIGNIFICANT CHANGE UP (ref 0–0)
NRBC # FLD: 0 K/UL — SIGNIFICANT CHANGE UP (ref 0–0)
NRBC BLD AUTO-RTO: 0 /100 WBCS — SIGNIFICANT CHANGE UP (ref 0–0)
PLATELET # BLD AUTO: 268 K/UL — SIGNIFICANT CHANGE UP (ref 150–400)
PMV BLD: 11.1 FL — SIGNIFICANT CHANGE UP (ref 7–13)
POTASSIUM SERPL-MCNC: 3.9 MMOL/L — SIGNIFICANT CHANGE UP (ref 3.5–5.3)
POTASSIUM SERPL-SCNC: 3.9 MMOL/L — SIGNIFICANT CHANGE UP (ref 3.5–5.3)
PROT SERPL-MCNC: 6.1 G/DL — LOW (ref 6.6–8.7)
RBC # BLD: 3.49 M/UL — LOW (ref 3.8–5.2)
RBC # FLD: 12.1 % — SIGNIFICANT CHANGE UP (ref 10.3–14.5)
SODIUM SERPL-SCNC: 143 MMOL/L — SIGNIFICANT CHANGE UP (ref 135–145)
WBC # BLD: 6.54 K/UL — SIGNIFICANT CHANGE UP (ref 3.8–10.5)
WBC # FLD AUTO: 6.54 K/UL — SIGNIFICANT CHANGE UP (ref 3.8–10.5)

## 2025-03-16 PROCEDURE — 99232 SBSQ HOSP IP/OBS MODERATE 35: CPT

## 2025-03-16 RX ORDER — HYDROMORPHONE/SOD CHLOR,ISO/PF 2 MG/10 ML
0.2 SYRINGE (ML) INJECTION EVERY 8 HOURS
Refills: 0 | Status: DISCONTINUED | OUTPATIENT
Start: 2025-03-16 | End: 2025-03-18

## 2025-03-16 RX ORDER — HYDROMORPHONE/SOD CHLOR,ISO/PF 2 MG/10 ML
0.2 SYRINGE (ML) INJECTION ONCE
Refills: 0 | Status: DISCONTINUED | OUTPATIENT
Start: 2025-03-16 | End: 2025-03-16

## 2025-03-16 RX ORDER — HYDROMORPHONE/SOD CHLOR,ISO/PF 2 MG/10 ML
0.25 SYRINGE (ML) INJECTION ONCE
Refills: 0 | Status: DISCONTINUED | OUTPATIENT
Start: 2025-03-16 | End: 2025-03-16

## 2025-03-16 RX ADMIN — Medication 0.25 MILLIGRAM(S): at 02:08

## 2025-03-16 RX ADMIN — Medication 0.2 MILLIGRAM(S): at 16:58

## 2025-03-16 RX ADMIN — LIDOCAINE HYDROCHLORIDE 2 PATCH: 20 JELLY TOPICAL at 17:11

## 2025-03-16 RX ADMIN — SERTRALINE 50 MILLIGRAM(S): 100 TABLET, FILM COATED ORAL at 11:06

## 2025-03-16 RX ADMIN — METHOCARBAMOL 500 MILLIGRAM(S): 500 TABLET, FILM COATED ORAL at 21:27

## 2025-03-16 RX ADMIN — LEVETIRACETAM 1000 MILLIGRAM(S): 10 INJECTION, SOLUTION INTRAVENOUS at 17:12

## 2025-03-16 RX ADMIN — Medication 1 TABLET(S): at 11:06

## 2025-03-16 RX ADMIN — Medication 2 MILLIGRAM(S): at 00:30

## 2025-03-16 RX ADMIN — Medication 0.2 MILLIGRAM(S): at 21:26

## 2025-03-16 RX ADMIN — ENOXAPARIN SODIUM 40 MILLIGRAM(S): 100 INJECTION SUBCUTANEOUS at 21:27

## 2025-03-16 RX ADMIN — LIDOCAINE HYDROCHLORIDE 2 PATCH: 20 JELLY TOPICAL at 00:11

## 2025-03-16 RX ADMIN — ROSUVASTATIN CALCIUM 5 MILLIGRAM(S): 5 TABLET, FILM COATED ORAL at 21:27

## 2025-03-16 RX ADMIN — METHOCARBAMOL 500 MILLIGRAM(S): 500 TABLET, FILM COATED ORAL at 05:28

## 2025-03-16 RX ADMIN — Medication 0.25 MILLIGRAM(S): at 02:30

## 2025-03-16 RX ADMIN — LEVETIRACETAM 1000 MILLIGRAM(S): 10 INJECTION, SOLUTION INTRAVENOUS at 05:27

## 2025-03-16 RX ADMIN — Medication 0.2 MILLIGRAM(S): at 15:25

## 2025-03-16 RX ADMIN — LIDOCAINE HYDROCHLORIDE 2 PATCH: 20 JELLY TOPICAL at 05:25

## 2025-03-16 RX ADMIN — METHOCARBAMOL 500 MILLIGRAM(S): 500 TABLET, FILM COATED ORAL at 13:14

## 2025-03-16 RX ADMIN — Medication 5 MILLIGRAM(S): at 21:27

## 2025-03-16 RX ADMIN — Medication 0.2 MILLIGRAM(S): at 14:25

## 2025-03-16 NOTE — PROGRESS NOTE ADULT - SUBJECTIVE AND OBJECTIVE BOX
[facility] Division of Hospital Medicine    pt seen and examined at bedside  feeling okay, back pain controlled  able to participate with PT/OT today without issue  tolerating po diet  normal stooling and urination    MEDICATIONS  (STANDING):  dextrose 5%. 1000 milliLiter(s) (50 mL/Hr) IV Continuous <Continuous>  dextrose 5%. 1000 milliLiter(s) (100 mL/Hr) IV Continuous <Continuous>  dextrose 50% Injectable 25 Gram(s) IV Push once  dextrose 50% Injectable 12.5 Gram(s) IV Push once  dextrose 50% Injectable 25 Gram(s) IV Push once  enoxaparin Injectable 40 milliGRAM(s) SubCutaneous every 24 hours  glucagon  Injectable 1 milliGRAM(s) IntraMuscular once  insulin lispro (HumaLOG) Pump 1 Each SubCutaneous Continuous Pump  levETIRAcetam 1000 milliGRAM(s) Oral two times a day  lidocaine   4% Patch 2 Patch Transdermal every 24 hours  melatonin 5 milliGRAM(s) Oral at bedtime  methocarbamol 500 milliGRAM(s) Oral three times a day  multivitamin 1 Tablet(s) Oral daily  polyethylene glycol 3350 17 Gram(s) Oral daily  rosuvastatin 5 milliGRAM(s) Oral at bedtime  sertraline 50 milliGRAM(s) Oral daily    MEDICATIONS  (PRN):  dextrose Oral Gel 15 Gram(s) Oral once PRN Blood Glucose LESS THAN 70 milliGRAM(s)/deciliter  midodrine. 10 milliGRAM(s) Oral three times a day PRN Supine SBP <110  oxyCODONE    IR 5 milliGRAM(s) Oral every 6 hours PRN Moderate Pain (4 - 6)  oxyCODONE    IR 10 milliGRAM(s) Oral every 6 hours PRN Severe Pain (7 - 10)        I&O's Summary    15 Mar 2025 07:01  -  16 Mar 2025 07:00  --------------------------------------------------------  IN: 0 mL / OUT: 500 mL / NET: -500 mL        PHYSICAL EXAM:  Vital Signs Last 24 Hrs  T(C): 36.6 (16 Mar 2025 10:20), Max: 37.1 (16 Mar 2025 00:01)  T(F): 97.8 (16 Mar 2025 10:20), Max: 98.8 (16 Mar 2025 00:01)  HR: 101 (16 Mar 2025 10:20) (90 - 101)  BP: 134/75 (16 Mar 2025 10:20) (94/62 - 170/100)  BP(mean): --  RR: 17 (16 Mar 2025 10:20) (17 - 18)  SpO2: 96% (16 Mar 2025 10:20) (96% - 98%)    Parameters below as of 16 Mar 2025 04:46  Patient On (Oxygen Delivery Method): room air            CONSTITUTIONAL: NAD, well-groomed  ENMT: Moist oral mucosa, no pharyngeal injection or exudates; normal dentition  RESPIRATORY: Normal respiratory effort; lungs are clear to auscultation bilaterally  CARDIOVASCULAR: Regular rate and rhythm, normal S1 and S2, no murmur/rub/gallop; No lower extremity edema; Peripheral pulses are 2+ bilaterally  ABDOMEN: Nontender to palpation, normoactive bowel sounds, no rebound/guarding; No hepatosplenomegaly  MUSCLOSKELETAL:  Normal gait; no clubbing or cyanosis of digits; no joint swelling or tenderness to palpation  PSYCH: A+O to person, place, and time; affect appropriate  NEUROLOGY: CN 2-12 are intact and symmetric; no gross sensory deficits;   SKIN: No rashes; no palpable lesions    LABS:                        9.9    6.54  )-----------( 268      ( 16 Mar 2025 07:21 )             30.8     03-16    143  |  106  |  14.0  ----------------------------<  109[H]  3.9   |  27.0  |  0.43[L]    Ca    8.9      16 Mar 2025 07:21  Phos  3.9     03-15  Mg     1.9     03-15    TPro  6.1[L]  /  Alb  3.4  /  TBili  <0.2[L]  /  DBili  x   /  AST  26  /  ALT  29  /  AlkPhos  48  03-16          Urinalysis Basic - ( 16 Mar 2025 07:21 )    Color: x / Appearance: x / SG: x / pH: x  Gluc: 109 mg/dL / Ketone: x  / Bili: x / Urobili: x   Blood: x / Protein: x / Nitrite: x   Leuk Esterase: x / RBC: x / WBC x   Sq Epi: x / Non Sq Epi: x / Bacteria: x        CAPILLARY BLOOD GLUCOSE      POCT Blood Glucose.: 111 mg/dL (16 Mar 2025 11:05)  POCT Blood Glucose.: 117 mg/dL (16 Mar 2025 07:28)  POCT Blood Glucose.: 89 mg/dL (15 Mar 2025 21:55)  POCT Blood Glucose.: 184 mg/dL (15 Mar 2025 17:16)  POCT Blood Glucose.: 109 mg/dL (15 Mar 2025 12:00)

## 2025-03-16 NOTE — OCCUPATIONAL THERAPY INITIAL EVALUATION ADULT - ADDITIONAL COMMENTS
Pt has a tub with doors and no grab bars. Pt required assist for all ADLs and supervision/minimal assist for all functional mobility with a RW. Pt reports she was being assisted by her father who had taken time off of work, but is returning soon. Mother assists in evening, but also works. Pt owns a RW and commode. Pt will be alone during day. Pt is R handed and drives

## 2025-03-16 NOTE — OCCUPATIONAL THERAPY INITIAL EVALUATION ADULT - GENERAL OBSERVATIONS, REHAB EVAL
Received pt semifowler in bed, NAD, +IV lock, +Tele, on RA A&Ox4. Pre/post pain 0/10, pt c/o feeling "shaky". Pt agreeable to OT evaluation

## 2025-03-16 NOTE — OCCUPATIONAL THERAPY INITIAL EVALUATION ADULT - PERTINENT HX OF CURRENT PROBLEM, REHAB EVAL
Orthostatic hypotension likely secondary to autonomic dysfunction  Heart failure with reduced ejection fracture  - CT head negative on 3/8 and 3/10

## 2025-03-16 NOTE — PROGRESS NOTE ADULT - ASSESSMENT
34 year old female who is being managed as an inpatient for breakthrough seizure and syncope. She has a past medical history of type I DM on insulin pump, TIA, seizure disorder. Prior to hospitalization, she was recently diagnosed with new onset CHF without a clear etiology, and she has been in and out of the hospital since November. She has not fully recovered and has been having increased difficulty walking. Patient is being managed as an inpatient for breakthrough seizure requiring vEEG.     Syncope  Orthostatic hypotension likely secondary to autonomic dysfunction  Heart failure with reduced ejection fracture  - CT head negative on 3/8 and 3/10  - TTE showing LVEF is mildly decreased at 46%, global left ventricular hypokinesis  - Carotid doppler was unremarkable; Lyme serology negative  - Alert and oriented x3, answers questions and engages in conversation  - Likely due to uncontrolled DM which caused autonomic dysfunction  - Discontinued metoprolol  - Continue fall precautions  - Continue compression stockings and abdominal binder (needs appropriate size)  - Continue midodrine 10mg TID PRN  - Cardiology and Neurology following    Breakthrough seizure  Leukocytosis likely related to seizure, resolved  - s/p Keppra 1000mg IV once  - White count normalized, afebrile  - CT head negative on 3/8 and 3/10  - vEEG showing no new seizures  - Continue seizure precautions  - Continue Keppra 1000mg PO BID  - Neurology following, recs noted    Type I DM  - A1c level 7  - Patient has insulin pump  - C-peptide 0.8 with glucose 108/JOSE and islet cell ab pending  - Endocrinology following and managing    Urinary tract infection  - s/p Rocephin for total 3 days    MDD  - Continue Zoloft    HLD  - Continue Crestor      DVT/GI ppx: Lovenox  Diet: Carb consistent  Code Status: Full code  Dispo: pending dc to [facility]

## 2025-03-16 NOTE — OCCUPATIONAL THERAPY INITIAL EVALUATION ADULT - LEVEL OF INDEPENDENCE: TOILET, REHAB EVAL
to assess; pt reports light headed/dizzy ambulating toward bathroom. Pt returned to chair /71 RN aware

## 2025-03-16 NOTE — OCCUPATIONAL THERAPY INITIAL EVALUATION ADULT - RANGE OF MOTION EXAMINATION, UPPER EXTREMITY
Grossly weak, impaired motor control bilaterally/bilateral UE Active ROM was WFL  (within functional limits)

## 2025-03-17 ENCOUNTER — TRANSCRIPTION ENCOUNTER (OUTPATIENT)
Age: 35
End: 2025-03-17

## 2025-03-17 LAB
GAD65 AB SER-MCNC: 0 NMOL/L — SIGNIFICANT CHANGE UP
GLUCOSE BLDC GLUCOMTR-MCNC: 105 MG/DL — HIGH (ref 70–99)
GLUCOSE BLDC GLUCOMTR-MCNC: 138 MG/DL — HIGH (ref 70–99)
GLUCOSE BLDC GLUCOMTR-MCNC: 148 MG/DL — HIGH (ref 70–99)
GLUCOSE BLDC GLUCOMTR-MCNC: 91 MG/DL — SIGNIFICANT CHANGE UP (ref 70–99)
ISLET CELL512 AB SER-SCNC: 0 NMOL/L — SIGNIFICANT CHANGE UP

## 2025-03-17 PROCEDURE — 99232 SBSQ HOSP IP/OBS MODERATE 35: CPT

## 2025-03-17 RX ORDER — HYDROMORPHONE/SOD CHLOR,ISO/PF 2 MG/10 ML
0.2 SYRINGE (ML) INJECTION ONCE
Refills: 0 | Status: DISCONTINUED | OUTPATIENT
Start: 2025-03-17 | End: 2025-03-17

## 2025-03-17 RX ADMIN — ENOXAPARIN SODIUM 40 MILLIGRAM(S): 100 INJECTION SUBCUTANEOUS at 22:16

## 2025-03-17 RX ADMIN — LIDOCAINE HYDROCHLORIDE 2 PATCH: 20 JELLY TOPICAL at 17:00

## 2025-03-17 RX ADMIN — Medication 1 TABLET(S): at 11:06

## 2025-03-17 RX ADMIN — Medication 0.2 MILLIGRAM(S): at 22:16

## 2025-03-17 RX ADMIN — METHOCARBAMOL 500 MILLIGRAM(S): 500 TABLET, FILM COATED ORAL at 13:11

## 2025-03-17 RX ADMIN — Medication 0.2 MILLIGRAM(S): at 23:16

## 2025-03-17 RX ADMIN — LEVETIRACETAM 1000 MILLIGRAM(S): 10 INJECTION, SOLUTION INTRAVENOUS at 05:28

## 2025-03-17 RX ADMIN — LIDOCAINE HYDROCHLORIDE 2 PATCH: 20 JELLY TOPICAL at 19:00

## 2025-03-17 RX ADMIN — OXYCODONE HYDROCHLORIDE 10 MILLIGRAM(S): 30 TABLET ORAL at 01:33

## 2025-03-17 RX ADMIN — LEVETIRACETAM 1000 MILLIGRAM(S): 10 INJECTION, SOLUTION INTRAVENOUS at 17:01

## 2025-03-17 RX ADMIN — SERTRALINE 50 MILLIGRAM(S): 100 TABLET, FILM COATED ORAL at 11:06

## 2025-03-17 RX ADMIN — OXYCODONE HYDROCHLORIDE 10 MILLIGRAM(S): 30 TABLET ORAL at 14:59

## 2025-03-17 RX ADMIN — METHOCARBAMOL 500 MILLIGRAM(S): 500 TABLET, FILM COATED ORAL at 05:28

## 2025-03-17 RX ADMIN — Medication 0.2 MILLIGRAM(S): at 05:28

## 2025-03-17 RX ADMIN — METHOCARBAMOL 500 MILLIGRAM(S): 500 TABLET, FILM COATED ORAL at 22:16

## 2025-03-17 RX ADMIN — Medication 0.2 MILLIGRAM(S): at 11:00

## 2025-03-17 RX ADMIN — Medication 5 MILLIGRAM(S): at 22:16

## 2025-03-17 RX ADMIN — MIDODRINE HYDROCHLORIDE 10 MILLIGRAM(S): 5 TABLET ORAL at 01:33

## 2025-03-17 RX ADMIN — ROSUVASTATIN CALCIUM 5 MILLIGRAM(S): 5 TABLET, FILM COATED ORAL at 22:16

## 2025-03-17 NOTE — PROGRESS NOTE ADULT - SUBJECTIVE AND OBJECTIVE BOX
[facility] Division of Hospital Medicine    pt seen and examined at bedside  pt feeling well, no complaints this am  tolerating po diet  naeon  normal stooling and urination    MEDICATIONS  (STANDING):  dextrose 5%. 1000 milliLiter(s) (50 mL/Hr) IV Continuous <Continuous>  dextrose 5%. 1000 milliLiter(s) (100 mL/Hr) IV Continuous <Continuous>  dextrose 50% Injectable 25 Gram(s) IV Push once  dextrose 50% Injectable 12.5 Gram(s) IV Push once  dextrose 50% Injectable 25 Gram(s) IV Push once  enoxaparin Injectable 40 milliGRAM(s) SubCutaneous every 24 hours  glucagon  Injectable 1 milliGRAM(s) IntraMuscular once  insulin lispro (HumaLOG) Pump 1 Each SubCutaneous Continuous Pump  levETIRAcetam 1000 milliGRAM(s) Oral two times a day  lidocaine   4% Patch 2 Patch Transdermal every 24 hours  melatonin 5 milliGRAM(s) Oral at bedtime  methocarbamol 500 milliGRAM(s) Oral three times a day  multivitamin 1 Tablet(s) Oral daily  polyethylene glycol 3350 17 Gram(s) Oral daily  rosuvastatin 5 milliGRAM(s) Oral at bedtime  sertraline 50 milliGRAM(s) Oral daily    MEDICATIONS  (PRN):  dextrose Oral Gel 15 Gram(s) Oral once PRN Blood Glucose LESS THAN 70 milliGRAM(s)/deciliter  HYDROmorphone  Injectable 0.2 milliGRAM(s) IV Push every 8 hours PRN Severe Pain (7 - 10)  midodrine. 10 milliGRAM(s) Oral three times a day PRN Supine SBP <110  oxyCODONE    IR 5 milliGRAM(s) Oral every 6 hours PRN Moderate Pain (4 - 6)  oxyCODONE    IR 10 milliGRAM(s) Oral every 6 hours PRN Severe Pain (7 - 10)        I&O's Summary    16 Mar 2025 07:01  -  17 Mar 2025 07:00  --------------------------------------------------------  IN: 0 mL / OUT: 1000 mL / NET: -1000 mL        PHYSICAL EXAM:  Vital Signs Last 24 Hrs  T(C): 36.8 (17 Mar 2025 10:35), Max: 36.8 (17 Mar 2025 00:50)  T(F): 98.2 (17 Mar 2025 10:35), Max: 98.2 (17 Mar 2025 00:50)  HR: 89 (17 Mar 2025 10:35) (89 - 101)  BP: 131/85 (17 Mar 2025 10:35) (103/70 - 166/111)  BP(mean): --  RR: 18 (17 Mar 2025 10:35) (18 - 18)  SpO2: 100% (17 Mar 2025 10:35) (94% - 100%)    Parameters below as of 17 Mar 2025 10:35  Patient On (Oxygen Delivery Method): nasal cannula            CONSTITUTIONAL: NAD, well-groomed  ENMT: Moist oral mucosa, no pharyngeal injection or exudates; normal dentition  RESPIRATORY: Normal respiratory effort; lungs are clear to auscultation bilaterally  CARDIOVASCULAR: Regular rate and rhythm, normal S1 and S2, no murmur/rub/gallop; No lower extremity edema; Peripheral pulses are 2+ bilaterally  ABDOMEN: Nontender to palpation, normoactive bowel sounds, no rebound/guarding; No hepatosplenomegaly  MUSCLOSKELETAL:  Normal gait; no clubbing or cyanosis of digits; no joint swelling or tenderness to palpation  PSYCH: A+O to person, place, and time; affect appropriate  NEUROLOGY: CN 2-12 are intact and symmetric; no gross sensory deficits;   SKIN: No rashes; no palpable lesions    LABS:                        9.9    6.54  )-----------( 268      ( 16 Mar 2025 07:21 )             30.8     03-16    143  |  106  |  14.0  ----------------------------<  109[H]  3.9   |  27.0  |  0.43[L]    Ca    8.9      16 Mar 2025 07:21    TPro  6.1[L]  /  Alb  3.4  /  TBili  <0.2[L]  /  DBili  x   /  AST  26  /  ALT  29  /  AlkPhos  48  03-16          Urinalysis Basic - ( 16 Mar 2025 07:21 )    Color: x / Appearance: x / SG: x / pH: x  Gluc: 109 mg/dL / Ketone: x  / Bili: x / Urobili: x   Blood: x / Protein: x / Nitrite: x   Leuk Esterase: x / RBC: x / WBC x   Sq Epi: x / Non Sq Epi: x / Bacteria: x        CAPILLARY BLOOD GLUCOSE      POCT Blood Glucose.: 148 mg/dL (17 Mar 2025 07:22)  POCT Blood Glucose.: 135 mg/dL (16 Mar 2025 21:44)  POCT Blood Glucose.: 133 mg/dL (16 Mar 2025 16:48)  POCT Blood Glucose.: 111 mg/dL (16 Mar 2025 11:05)

## 2025-03-17 NOTE — PROGRESS NOTE ADULT - SUBJECTIVE AND OBJECTIVE BOX
CC: Patient being seen for rehabilitation follow up.  Patient resting.  Limited participation.    FUNCTIONAL PROGRESS  3/16 PT  Bed Mobility  Bed Mobility Training Supine-to-Sit: minimum assist (75% patient effort);  verbal cues;  nonverbal cues (demo/gestures);  1 person assist;  bed rails  Bed Mobility Training Limitations: decreased strength;  decreased ability to use legs for bridging/pushing;  impaired balance    Sit-Stand Transfer Training  Transfer Training Sit-to-Stand Transfer: minimum assist (75% patient effort);  verbal cues;  nonverbal cues (demo/gestures);  1 person assist;  full weight-bearing   rolling walker  Transfer Training Stand-to-Sit Transfer: minimum assist (75% patient effort);  1 person assist;  nonverbal cues (demo/gestures);  verbal cues;  full weight-bearing   rolling walker  Sit-to-Stand Transfer Training Transfer Safety Analysis: decreased weight-shifting ability;  decreased step length;  decreased strength;  impaired balance    Gait Training  Gait Training: minimum assist (75% patient effort);  1 person assist;  nonverbal cues (demo/gestures);  verbal cues;  full weight-bearing   rolling walker;  35ft- limited by dizziness /71  Gait Analysis: 3-point gait   decreased yang;  decreased step length;  pain;  decreased strength;  impaired balance    3/16 OT    Bathing Training:     · Level of [place]	moderate assist (50% patients effort)    Upper Body Dressing Training:     · Level of [place]	minimum assist (75% patients effort); moderate assist (50% patients effort)    Lower Body Dressing Training:     · Level of [place]	maximum assist (25% patients effort)  · Physical Assist/Nonphysical Assist	pt would benefit from LB device training    Toilet Hygiene Training:     · Level of [place]	to assess    Grooming Training:     · Level of [place]	minimum assist (75% patients effort)    Eating/Self-Feeding Training:     · Level of [place]	Did not directly observe      VITALS  T(C): 36.6 (03-17-25 @ 05:05), Max: 36.8 (03-17-25 @ 00:50)  HR: 91 (03-17-25 @ 07:24) (91 - 101)  BP: 158/93 (03-17-25 @ 07:24) (103/70 - 166/111)  RR: 18 (03-17-25 @ 05:05) (17 - 18)  SpO2: 96% (03-17-25 @ 05:05) (94% - 98%)  Wt(kg): --    MEDICATIONS   dextrose 5%. 1000 milliLiter(s) <Continuous>  dextrose 5%. 1000 milliLiter(s) <Continuous>  dextrose 50% Injectable 25 Gram(s) once  dextrose 50% Injectable 12.5 Gram(s) once  dextrose 50% Injectable 25 Gram(s) once  dextrose Oral Gel 15 Gram(s) once PRN  enoxaparin Injectable 40 milliGRAM(s) every 24 hours  glucagon  Injectable 1 milliGRAM(s) once  HYDROmorphone  Injectable 0.2 milliGRAM(s) every 8 hours PRN  insulin lispro (HumaLOG) Pump 1 Each Continuous Pump  levETIRAcetam 1000 milliGRAM(s) two times a day  lidocaine   4% Patch 2 Patch every 24 hours  melatonin 5 milliGRAM(s) at bedtime  methocarbamol 500 milliGRAM(s) three times a day  midodrine. 10 milliGRAM(s) three times a day PRN  multivitamin 1 Tablet(s) daily  oxyCODONE    IR 5 milliGRAM(s) every 6 hours PRN  oxyCODONE    IR 10 milliGRAM(s) every 6 hours PRN  polyethylene glycol 3350 17 Gram(s) daily  rosuvastatin 5 milliGRAM(s) at bedtime  sertraline 50 milliGRAM(s) daily      RECENT LABS/IMAGING  - Reviewed Today                        9.9    6.54  )-----------( 268      ( 16 Mar 2025 07:21 )             30.8     03-16    143  |  106  |  14.0  ----------------------------<  109[H]  3.9   |  27.0  |  0.43[L]    Ca    8.9      16 Mar 2025 07:21    TPro  6.1[L]  /  Alb  3.4  /  TBili  <0.2[L]  /  DBili  x   /  AST  26  /  ALT  29  /  AlkPhos  48  03-16      Urinalysis Basic - ( 16 Mar 2025 07:21 )    Color: x / Appearance: x / SG: x / pH: x  Gluc: 109 mg/dL / Ketone: x  / Bili: x / Urobili: x   Blood: x / Protein: x / Nitrite: x   Leuk Esterase: x / RBC: x / WBC x   Sq Epi: x / Non Sq Epi: x / Bacteria: x            HEAD CT 3/10 -  No acute intracranial hemorrhage, mass effect, or shift of the midline structures. Unchanged nonspecific adenoidal hypertrophy.    TTE 3/10 -  1. Left atrium is normal in size.   2. Left ventricular systolic function is mildly decreased with an ejection fraction of 46 % by Grey's method of disks. Global left ventricular hypokinesis.   3. Left ventricular global longitudinal strain is -11.0 % is abnormal (> -16%).   4. There is mild (grade 1) left ventricular diastolic dysfunction.   5. The right atrium is normal in size.   6. Normal right ventricular cavity size and normal right ventricular systolic function.   7. No significant valvular disease.   8. No pericardial effusion seen.   9. 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      Consider Cardiac CT angiography to assess for anomalous coronary artery if clinically indicated.          ----------------------------------------------------------------------------------------  PHYSICAL EXAM  Constitutional - NAD, Appears Comfortable    Psychiatric - Fatigued  ----------------------------------------------------------------------------------------  ASSESSMENT/PLAN  35yFemale with functional deficits after developing breakthrough seizures  Breakthrough Seizures - Keppra  Syncope/Orthostatic Hypotension/HTN - Midodrine PRN  DM1 - Humalog Pump, ISS  CAD - Crestor  Mood D/O - Zoloft  Sleep - Melatonin   Pain - Tylenol, Oxycodone, Lidoderm, Robaxin, DC ALL IV MEDS  DVT PPX - SCDs, Lovenox  Rehab/Impaired mobility and function - Patient continues to require hospitalization for the above diagnoses and ongoing active management of comorbid complications that are substantially posing a threat to bodily function, functional ability and quality of life.     RECOMMEND - OOB daily     When medically optimized, based on the patient's diagnosis, current functional status and potential for progress, recommend ACUTE inpatient rehabilitation for the functional deficits consisting of 3 hours of therapy/day & 24 hour RN/daily PMR physician for active comorbid medical management. Patient will be able to tolerate 3 hours a day.    Will need PT and OT within 48hrs AR admission.

## 2025-03-17 NOTE — PROGRESS NOTE ADULT - SUBJECTIVE AND OBJECTIVE BOX
INTERVAL EVENTS:  Follow up diabetes management. On insulin pump, glucoses stable.     MEDICATIONS  (STANDING):  dextrose 5%. 1000 milliLiter(s) (50 mL/Hr) IV Continuous <Continuous>  dextrose 5%. 1000 milliLiter(s) (100 mL/Hr) IV Continuous <Continuous>  dextrose 50% Injectable 25 Gram(s) IV Push once  dextrose 50% Injectable 12.5 Gram(s) IV Push once  dextrose 50% Injectable 25 Gram(s) IV Push once  enoxaparin Injectable 40 milliGRAM(s) SubCutaneous every 24 hours  glucagon  Injectable 1 milliGRAM(s) IntraMuscular once  insulin lispro (HumaLOG) Pump 1 Each SubCutaneous Continuous Pump  levETIRAcetam 1000 milliGRAM(s) Oral two times a day  lidocaine   4% Patch 2 Patch Transdermal every 24 hours  melatonin 5 milliGRAM(s) Oral at bedtime  methocarbamol 500 milliGRAM(s) Oral three times a day  multivitamin 1 Tablet(s) Oral daily  polyethylene glycol 3350 17 Gram(s) Oral daily  rosuvastatin 5 milliGRAM(s) Oral at bedtime  sertraline 50 milliGRAM(s) Oral daily    MEDICATIONS  (PRN):  dextrose Oral Gel 15 Gram(s) Oral once PRN Blood Glucose LESS THAN 70 milliGRAM(s)/deciliter  HYDROmorphone  Injectable 0.2 milliGRAM(s) IV Push every 8 hours PRN Severe Pain (7 - 10)  midodrine. 10 milliGRAM(s) Oral three times a day PRN Supine SBP <110  oxyCODONE    IR 5 milliGRAM(s) Oral every 6 hours PRN Moderate Pain (4 - 6)  oxyCODONE    IR 10 milliGRAM(s) Oral every 6 hours PRN Severe Pain (7 - 10)    Allergies  No Known Allergies    Vital Signs Last 24 Hrs  T(C): 36.6 (17 Mar 2025 05:05), Max: 36.8 (17 Mar 2025 00:50)  T(F): 97.8 (17 Mar 2025 05:05), Max: 98.2 (17 Mar 2025 00:50)  HR: 91 (17 Mar 2025 07:24) (91 - 101)  BP: 158/93 (17 Mar 2025 07:24) (103/70 - 166/111)  BP(mean): --  RR: 18 (17 Mar 2025 05:05) (18 - 18)  SpO2: 96% (17 Mar 2025 05:05) (94% - 98%)    Parameters below as of 16 Mar 2025 20:28  Patient On (Oxygen Delivery Method): room air    PHYSICAL EXAM:  General: No apparent distress  Respiratory: Lungs clear bilaterally  Cardiac: +S1, S2, no m/r/g  GI: +BS, soft, non tender, non distended  Extremities: No peripheral edema  Neuro: A+O X3    LABS:                        9.9    6.54  )-----------( 268      ( 16 Mar 2025 07:21 )             30.8     03-16    143  |  106  |  14.0  ----------------------------<  109[H]  3.9   |  27.0  |  0.43[L]    Ca    8.9      16 Mar 2025 07:21    TPro  6.1[L]  /  Alb  3.4  /  TBili  <0.2[L]  /  DBili  x   /  AST  26  /  ALT  29  /  AlkPhos  48  03-16    Urinalysis Basic - ( 16 Mar 2025 07:21 )    Color: x / Appearance: x / SG: x / pH: x  Gluc: 109 mg/dL / Ketone: x  / Bili: x / Urobili: x   Blood: x / Protein: x / Nitrite: x   Leuk Esterase: x / RBC: x / WBC x   Sq Epi: x / Non Sq Epi: x / Bacteria:     POCT Blood Glucose.: 148 mg/dL (03-17-25 @ 07:22)  POCT Blood Glucose.: 135 mg/dL (03-16-25 @ 21:44)  POCT Blood Glucose.: 133 mg/dL (03-16-25 @ 16:48)  POCT Blood Glucose.: 111 mg/dL (03-16-25 @ 11:05)    Triiodothyronine, Total (T3 Total): 83 ng/dL (03-08-25 @ 15:55)  Free Thyroxine, Serum: 1.2 ng/dL (03-08-25 @ 15:55)  Thyroid Stimulating Hormone, Serum: 1.11 uIU/mL (03-08-25 @ 15:55) INTERVAL EVENTS:  Follow up diabetes management. On insulin pump, glucoses stable.     MEDICATIONS  (STANDING):  dextrose 5%. 1000 milliLiter(s) (50 mL/Hr) IV Continuous <Continuous>  dextrose 5%. 1000 milliLiter(s) (100 mL/Hr) IV Continuous <Continuous>  dextrose 50% Injectable 25 Gram(s) IV Push once  dextrose 50% Injectable 12.5 Gram(s) IV Push once  dextrose 50% Injectable 25 Gram(s) IV Push once  enoxaparin Injectable 40 milliGRAM(s) SubCutaneous every 24 hours  glucagon  Injectable 1 milliGRAM(s) IntraMuscular once  insulin lispro (HumaLOG) Pump 1 Each SubCutaneous Continuous Pump  levETIRAcetam 1000 milliGRAM(s) Oral two times a day  lidocaine   4% Patch 2 Patch Transdermal every 24 hours  melatonin 5 milliGRAM(s) Oral at bedtime  methocarbamol 500 milliGRAM(s) Oral three times a day  multivitamin 1 Tablet(s) Oral daily  polyethylene glycol 3350 17 Gram(s) Oral daily  rosuvastatin 5 milliGRAM(s) Oral at bedtime  sertraline 50 milliGRAM(s) Oral daily    MEDICATIONS  (PRN):  dextrose Oral Gel 15 Gram(s) Oral once PRN Blood Glucose LESS THAN 70 milliGRAM(s)/deciliter  HYDROmorphone  Injectable 0.2 milliGRAM(s) IV Push every 8 hours PRN Severe Pain (7 - 10)  midodrine. 10 milliGRAM(s) Oral three times a day PRN Supine SBP <110  oxyCODONE    IR 5 milliGRAM(s) Oral every 6 hours PRN Moderate Pain (4 - 6)  oxyCODONE    IR 10 milliGRAM(s) Oral every 6 hours PRN Severe Pain (7 - 10)    Allergies  No Known Allergies    Vital Signs Last 24 Hrs  T(C): 36.6 (17 Mar 2025 05:05), Max: 36.8 (17 Mar 2025 00:50)  T(F): 97.8 (17 Mar 2025 05:05), Max: 98.2 (17 Mar 2025 00:50)  HR: 91 (17 Mar 2025 07:24) (91 - 101)  BP: 158/93 (17 Mar 2025 07:24) (103/70 - 166/111)  BP(mean): --  RR: 18 (17 Mar 2025 05:05) (18 - 18)  SpO2: 96% (17 Mar 2025 05:05) (94% - 98%)    Parameters below as of 16 Mar 2025 20:28  Patient On (Oxygen Delivery Method): room air    PHYSICAL EXAM:  General: No apparent distress  Respiratory: Lungs clear bilaterally  Cardiac: +S1, S2, no m/r/g  GI: +BS, soft, non tender, non distended  Extremities: No peripheral edema  Neuro: A+O X3    LABS:                        9.9    6.54  )-----------( 268      ( 16 Mar 2025 07:21 )             30.8     03-16    143  |  106  |  14.0  ----------------------------<  109[H]  3.9   |  27.0  |  0.43[L]    Ca    8.9      16 Mar 2025 07:21    TPro  6.1[L]  /  Alb  3.4  /  TBili  <0.2[L]  /  DBili  x   /  AST  26  /  ALT  29  /  AlkPhos  48  03-16    POCT Blood Glucose.: 148 mg/dL (03-17-25 @ 07:22)  POCT Blood Glucose.: 135 mg/dL (03-16-25 @ 21:44)  POCT Blood Glucose.: 133 mg/dL (03-16-25 @ 16:48)  POCT Blood Glucose.: 111 mg/dL (03-16-25 @ 11:05)    Triiodothyronine, Total (T3 Total): 83 ng/dL (03-08-25 @ 15:55)  Free Thyroxine, Serum: 1.2 ng/dL (03-08-25 @ 15:55)  Thyroid Stimulating Hormone, Serum: 1.11 uIU/mL (03-08-25 @ 15:55)

## 2025-03-17 NOTE — PROVIDER CONTACT NOTE (OTHER) - ASSESSMENT
Patient and vitals stable
PT is AOx4. pt is asymptomatic
pt c/o back pain 8/10. oxy 10 ordered but BP 96/60.

## 2025-03-17 NOTE — PROGRESS NOTE ADULT - NS ATTEND AMEND GEN_ALL_CORE FT
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 Admitted for seizures. Glucoses well controlled insulin pump therapy. Remaining plan as above.

## 2025-03-17 NOTE — DISCHARGE NOTE NURSING/CASE MANAGEMENT/SOCIAL WORK - FINANCIAL ASSISTANCE
[facility] provides services at a reduced cost to those who are determined to be eligible through [facility]’s financial assistance program. Information regarding [facility]’s financial assistance program can be found by going to [email or web address] or by calling [phone].

## 2025-03-17 NOTE — DISCHARGE NOTE NURSING/CASE MANAGEMENT/SOCIAL WORK - PATIENT PORTAL LINK FT
You can access the FollowMyHealth Patient Portal offered by [facility] by registering at the following website: [email or web address]. By joining [facility]’s FollowMyHealth portal, you will also be able to view your health information using other applications (apps) compatible with our system.

## 2025-03-17 NOTE — PROVIDER CONTACT NOTE (OTHER) - BACKGROUND
PT does not want the ace wrap on during the night and is not letting us put it back on. PT is aware that she has orthostatic BP and that the ace wrap is supposed to help.
pt has hx of orthostatic hypotension.
Patient admitted for seizure

## 2025-03-17 NOTE — DISCHARGE NOTE NURSING/CASE MANAGEMENT/SOCIAL WORK - NSDCPEFALRISK_GEN_ALL_CORE
For information on Fall & Injury Prevention, visit: [email or web address] OR  [email or web address] OR  https://www.cdc.gov/steadi/patient.html

## 2025-03-17 NOTE — PROGRESS NOTE ADULT - ASSESSMENT
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 Admitted for seizures. Of note, patient missed about 1-2 weeks of keppra due to meds not being renewed and only restarted on keppra recently. Consult for diabetes mgmt, a1c 7.    1. Moderately controlled T1DM  - Please check fingersticks before meals and at bedtime while on insulin pump  - Glucoses stable, may continue to use insulin pump, orders in place  - Insulin pump due to be changed today, 3/17  - If patient is off insulin pump for any reason, start lantus 4 units with low dose sliding scale with meals  - C-peptide 0.8 with glucose 108/JOSE and islet cell ab pending    2. Seizures  - On Keppra   - Care per neuro/primary team    3. Syncope/autonomic dysfunction  - Symptomatic hypotension, on midodrine  - TTE showed reduced EF with diastolic dysfunction

## 2025-03-18 LAB
ANION GAP SERPL CALC-SCNC: 12 MMOL/L — SIGNIFICANT CHANGE UP (ref 5–17)
BUN SERPL-MCNC: 12.2 MG/DL — SIGNIFICANT CHANGE UP (ref 8–20)
CALCIUM SERPL-MCNC: 8.9 MG/DL — SIGNIFICANT CHANGE UP (ref 8.4–10.5)
CHLORIDE SERPL-SCNC: 104 MMOL/L — SIGNIFICANT CHANGE UP (ref 96–108)
CO2 SERPL-SCNC: 26 MMOL/L — SIGNIFICANT CHANGE UP (ref 22–29)
CREAT SERPL-MCNC: 0.41 MG/DL — LOW (ref 0.5–1.3)
EGFR: 132 ML/MIN/1.73M2 — SIGNIFICANT CHANGE UP
EGFR: 132 ML/MIN/1.73M2 — SIGNIFICANT CHANGE UP
GAD65 AB SER-MCNC: 0 NMOL/L — SIGNIFICANT CHANGE UP
GLUCOSE BLDC GLUCOMTR-MCNC: 145 MG/DL — HIGH (ref 70–99)
GLUCOSE BLDC GLUCOMTR-MCNC: 181 MG/DL — HIGH (ref 70–99)
GLUCOSE BLDC GLUCOMTR-MCNC: 226 MG/DL — HIGH (ref 70–99)
GLUCOSE BLDC GLUCOMTR-MCNC: 99 MG/DL — SIGNIFICANT CHANGE UP (ref 70–99)
GLUCOSE SERPL-MCNC: 93 MG/DL — SIGNIFICANT CHANGE UP (ref 70–99)
HCT VFR BLD CALC: 31.2 % — LOW (ref 34.5–45)
HGB BLD-MCNC: 10.2 G/DL — LOW (ref 11.5–15.5)
MCHC RBC-ENTMCNC: 28.7 PG — SIGNIFICANT CHANGE UP (ref 27–34)
MCHC RBC-ENTMCNC: 32.7 G/DL — SIGNIFICANT CHANGE UP (ref 32–36)
MCV RBC AUTO: 87.6 FL — SIGNIFICANT CHANGE UP (ref 80–100)
NRBC # BLD AUTO: 0 K/UL — SIGNIFICANT CHANGE UP (ref 0–0)
NRBC # FLD: 0 K/UL — SIGNIFICANT CHANGE UP (ref 0–0)
NRBC BLD AUTO-RTO: 0 /100 WBCS — SIGNIFICANT CHANGE UP (ref 0–0)
PLATELET # BLD AUTO: 269 K/UL — SIGNIFICANT CHANGE UP (ref 150–400)
PMV BLD: 10.9 FL — SIGNIFICANT CHANGE UP (ref 7–13)
POTASSIUM SERPL-MCNC: 3.8 MMOL/L — SIGNIFICANT CHANGE UP (ref 3.5–5.3)
POTASSIUM SERPL-SCNC: 3.8 MMOL/L — SIGNIFICANT CHANGE UP (ref 3.5–5.3)
RBC # BLD: 3.56 M/UL — LOW (ref 3.8–5.2)
RBC # FLD: 12.1 % — SIGNIFICANT CHANGE UP (ref 10.3–14.5)
SODIUM SERPL-SCNC: 142 MMOL/L — SIGNIFICANT CHANGE UP (ref 135–145)
WBC # BLD: 6.8 K/UL — SIGNIFICANT CHANGE UP (ref 3.8–10.5)
WBC # FLD AUTO: 6.8 K/UL — SIGNIFICANT CHANGE UP (ref 3.8–10.5)

## 2025-03-18 PROCEDURE — 99232 SBSQ HOSP IP/OBS MODERATE 35: CPT

## 2025-03-18 RX ORDER — MIDODRINE HYDROCHLORIDE 5 MG/1
10 TABLET ORAL THREE TIMES A DAY
Refills: 0 | Status: DISCONTINUED | OUTPATIENT
Start: 2025-03-18 | End: 2025-03-19

## 2025-03-18 RX ADMIN — LIDOCAINE HYDROCHLORIDE 2 PATCH: 20 JELLY TOPICAL at 17:25

## 2025-03-18 RX ADMIN — MIDODRINE HYDROCHLORIDE 10 MILLIGRAM(S): 5 TABLET ORAL at 17:25

## 2025-03-18 RX ADMIN — Medication 1 TABLET(S): at 11:00

## 2025-03-18 RX ADMIN — MIDODRINE HYDROCHLORIDE 10 MILLIGRAM(S): 5 TABLET ORAL at 11:01

## 2025-03-18 RX ADMIN — SERTRALINE 50 MILLIGRAM(S): 100 TABLET, FILM COATED ORAL at 11:00

## 2025-03-18 RX ADMIN — Medication 5 MILLIGRAM(S): at 22:28

## 2025-03-18 RX ADMIN — METHOCARBAMOL 500 MILLIGRAM(S): 500 TABLET, FILM COATED ORAL at 13:56

## 2025-03-18 RX ADMIN — ROSUVASTATIN CALCIUM 5 MILLIGRAM(S): 5 TABLET, FILM COATED ORAL at 22:28

## 2025-03-18 RX ADMIN — OXYCODONE HYDROCHLORIDE 10 MILLIGRAM(S): 30 TABLET ORAL at 22:27

## 2025-03-18 RX ADMIN — LEVETIRACETAM 1000 MILLIGRAM(S): 10 INJECTION, SOLUTION INTRAVENOUS at 05:08

## 2025-03-18 RX ADMIN — LIDOCAINE HYDROCHLORIDE 2 PATCH: 20 JELLY TOPICAL at 05:00

## 2025-03-18 RX ADMIN — Medication 0.2 MILLIGRAM(S): at 06:40

## 2025-03-18 RX ADMIN — METHOCARBAMOL 500 MILLIGRAM(S): 500 TABLET, FILM COATED ORAL at 05:08

## 2025-03-18 RX ADMIN — OXYCODONE HYDROCHLORIDE 10 MILLIGRAM(S): 30 TABLET ORAL at 02:13

## 2025-03-18 RX ADMIN — METHOCARBAMOL 500 MILLIGRAM(S): 500 TABLET, FILM COATED ORAL at 22:28

## 2025-03-18 RX ADMIN — LEVETIRACETAM 1000 MILLIGRAM(S): 10 INJECTION, SOLUTION INTRAVENOUS at 17:25

## 2025-03-18 RX ADMIN — OXYCODONE HYDROCHLORIDE 10 MILLIGRAM(S): 30 TABLET ORAL at 13:56

## 2025-03-18 NOTE — PROGRESS NOTE ADULT - SUBJECTIVE AND OBJECTIVE BOX
[facility] Division of Hospital Medicine    pt seen and examined at bedside  rosendo  pt without complaints this am  tolerating po diet  nl stooling and urination    MEDICATIONS  (STANDING):  dextrose 5%. 1000 milliLiter(s) (100 mL/Hr) IV Continuous <Continuous>  dextrose 5%. 1000 milliLiter(s) (50 mL/Hr) IV Continuous <Continuous>  dextrose 50% Injectable 25 Gram(s) IV Push once  dextrose 50% Injectable 12.5 Gram(s) IV Push once  dextrose 50% Injectable 25 Gram(s) IV Push once  enoxaparin Injectable 40 milliGRAM(s) SubCutaneous every 24 hours  glucagon  Injectable 1 milliGRAM(s) IntraMuscular once  insulin lispro (HumaLOG) Pump 1 Each SubCutaneous Continuous Pump  levETIRAcetam 1000 milliGRAM(s) Oral two times a day  lidocaine   4% Patch 2 Patch Transdermal every 24 hours  melatonin 5 milliGRAM(s) Oral at bedtime  methocarbamol 500 milliGRAM(s) Oral three times a day  multivitamin 1 Tablet(s) Oral daily  polyethylene glycol 3350 17 Gram(s) Oral daily  rosuvastatin 5 milliGRAM(s) Oral at bedtime  sertraline 50 milliGRAM(s) Oral daily    MEDICATIONS  (PRN):  dextrose Oral Gel 15 Gram(s) Oral once PRN Blood Glucose LESS THAN 70 milliGRAM(s)/deciliter  midodrine. 10 milliGRAM(s) Oral three times a day PRN Supine SBP <110  oxyCODONE    IR 5 milliGRAM(s) Oral every 6 hours PRN Moderate Pain (4 - 6)  oxyCODONE    IR 10 milliGRAM(s) Oral every 6 hours PRN Severe Pain (7 - 10)        I&O's Summary      PHYSICAL EXAM:  Vital Signs Last 24 Hrs  T(C): 36.4 (18 Mar 2025 10:09), Max: 36.8 (17 Mar 2025 17:05)  T(F): 97.6 (18 Mar 2025 10:09), Max: 98.3 (17 Mar 2025 17:05)  HR: 96 (18 Mar 2025 10:09) (81 - 97)  BP: 94/65 (18 Mar 2025 10:09) (94/65 - 149/87)  BP(mean): --  RR: 18 (18 Mar 2025 10:09) (18 - 18)  SpO2: 98% (18 Mar 2025 10:09) (94% - 99%)    Parameters below as of 18 Mar 2025 04:53  Patient On (Oxygen Delivery Method): room air            CONSTITUTIONAL: NAD, well-groomed  ENMT: Moist oral mucosa, no pharyngeal injection or exudates; normal dentition  RESPIRATORY: Normal respiratory effort; lungs are clear to auscultation bilaterally  CARDIOVASCULAR: Regular rate and rhythm, normal S1 and S2, no murmur/rub/gallop; No lower extremity edema; Peripheral pulses are 2+ bilaterally  ABDOMEN: Nontender to palpation, normoactive bowel sounds, no rebound/guarding; No hepatosplenomegaly  MUSCLOSKELETAL:  Normal gait; no clubbing or cyanosis of digits; no joint swelling or tenderness to palpation  PSYCH: A+O to person, place, and time; affect appropriate  NEUROLOGY: CN 2-12 are intact and symmetric; no gross sensory deficits;   SKIN: No rashes; no palpable lesions    LABS:                        10.2   6.80  )-----------( 269      ( 18 Mar 2025 06:45 )             31.2     03-18    142  |  104  |  12.2  ----------------------------<  93  3.8   |  26.0  |  0.41[L]    Ca    8.9      18 Mar 2025 06:45            Urinalysis Basic - ( 18 Mar 2025 06:45 )    Color: x / Appearance: x / SG: x / pH: x  Gluc: 93 mg/dL / Ketone: x  / Bili: x / Urobili: x   Blood: x / Protein: x / Nitrite: x   Leuk Esterase: x / RBC: x / WBC x   Sq Epi: x / Non Sq Epi: x / Bacteria: x        CAPILLARY BLOOD GLUCOSE      POCT Blood Glucose.: 181 mg/dL (18 Mar 2025 10:59)  POCT Blood Glucose.: 99 mg/dL (18 Mar 2025 07:45)  POCT Blood Glucose.: 138 mg/dL (17 Mar 2025 22:10)  POCT Blood Glucose.: 91 mg/dL (17 Mar 2025 16:57)

## 2025-03-18 NOTE — PROGRESS NOTE ADULT - SUBJECTIVE AND OBJECTIVE BOX
INTERVAL EVENTS:  Follow up diabetes management, glucoses stable on insulin pump.    MEDICATIONS  (STANDING):  dextrose 5%. 1000 milliLiter(s) (100 mL/Hr) IV Continuous <Continuous>  dextrose 5%. 1000 milliLiter(s) (50 mL/Hr) IV Continuous <Continuous>  dextrose 50% Injectable 25 Gram(s) IV Push once  dextrose 50% Injectable 12.5 Gram(s) IV Push once  dextrose 50% Injectable 25 Gram(s) IV Push once  enoxaparin Injectable 40 milliGRAM(s) SubCutaneous every 24 hours  glucagon  Injectable 1 milliGRAM(s) IntraMuscular once  insulin lispro (HumaLOG) Pump 1 Each SubCutaneous Continuous Pump  levETIRAcetam 1000 milliGRAM(s) Oral two times a day  lidocaine   4% Patch 2 Patch Transdermal every 24 hours  melatonin 5 milliGRAM(s) Oral at bedtime  methocarbamol 500 milliGRAM(s) Oral three times a day  multivitamin 1 Tablet(s) Oral daily  polyethylene glycol 3350 17 Gram(s) Oral daily  rosuvastatin 5 milliGRAM(s) Oral at bedtime  sertraline 50 milliGRAM(s) Oral daily    MEDICATIONS  (PRN):  dextrose Oral Gel 15 Gram(s) Oral once PRN Blood Glucose LESS THAN 70 milliGRAM(s)/deciliter  midodrine. 10 milliGRAM(s) Oral three times a day PRN Supine SBP <110  oxyCODONE    IR 5 milliGRAM(s) Oral every 6 hours PRN Moderate Pain (4 - 6)  oxyCODONE    IR 10 milliGRAM(s) Oral every 6 hours PRN Severe Pain (7 - 10)    Allergies  No Known Allergies    Vital Signs Last 24 Hrs  T(C): 36.4 (18 Mar 2025 10:09), Max: 36.8 (17 Mar 2025 17:05)  T(F): 97.6 (18 Mar 2025 10:09), Max: 98.3 (17 Mar 2025 17:05)  HR: 96 (18 Mar 2025 10:09) (81 - 97)  BP: 94/65 (18 Mar 2025 10:09) (94/65 - 149/87)  BP(mean): --  RR: 18 (18 Mar 2025 10:09) (18 - 18)  SpO2: 98% (18 Mar 2025 10:09) (94% - 99%)    Parameters below as of 18 Mar 2025 04:53  Patient On (Oxygen Delivery Method): room air    PHYSICAL EXAM:  General: No apparent distress  Respiratory: Lungs clear bilaterally  Cardiac: +S1, S2, no m/r/g  GI: +BS, soft, non tender, non distended  Extremities: No peripheral edema  Neuro: A+O X3    LABS:                        10.2   6.80  )-----------( 269      ( 18 Mar 2025 06:45 )             31.2     03-18    142  |  104  |  12.2  ----------------------------<  93  3.8   |  26.0  |  0.41[L]    Ca    8.9      18 Mar 2025 06:45      Urinalysis Basic - ( 18 Mar 2025 06:45 )    Color: x / Appearance: x / SG: x / pH: x  Gluc: 93 mg/dL / Ketone: x  / Bili: x / Urobili: x   Blood: x / Protein: x / Nitrite: x   Leuk Esterase: x / RBC: x / WBC x   Sq Epi: x / Non Sq Epi: x / Bacteria: x    POCT Blood Glucose.: 181 mg/dL (03-18-25 @ 10:59)  POCT Blood Glucose.: 99 mg/dL (03-18-25 @ 07:45)  POCT Blood Glucose.: 138 mg/dL (03-17-25 @ 22:10)  POCT Blood Glucose.: 91 mg/dL (03-17-25 @ 16:57)    Triiodothyronine, Total (T3 Total): 83 ng/dL (03-08-25 @ 15:55)  Free Thyroxine, Serum: 1.2 ng/dL (03-08-25 @ 15:55)  Thyroid Stimulating Hormone, Serum: 1.11 uIU/mL (03-08-25 @ 15:55) INTERVAL EVENTS:  Follow up diabetes management, glucoses stable on insulin pump.    MEDICATIONS  (STANDING):  dextrose 5%. 1000 milliLiter(s) (100 mL/Hr) IV Continuous <Continuous>  dextrose 5%. 1000 milliLiter(s) (50 mL/Hr) IV Continuous <Continuous>  dextrose 50% Injectable 25 Gram(s) IV Push once  dextrose 50% Injectable 12.5 Gram(s) IV Push once  dextrose 50% Injectable 25 Gram(s) IV Push once  enoxaparin Injectable 40 milliGRAM(s) SubCutaneous every 24 hours  glucagon  Injectable 1 milliGRAM(s) IntraMuscular once  insulin lispro (HumaLOG) Pump 1 Each SubCutaneous Continuous Pump  levETIRAcetam 1000 milliGRAM(s) Oral two times a day  lidocaine   4% Patch 2 Patch Transdermal every 24 hours  melatonin 5 milliGRAM(s) Oral at bedtime  methocarbamol 500 milliGRAM(s) Oral three times a day  multivitamin 1 Tablet(s) Oral daily  polyethylene glycol 3350 17 Gram(s) Oral daily  rosuvastatin 5 milliGRAM(s) Oral at bedtime  sertraline 50 milliGRAM(s) Oral daily    MEDICATIONS  (PRN):  dextrose Oral Gel 15 Gram(s) Oral once PRN Blood Glucose LESS THAN 70 milliGRAM(s)/deciliter  midodrine. 10 milliGRAM(s) Oral three times a day PRN Supine SBP <110  oxyCODONE    IR 5 milliGRAM(s) Oral every 6 hours PRN Moderate Pain (4 - 6)  oxyCODONE    IR 10 milliGRAM(s) Oral every 6 hours PRN Severe Pain (7 - 10)    Allergies  No Known Allergies    Vital Signs Last 24 Hrs  T(C): 36.4 (18 Mar 2025 10:09), Max: 36.8 (17 Mar 2025 17:05)  T(F): 97.6 (18 Mar 2025 10:09), Max: 98.3 (17 Mar 2025 17:05)  HR: 96 (18 Mar 2025 10:09) (81 - 97)  BP: 94/65 (18 Mar 2025 10:09) (94/65 - 149/87)  BP(mean): --  RR: 18 (18 Mar 2025 10:09) (18 - 18)  SpO2: 98% (18 Mar 2025 10:09) (94% - 99%)    Parameters below as of 18 Mar 2025 04:53  Patient On (Oxygen Delivery Method): room air    PHYSICAL EXAM:  General: No apparent distress  Respiratory: Lungs clear bilaterally  Cardiac: +S1, S2, no m/r/g  GI: +BS, soft, non tender, non distended  Extremities: No peripheral edema  Neuro: A+O X3    LABS:                        10.2   6.80  )-----------( 269      ( 18 Mar 2025 06:45 )             31.2     03-18    142  |  104  |  12.2  ----------------------------<  93  3.8   |  26.0  |  0.41[L]    Ca    8.9      18 Mar 2025 06:45    POCT Blood Glucose.: 181 mg/dL (03-18-25 @ 10:59)  POCT Blood Glucose.: 99 mg/dL (03-18-25 @ 07:45)  POCT Blood Glucose.: 138 mg/dL (03-17-25 @ 22:10)  POCT Blood Glucose.: 91 mg/dL (03-17-25 @ 16:57)    Triiodothyronine, Total (T3 Total): 83 ng/dL (03-08-25 @ 15:55)  Free Thyroxine, Serum: 1.2 ng/dL (03-08-25 @ 15:55)  Thyroid Stimulating Hormone, Serum: 1.11 uIU/mL (03-08-25 @ 15:55)

## 2025-03-18 NOTE — PROGRESS NOTE ADULT - NS ATTEND AMEND GEN_ALL_CORE FT
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 Admitted for seizures. Glucoses well controlled insulin pump therapy. Remaining plan as above.

## 2025-03-18 NOTE — PROGRESS NOTE ADULT - SUBJECTIVE AND OBJECTIVE BOX
CC: Patient being seen for rehabilitation follow up.  Patient reports she was dizzy while on the commode.  Reemphasized need to be OOB all day.    FUNCTIONAL PROGRESS  3/16 PT  Bed Mobility  Bed Mobility Training Supine-to-Sit: minimum assist (75% patient effort);  verbal cues;  nonverbal cues (demo/gestures);  1 person assist;  bed rails  Bed Mobility Training Limitations: decreased strength;  decreased ability to use legs for bridging/pushing;  impaired balance    Sit-Stand Transfer Training  Transfer Training Sit-to-Stand Transfer: minimum assist (75% patient effort);  verbal cues;  nonverbal cues (demo/gestures);  1 person assist;  full weight-bearing   rolling walker  Transfer Training Stand-to-Sit Transfer: minimum assist (75% patient effort);  1 person assist;  nonverbal cues (demo/gestures);  verbal cues;  full weight-bearing   rolling walker  Sit-to-Stand Transfer Training Transfer Safety Analysis: decreased weight-shifting ability;  decreased step length;  decreased strength;  impaired balance    Gait Training  Gait Training: minimum assist (75% patient effort);  1 person assist;  nonverbal cues (demo/gestures);  verbal cues;  full weight-bearing   rolling walker;  35ft- limited by dizziness /71  Gait Analysis: 3-point gait   decreased yang;  decreased step length;  pain;  decreased strength;  impaired balance    3/16 OT    Bathing Training:     · Level of [place]	moderate assist (50% patients effort)    Upper Body Dressing Training:     · Level of [place]	minimum assist (75% patients effort); moderate assist (50% patients effort)    Lower Body Dressing Training:     · Level of [place]	maximum assist (25% patients effort)  · Physical Assist/Nonphysical Assist	pt would benefit from LB device training    Toilet Hygiene Training:     · Level of [place]	to assess    Grooming Training:     · Level of [place]	minimum assist (75% patients effort)    Eating/Self-Feeding Training:     · Level of [place]	Did not directly observe      VITALS  T(C): 36.4 (03-18-25 @ 04:53), Max: 36.8 (03-17-25 @ 10:35)  HR: 93 (03-18-25 @ 06:35) (81 - 97)  BP: 125/80 (03-18-25 @ 06:35) (110/68 - 149/87)  RR: 18 (03-18-25 @ 04:53) (18 - 18)  SpO2: 96% (03-18-25 @ 04:53) (94% - 100%)  Wt(kg): --    MEDICATIONS   dextrose 5%. 1000 milliLiter(s) <Continuous>  dextrose 5%. 1000 milliLiter(s) <Continuous>  dextrose 50% Injectable 25 Gram(s) once  dextrose 50% Injectable 12.5 Gram(s) once  dextrose 50% Injectable 25 Gram(s) once  dextrose Oral Gel 15 Gram(s) once PRN  enoxaparin Injectable 40 milliGRAM(s) every 24 hours  glucagon  Injectable 1 milliGRAM(s) once  HYDROmorphone  Injectable 0.2 milliGRAM(s) every 8 hours PRN  insulin lispro (HumaLOG) Pump 1 Each Continuous Pump  levETIRAcetam 1000 milliGRAM(s) two times a day  lidocaine   4% Patch 2 Patch every 24 hours  melatonin 5 milliGRAM(s) at bedtime  methocarbamol 500 milliGRAM(s) three times a day  midodrine. 10 milliGRAM(s) three times a day PRN  multivitamin 1 Tablet(s) daily  oxyCODONE    IR 5 milliGRAM(s) every 6 hours PRN  oxyCODONE    IR 10 milliGRAM(s) every 6 hours PRN  polyethylene glycol 3350 17 Gram(s) daily  rosuvastatin 5 milliGRAM(s) at bedtime  sertraline 50 milliGRAM(s) daily      RECENT LABS/IMAGING  - Reviewed Today                        10.2   6.80  )-----------( 269      ( 18 Mar 2025 06:45 )             31.2     03-18    142  |  104  |  12.2  ----------------------------<  93  3.8   |  26.0  |  0.41[L]    Ca    8.9      18 Mar 2025 06:45        Urinalysis Basic - ( 18 Mar 2025 06:45 )    Color: x / Appearance: x / SG: x / pH: x  Gluc: 93 mg/dL / Ketone: x  / Bili: x / Urobili: x   Blood: x / Protein: x / Nitrite: x   Leuk Esterase: x / RBC: x / WBC x   Sq Epi: x / Non Sq Epi: x / Bacteria: x                HEAD CT 3/10 -  No acute intracranial hemorrhage, mass effect, or shift of the midline structures. Unchanged nonspecific adenoidal hypertrophy.    TTE 3/10 -  1. Left atrium is normal in size.   2. Left ventricular systolic function is mildly decreased with an ejection fraction of 46 % by Grey's method of disks. Global left ventricular hypokinesis.   3. Left ventricular global longitudinal strain is -11.0 % is abnormal (> -16%).   4. There is mild (grade 1) left ventricular diastolic dysfunction.   5. The right atrium is normal in size.   6. Normal right ventricular cavity size and normal right ventricular systolic function.   7. No significant valvular disease.   8. No pericardial effusion seen.   9. 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      Consider Cardiac CT angiography to assess for anomalous coronary artery if clinically indicated.          ----------------------------------------------------------------------------------------  PHYSICAL EXAM  Constitutional - NAD, Appears Comfortable    Neuro - AAOx4  Psychiatric - Fatigued  ----------------------------------------------------------------------------------------  ASSESSMENT/PLAN  35yFemale with functional deficits after developing breakthrough seizures  Breakthrough Seizures - Keppra  Syncope/Orthostatic Hypotension/HTN - Midodrine PRN  DM1 - Humalog Pump, ISS  CAD - Crestor  Mood D/O - Zoloft  Sleep - Melatonin   Pain - Tylenol, Oxycodone, Lidoderm, Robaxin, DC IV DILAUDID  DVT PPX - SCDs, Lovenox  Rehab/Impaired mobility and function - Patient continues to require hospitalization for the above diagnoses and ongoing active management of comorbid complications that are substantially posing a threat to bodily function, functional ability and quality of life.     RECOMMEND - OOB daily     Patient NOT medically optimized for AR.  Patient with ongoing low BP and could not tolerate rehab program nor participate.  Patient also on IV meds.  If patient does not advance medically/meds not optimized, may need to consider alternate discharge plans.

## 2025-03-18 NOTE — PROGRESS NOTE ADULT - ASSESSMENT
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 Admitted for seizures. Of note, patient missed about 1-2 weeks of keppra due to meds not being renewed and only restarted on keppra recently. Consult for diabetes mgmt, a1c 7.    1. Moderately controlled T1DM  - Please check fingersticks before meals and at bedtime while on insulin pump  - Glucoses stable, may continue to use insulin pump, orders in place  - Insulin pump changed 3/17, has dexcom sensor in place  - If patient is off insulin pump for any reason, start lantus 4 units with low dose sliding scale with meals  - C-peptide 0.8 with glucose 108/JOSE and islet cell ab are negative    2. Seizures  - On Keppra   - Care per neuro/primary team    3. Syncope/autonomic dysfunction  - Symptomatic hypotension, on midodrine  - TTE showed reduced EF with diastolic dysfunction  - Needs acute rehab when medically optimized

## 2025-03-19 LAB
CORTIS SERPL-MCNC: 7.3 UG/DL — SIGNIFICANT CHANGE UP
GLUCOSE BLDC GLUCOMTR-MCNC: 130 MG/DL — HIGH (ref 70–99)
GLUCOSE BLDC GLUCOMTR-MCNC: 149 MG/DL — HIGH (ref 70–99)
GLUCOSE BLDC GLUCOMTR-MCNC: 168 MG/DL — HIGH (ref 70–99)
GLUCOSE BLDC GLUCOMTR-MCNC: 75 MG/DL — SIGNIFICANT CHANGE UP (ref 70–99)
T4 FREE SERPL-MCNC: 1.1 NG/DL — SIGNIFICANT CHANGE UP (ref 0.9–1.7)
TSH SERPL-MCNC: 0.87 UIU/ML — SIGNIFICANT CHANGE UP (ref 0.27–4.2)
TSH SERPL-MCNC: 1.49 UIU/ML — SIGNIFICANT CHANGE UP (ref 0.27–4.2)

## 2025-03-19 PROCEDURE — 99233 SBSQ HOSP IP/OBS HIGH 50: CPT

## 2025-03-19 PROCEDURE — 99232 SBSQ HOSP IP/OBS MODERATE 35: CPT

## 2025-03-19 RX ORDER — FLUDROCORTISONE ACETATE 0.1 MG
0.05 TABLET ORAL DAILY
Refills: 0 | Status: DISCONTINUED | OUTPATIENT
Start: 2025-03-19 | End: 2025-03-21

## 2025-03-19 RX ORDER — OXYCODONE HYDROCHLORIDE 30 MG/1
5 TABLET ORAL EVERY 6 HOURS
Refills: 0 | Status: DISCONTINUED | OUTPATIENT
Start: 2025-03-19 | End: 2025-03-21

## 2025-03-19 RX ORDER — ACETAMINOPHEN 500 MG/5ML
725 LIQUID (ML) ORAL ONCE
Refills: 0 | Status: COMPLETED | OUTPATIENT
Start: 2025-03-19 | End: 2025-03-19

## 2025-03-19 RX ORDER — MIDODRINE HYDROCHLORIDE 5 MG/1
15 TABLET ORAL THREE TIMES A DAY
Refills: 0 | Status: DISCONTINUED | OUTPATIENT
Start: 2025-03-19 | End: 2025-03-24

## 2025-03-19 RX ORDER — OXYCODONE HYDROCHLORIDE 30 MG/1
10 TABLET ORAL EVERY 6 HOURS
Refills: 0 | Status: DISCONTINUED | OUTPATIENT
Start: 2025-03-19 | End: 2025-03-21

## 2025-03-19 RX ADMIN — ROSUVASTATIN CALCIUM 5 MILLIGRAM(S): 5 TABLET, FILM COATED ORAL at 21:48

## 2025-03-19 RX ADMIN — METHOCARBAMOL 500 MILLIGRAM(S): 500 TABLET, FILM COATED ORAL at 12:49

## 2025-03-19 RX ADMIN — MIDODRINE HYDROCHLORIDE 10 MILLIGRAM(S): 5 TABLET ORAL at 12:49

## 2025-03-19 RX ADMIN — MIDODRINE HYDROCHLORIDE 15 MILLIGRAM(S): 5 TABLET ORAL at 20:49

## 2025-03-19 RX ADMIN — LEVETIRACETAM 1000 MILLIGRAM(S): 10 INJECTION, SOLUTION INTRAVENOUS at 18:19

## 2025-03-19 RX ADMIN — METHOCARBAMOL 500 MILLIGRAM(S): 500 TABLET, FILM COATED ORAL at 21:48

## 2025-03-19 RX ADMIN — LEVETIRACETAM 1000 MILLIGRAM(S): 10 INJECTION, SOLUTION INTRAVENOUS at 05:15

## 2025-03-19 RX ADMIN — ENOXAPARIN SODIUM 40 MILLIGRAM(S): 100 INJECTION SUBCUTANEOUS at 00:32

## 2025-03-19 RX ADMIN — ENOXAPARIN SODIUM 40 MILLIGRAM(S): 100 INJECTION SUBCUTANEOUS at 21:47

## 2025-03-19 RX ADMIN — OXYCODONE HYDROCHLORIDE 10 MILLIGRAM(S): 30 TABLET ORAL at 18:19

## 2025-03-19 RX ADMIN — LIDOCAINE HYDROCHLORIDE 2 PATCH: 20 JELLY TOPICAL at 18:20

## 2025-03-19 RX ADMIN — SERTRALINE 50 MILLIGRAM(S): 100 TABLET, FILM COATED ORAL at 12:49

## 2025-03-19 RX ADMIN — OXYCODONE HYDROCHLORIDE 10 MILLIGRAM(S): 30 TABLET ORAL at 12:49

## 2025-03-19 RX ADMIN — METHOCARBAMOL 500 MILLIGRAM(S): 500 TABLET, FILM COATED ORAL at 05:16

## 2025-03-19 RX ADMIN — OXYCODONE HYDROCHLORIDE 10 MILLIGRAM(S): 30 TABLET ORAL at 13:33

## 2025-03-19 RX ADMIN — Medication 0.05 MILLIGRAM(S): at 18:19

## 2025-03-19 RX ADMIN — Medication 5 MILLIGRAM(S): at 21:48

## 2025-03-19 RX ADMIN — Medication 1 TABLET(S): at 12:49

## 2025-03-19 RX ADMIN — MIDODRINE HYDROCHLORIDE 10 MILLIGRAM(S): 5 TABLET ORAL at 05:16

## 2025-03-19 RX ADMIN — OXYCODONE HYDROCHLORIDE 10 MILLIGRAM(S): 30 TABLET ORAL at 02:25

## 2025-03-19 RX ADMIN — OXYCODONE HYDROCHLORIDE 10 MILLIGRAM(S): 30 TABLET ORAL at 05:16

## 2025-03-19 RX ADMIN — OXYCODONE HYDROCHLORIDE 10 MILLIGRAM(S): 30 TABLET ORAL at 18:50

## 2025-03-19 NOTE — PROGRESS NOTE ADULT - SUBJECTIVE AND OBJECTIVE BOX
CC: Patient being seen for rehabilitation follow up.  Patient reports she has bene out of bed without dizziness or low BP.    FUNCTIONAL PROGRESS  Pending reevals    VITALS  T(C): 37.2 (03-19-25 @ 04:58), Max: 37.2 (03-19-25 @ 04:58)  HR: 97 (03-19-25 @ 04:58) (96 - 103)  BP: 135/67 (03-19-25 @ 04:58) (94/65 - 149/94)  RR: 18 (03-19-25 @ 04:58) (18 - 18)  SpO2: 97% (03-19-25 @ 04:58) (97% - 98%)  Wt(kg): --    MEDICATIONS   dextrose 5%. 1000 milliLiter(s) <Continuous>  dextrose 5%. 1000 milliLiter(s) <Continuous>  dextrose 50% Injectable 25 Gram(s) once  dextrose 50% Injectable 12.5 Gram(s) once  dextrose 50% Injectable 25 Gram(s) once  dextrose Oral Gel 15 Gram(s) once PRN  enoxaparin Injectable 40 milliGRAM(s) every 24 hours  glucagon  Injectable 1 milliGRAM(s) once  insulin lispro (HumaLOG) Pump 1 Each Continuous Pump  levETIRAcetam 1000 milliGRAM(s) two times a day  lidocaine   4% Patch 2 Patch every 24 hours  melatonin 5 milliGRAM(s) at bedtime  methocarbamol 500 milliGRAM(s) three times a day  midodrine. 10 milliGRAM(s) three times a day  multivitamin 1 Tablet(s) daily  oxyCODONE    IR 5 milliGRAM(s) every 6 hours PRN  oxyCODONE    IR 10 milliGRAM(s) every 6 hours PRN  polyethylene glycol 3350 17 Gram(s) daily  rosuvastatin 5 milliGRAM(s) at bedtime  sertraline 50 milliGRAM(s) daily      RECENT LABS/IMAGING  - Reviewed Today                        10.2   6.80  )-----------( 269      ( 18 Mar 2025 06:45 )             31.2     03-18    142  |  104  |  12.2  ----------------------------<  93  3.8   |  26.0  |  0.41[L]    Ca    8.9      18 Mar 2025 06:45        Urinalysis Basic - ( 18 Mar 2025 06:45 )    Color: x / Appearance: x / SG: x / pH: x  Gluc: 93 mg/dL / Ketone: x  / Bili: x / Urobili: x   Blood: x / Protein: x / Nitrite: x   Leuk Esterase: x / RBC: x / WBC x   Sq Epi: x / Non Sq Epi: x / Bacteria: x                      HEAD CT 3/10 -  No acute intracranial hemorrhage, mass effect, or shift of the midline structures. Unchanged nonspecific adenoidal hypertrophy.    TTE 3/10 -  1. Left atrium is normal in size.   2. Left ventricular systolic function is mildly decreased with an ejection fraction of 46 % by Grey's method of disks. Global left ventricular hypokinesis.   3. Left ventricular global longitudinal strain is -11.0 % is abnormal (> -16%).   4. There is mild (grade 1) left ventricular diastolic dysfunction.   5. The right atrium is normal in size.   6. Normal right ventricular cavity size and normal right ventricular systolic function.   7. No significant valvular disease.   8. No pericardial effusion seen.   9. 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      Consider Cardiac CT angiography to assess for anomalous coronary artery if clinically indicated.          ----------------------------------------------------------------------------------------  PHYSICAL EXAM  Constitutional - NAD, Appears Comfortable    Neuro - AAOx4  Psychiatric - Fatigued  ----------------------------------------------------------------------------------------  ASSESSMENT/PLAN  35yFemale with functional deficits after developing breakthrough seizures  Breakthrough Seizures - Keppra  Syncope/Orthostatic Hypotension/HTN - Midodrine PRN  DM1 - Humalog Pump, ISS  CAD - Crestor  Mood D/O - Zoloft  Sleep - Melatonin   Pain - Tylenol, Oxycodone, Lidoderm, Robaxin, DC IV DILAUDID  DVT PPX - SCDs, Lovenox  Rehab/Impaired mobility and function - Patient continues to require hospitalization for the above diagnoses and ongoing active management of comorbid complications that are substantially posing a threat to bodily function, functional ability and quality of life.     RECOMMEND - OOB daily     Patient needs reeval by PT and OT to consider for AR planning.   If patient does not advance medically/meds not optimized, may need to consider alternate discharge plans.

## 2025-03-19 NOTE — PROGRESS NOTE ADULT - ASSESSMENT
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 Admitted for seizures. Of note, patient missed about 1-2 weeks of keppra due to meds not being renewed and only restarted on keppra recently. Consult for diabetes mgmt, a1c 7.    1. Moderately controlled T1DM  - Episode of hyperglycemia yesterday  --> pt ate [employer]  - May continue to use insulin pump, orders in place  - Please check fingersticks before meals and at bedtime while on insulin pump  - Insulin pump changed 3/17, has dexcom sensor in place  - If patient is off insulin pump for any reason, start lantus 4 units with low dose sliding scale with meals  - C-peptide 0.8 with glucose 108/JOSE and islet cell ab are negative    2. Seizures  - On Keppra   - Care per neuro/primary team    3. Syncope/autonomic dysfunction  - Symptomatic hypotension, on midodrine  - TTE showed reduced EF with diastolic dysfunction  - Needs acute rehab when medically optimized

## 2025-03-19 NOTE — PROGRESS NOTE ADULT - SUBJECTIVE AND OBJECTIVE BOX
[facility] Division of Hospital Medicine    pt seen and examined at bedside  still having lightheadedness when ambulating  tsh and cortisol levls normal  no cp, sob, abd pain  tolerating po diet    MEDICATIONS  (STANDING):  dextrose 5%. 1000 milliLiter(s) (50 mL/Hr) IV Continuous <Continuous>  dextrose 5%. 1000 milliLiter(s) (100 mL/Hr) IV Continuous <Continuous>  dextrose 50% Injectable 25 Gram(s) IV Push once  dextrose 50% Injectable 12.5 Gram(s) IV Push once  dextrose 50% Injectable 25 Gram(s) IV Push once  enoxaparin Injectable 40 milliGRAM(s) SubCutaneous every 24 hours  glucagon  Injectable 1 milliGRAM(s) IntraMuscular once  insulin lispro (HumaLOG) Pump 1 Each SubCutaneous Continuous Pump  levETIRAcetam 1000 milliGRAM(s) Oral two times a day  lidocaine   4% Patch 2 Patch Transdermal every 24 hours  melatonin 5 milliGRAM(s) Oral at bedtime  methocarbamol 500 milliGRAM(s) Oral three times a day  midodrine. 10 milliGRAM(s) Oral three times a day  multivitamin 1 Tablet(s) Oral daily  polyethylene glycol 3350 17 Gram(s) Oral daily  rosuvastatin 5 milliGRAM(s) Oral at bedtime  sertraline 50 milliGRAM(s) Oral daily    MEDICATIONS  (PRN):  dextrose Oral Gel 15 Gram(s) Oral once PRN Blood Glucose LESS THAN 70 milliGRAM(s)/deciliter  oxyCODONE    IR 5 milliGRAM(s) Oral every 6 hours PRN Moderate Pain (4 - 6)  oxyCODONE    IR 10 milliGRAM(s) Oral every 6 hours PRN Severe Pain (7 - 10)        I&O's Summary      PHYSICAL EXAM:  Vital Signs Last 24 Hrs  T(C): 36.5 (19 Mar 2025 10:45), Max: 37.2 (19 Mar 2025 04:58)  T(F): 97.7 (19 Mar 2025 10:45), Max: 99 (19 Mar 2025 04:58)  HR: 94 (19 Mar 2025 10:46) (94 - 103)  BP: 137/87 (19 Mar 2025 10:46) (87/59 - 149/94)  BP(mean): --  RR: 18 (19 Mar 2025 10:45) (18 - 18)  SpO2: 100% (19 Mar 2025 10:45) (97% - 100%)    Parameters below as of 19 Mar 2025 10:45  Patient On (Oxygen Delivery Method): room air            CONSTITUTIONAL: NAD, well-groomed  ENMT: Moist oral mucosa, no pharyngeal injection or exudates; normal dentition  RESPIRATORY: Normal respiratory effort; lungs are clear to auscultation bilaterally  CARDIOVASCULAR: Regular rate and rhythm, normal S1 and S2, no murmur/rub/gallop; No lower extremity edema; Peripheral pulses are 2+ bilaterally  ABDOMEN: Nontender to palpation, normoactive bowel sounds, no rebound/guarding; No hepatosplenomegaly  MUSCLOSKELETAL:  Normal gait; no clubbing or cyanosis of digits; no joint swelling or tenderness to palpation  PSYCH: A+O to person, place, and time; affect appropriate  NEUROLOGY: CN 2-12 are intact and symmetric; no gross sensory deficits;   SKIN: No rashes; no palpable lesions    LABS:                        10.2   6.80  )-----------( 269      ( 18 Mar 2025 06:45 )             31.2     03-18    142  |  104  |  12.2  ----------------------------<  93  3.8   |  26.0  |  0.41[L]    Ca    8.9      18 Mar 2025 06:45            Urinalysis Basic - ( 18 Mar 2025 06:45 )    Color: x / Appearance: x / SG: x / pH: x  Gluc: 93 mg/dL / Ketone: x  / Bili: x / Urobili: x   Blood: x / Protein: x / Nitrite: x   Leuk Esterase: x / RBC: x / WBC x   Sq Epi: x / Non Sq Epi: x / Bacteria: x        CAPILLARY BLOOD GLUCOSE      POCT Blood Glucose.: 75 mg/dL (19 Mar 2025 11:23)  POCT Blood Glucose.: 149 mg/dL (19 Mar 2025 09:14)  POCT Blood Glucose.: 226 mg/dL (18 Mar 2025 22:21)  POCT Blood Glucose.: 145 mg/dL (18 Mar 2025 17:24)

## 2025-03-19 NOTE — PROGRESS NOTE ADULT - SUBJECTIVE AND OBJECTIVE BOX
INTERVAL EVENTS:  Follow up diabetes management, on insulin pump. Denies acute pain at time of exam.     MEDICATIONS  (STANDING):  dextrose 5%. 1000 milliLiter(s) (50 mL/Hr) IV Continuous <Continuous>  dextrose 5%. 1000 milliLiter(s) (100 mL/Hr) IV Continuous <Continuous>  dextrose 50% Injectable 25 Gram(s) IV Push once  dextrose 50% Injectable 12.5 Gram(s) IV Push once  dextrose 50% Injectable 25 Gram(s) IV Push once  enoxaparin Injectable 40 milliGRAM(s) SubCutaneous every 24 hours  glucagon  Injectable 1 milliGRAM(s) IntraMuscular once  insulin lispro (HumaLOG) Pump 1 Each SubCutaneous Continuous Pump  levETIRAcetam 1000 milliGRAM(s) Oral two times a day  lidocaine   4% Patch 2 Patch Transdermal every 24 hours  melatonin 5 milliGRAM(s) Oral at bedtime  methocarbamol 500 milliGRAM(s) Oral three times a day  midodrine. 10 milliGRAM(s) Oral three times a day  multivitamin 1 Tablet(s) Oral daily  polyethylene glycol 3350 17 Gram(s) Oral daily  rosuvastatin 5 milliGRAM(s) Oral at bedtime  sertraline 50 milliGRAM(s) Oral daily    MEDICATIONS  (PRN):  dextrose Oral Gel 15 Gram(s) Oral once PRN Blood Glucose LESS THAN 70 milliGRAM(s)/deciliter  oxyCODONE    IR 5 milliGRAM(s) Oral every 6 hours PRN Moderate Pain (4 - 6)  oxyCODONE    IR 10 milliGRAM(s) Oral every 6 hours PRN Severe Pain (7 - 10)    Allergies  No Known Allergies    Vital Signs Last 24 Hrs  T(C): 37.2 (19 Mar 2025 04:58), Max: 37.2 (19 Mar 2025 04:58)  T(F): 99 (19 Mar 2025 04:58), Max: 99 (19 Mar 2025 04:58)  HR: 97 (19 Mar 2025 04:58) (96 - 103)  BP: 135/67 (19 Mar 2025 04:58) (94/65 - 149/94)  BP(mean): --  RR: 18 (19 Mar 2025 04:58) (18 - 18)  SpO2: 97% (19 Mar 2025 04:58) (97% - 98%)    Parameters below as of 19 Mar 2025 04:58  Patient On (Oxygen Delivery Method): room air    PHYSICAL EXAM:  General: No apparent distress  Respiratory: Lungs clear bilaterally  Cardiac: +S1, S2, no m/r/g  GI: +BS, soft, non tender, non distended  Extremities: No peripheral edema  Neuro: A+O X3    LABS:                        10.2   6.80  )-----------( 269      ( 18 Mar 2025 06:45 )             31.2     03-18    142  |  104  |  12.2  ----------------------------<  93  3.8   |  26.0  |  0.41[L]    Ca    8.9      18 Mar 2025 06:45      Urinalysis Basic - ( 18 Mar 2025 06:45 )    Color: x / Appearance: x / SG: x / pH: x  Gluc: 93 mg/dL / Ketone: x  / Bili: x / Urobili: x   Blood: x / Protein: x / Nitrite: x   Leuk Esterase: x / RBC: x / WBC x   Sq Epi: x / Non Sq Epi: x / Bacteria: x    POCT Blood Glucose.: 149 mg/dL (03-19-25 @ 09:14)  POCT Blood Glucose.: 226 mg/dL (03-18-25 @ 22:21)  POCT Blood Glucose.: 145 mg/dL (03-18-25 @ 17:24)  POCT Blood Glucose.: 181 mg/dL (03-18-25 @ 10:59)    Triiodothyronine, Total (T3 Total): 83 ng/dL (03-08-25 @ 15:55)  Free Thyroxine, Serum: 1.2 ng/dL (03-08-25 @ 15:55)  Thyroid Stimulating Hormone, Serum: 1.11 uIU/mL (03-08-25 @ 15:55)

## 2025-03-19 NOTE — PROGRESS NOTE ADULT - ASSESSMENT
34 year old female who is being managed as an inpatient for breakthrough seizure and syncope. She has a past medical history of type I DM on insulin pump, TIA, seizure disorder. Prior to hospitalization, she was recently diagnosed with new onset CHF without a clear etiology, and she has been in and out of the hospital since November. She has not fully recovered and has been having increased difficulty walking. Patient is being managed as an inpatient for breakthrough seizure requiring vEEG.     Syncope  Orthostatic hypotension likely secondary to autonomic dysfunction  Heart failure with reduced ejection fracture  - CT head negative on 3/8 and 3/10  - TTE showing LVEF is mildly decreased at 46%, global left ventricular hypokinesis  - Carotid doppler was unremarkable; Lyme serology negative  - Alert and oriented x3, answers questions and engages in conversation  - Likely due to uncontrolled DM which caused autonomic dysfunction  - Discontinued metoprolol  - Continue fall precautions  - Continue compression stockings and abdominal binder (needs appropriate size)  - will inc midodrine dose to 15mg tid  - will add fludrocortisone  - Cardiology and Neurology following    Breakthrough seizure  Leukocytosis likely related to seizure, resolved  - s/p Keppra 1000mg IV once  - White count normalized, afebrile  - CT head negative on 3/8 and 3/10  - vEEG showing no new seizures  - Continue seizure precautions  - Continue Keppra 1000mg PO BID  - Neurology following, recs noted    Type I DM  - A1c level 7  - Patient has insulin pump  - C-peptide 0.8 with glucose 108/JOSE and islet cell ab pending  - Endocrinology following and managing    Urinary tract infection  - s/p Rocephin for total 3 days    MDD  - Continue Zoloft    HLD  - Continue Crestor      DVT/GI ppx: Lovenox  Diet: Carb consistent  Code Status: Full code  Dispo: may not be a candidate for AR at this time

## 2025-03-20 LAB
ALBUMIN SERPL ELPH-MCNC: 3.5 G/DL — SIGNIFICANT CHANGE UP (ref 3.3–5.2)
ALP SERPL-CCNC: 53 U/L — SIGNIFICANT CHANGE UP (ref 40–120)
ALT FLD-CCNC: 60 U/L — HIGH
ANION GAP SERPL CALC-SCNC: 10 MMOL/L — SIGNIFICANT CHANGE UP (ref 5–17)
AST SERPL-CCNC: 35 U/L — HIGH
BILIRUB SERPL-MCNC: <0.2 MG/DL — LOW (ref 0.4–2)
BUN SERPL-MCNC: 17.9 MG/DL — SIGNIFICANT CHANGE UP (ref 8–20)
CALCIUM SERPL-MCNC: 8.7 MG/DL — SIGNIFICANT CHANGE UP (ref 8.4–10.5)
CHLORIDE SERPL-SCNC: 106 MMOL/L — SIGNIFICANT CHANGE UP (ref 96–108)
CO2 SERPL-SCNC: 26 MMOL/L — SIGNIFICANT CHANGE UP (ref 22–29)
CORTIS SERPL-MCNC: 5.9 UG/DL — SIGNIFICANT CHANGE UP
CREAT SERPL-MCNC: 0.51 MG/DL — SIGNIFICANT CHANGE UP (ref 0.5–1.3)
EGFR: 125 ML/MIN/1.73M2 — SIGNIFICANT CHANGE UP
EGFR: 125 ML/MIN/1.73M2 — SIGNIFICANT CHANGE UP
GLUCOSE BLDC GLUCOMTR-MCNC: 121 MG/DL — HIGH (ref 70–99)
GLUCOSE BLDC GLUCOMTR-MCNC: 148 MG/DL — HIGH (ref 70–99)
GLUCOSE BLDC GLUCOMTR-MCNC: 154 MG/DL — HIGH (ref 70–99)
GLUCOSE BLDC GLUCOMTR-MCNC: 170 MG/DL — HIGH (ref 70–99)
GLUCOSE SERPL-MCNC: 145 MG/DL — HIGH (ref 70–99)
HCT VFR BLD CALC: 30.5 % — LOW (ref 34.5–45)
HGB BLD-MCNC: 9.7 G/DL — LOW (ref 11.5–15.5)
MCHC RBC-ENTMCNC: 28.4 PG — SIGNIFICANT CHANGE UP (ref 27–34)
MCHC RBC-ENTMCNC: 31.8 G/DL — LOW (ref 32–36)
MCV RBC AUTO: 89.4 FL — SIGNIFICANT CHANGE UP (ref 80–100)
NRBC # BLD AUTO: 0 K/UL — SIGNIFICANT CHANGE UP (ref 0–0)
NRBC # FLD: 0 K/UL — SIGNIFICANT CHANGE UP (ref 0–0)
NRBC BLD AUTO-RTO: 0 /100 WBCS — SIGNIFICANT CHANGE UP (ref 0–0)
PLATELET # BLD AUTO: 273 K/UL — SIGNIFICANT CHANGE UP (ref 150–400)
PMV BLD: 11.1 FL — SIGNIFICANT CHANGE UP (ref 7–13)
POTASSIUM SERPL-MCNC: 4.2 MMOL/L — SIGNIFICANT CHANGE UP (ref 3.5–5.3)
POTASSIUM SERPL-SCNC: 4.2 MMOL/L — SIGNIFICANT CHANGE UP (ref 3.5–5.3)
PROT SERPL-MCNC: 6.3 G/DL — LOW (ref 6.6–8.7)
RBC # BLD: 3.41 M/UL — LOW (ref 3.8–5.2)
RBC # FLD: 12.6 % — SIGNIFICANT CHANGE UP (ref 10.3–14.5)
SODIUM SERPL-SCNC: 142 MMOL/L — SIGNIFICANT CHANGE UP (ref 135–145)
WBC # BLD: 8.09 K/UL — SIGNIFICANT CHANGE UP (ref 3.8–10.5)
WBC # FLD AUTO: 8.09 K/UL — SIGNIFICANT CHANGE UP (ref 3.8–10.5)

## 2025-03-20 PROCEDURE — 99233 SBSQ HOSP IP/OBS HIGH 50: CPT

## 2025-03-20 PROCEDURE — 99232 SBSQ HOSP IP/OBS MODERATE 35: CPT

## 2025-03-20 RX ORDER — ACETAMINOPHEN 500 MG/5ML
725 LIQUID (ML) ORAL ONCE
Refills: 0 | Status: COMPLETED | OUTPATIENT
Start: 2025-03-20 | End: 2025-03-20

## 2025-03-20 RX ORDER — FLUDROCORTISONE ACETATE 0.1 MG
0.1 TABLET ORAL DAILY
Refills: 0 | Status: DISCONTINUED | OUTPATIENT
Start: 2025-03-21 | End: 2025-03-24

## 2025-03-20 RX ORDER — FLUDROCORTISONE ACETATE 0.1 MG
0.05 TABLET ORAL ONCE
Refills: 0 | Status: COMPLETED | OUTPATIENT
Start: 2025-03-20 | End: 2025-03-20

## 2025-03-20 RX ADMIN — Medication 1 TABLET(S): at 11:37

## 2025-03-20 RX ADMIN — MIDODRINE HYDROCHLORIDE 15 MILLIGRAM(S): 5 TABLET ORAL at 05:57

## 2025-03-20 RX ADMIN — METHOCARBAMOL 500 MILLIGRAM(S): 500 TABLET, FILM COATED ORAL at 13:03

## 2025-03-20 RX ADMIN — Medication 290 MILLIGRAM(S): at 04:04

## 2025-03-20 RX ADMIN — OXYCODONE HYDROCHLORIDE 10 MILLIGRAM(S): 30 TABLET ORAL at 19:09

## 2025-03-20 RX ADMIN — LEVETIRACETAM 1000 MILLIGRAM(S): 10 INJECTION, SOLUTION INTRAVENOUS at 05:57

## 2025-03-20 RX ADMIN — OXYCODONE HYDROCHLORIDE 10 MILLIGRAM(S): 30 TABLET ORAL at 00:36

## 2025-03-20 RX ADMIN — SERTRALINE 50 MILLIGRAM(S): 100 TABLET, FILM COATED ORAL at 11:37

## 2025-03-20 RX ADMIN — METHOCARBAMOL 500 MILLIGRAM(S): 500 TABLET, FILM COATED ORAL at 05:58

## 2025-03-20 RX ADMIN — ENOXAPARIN SODIUM 40 MILLIGRAM(S): 100 INJECTION SUBCUTANEOUS at 22:06

## 2025-03-20 RX ADMIN — METHOCARBAMOL 500 MILLIGRAM(S): 500 TABLET, FILM COATED ORAL at 22:06

## 2025-03-20 RX ADMIN — ROSUVASTATIN CALCIUM 5 MILLIGRAM(S): 5 TABLET, FILM COATED ORAL at 22:06

## 2025-03-20 RX ADMIN — LEVETIRACETAM 1000 MILLIGRAM(S): 10 INJECTION, SOLUTION INTRAVENOUS at 17:07

## 2025-03-20 RX ADMIN — Medication 0.05 MILLIGRAM(S): at 15:11

## 2025-03-20 RX ADMIN — LIDOCAINE HYDROCHLORIDE 2 PATCH: 20 JELLY TOPICAL at 17:11

## 2025-03-20 RX ADMIN — MIDODRINE HYDROCHLORIDE 15 MILLIGRAM(S): 5 TABLET ORAL at 11:37

## 2025-03-20 RX ADMIN — MIDODRINE HYDROCHLORIDE 15 MILLIGRAM(S): 5 TABLET ORAL at 17:08

## 2025-03-20 RX ADMIN — OXYCODONE HYDROCHLORIDE 10 MILLIGRAM(S): 30 TABLET ORAL at 11:37

## 2025-03-20 RX ADMIN — Medication 0.05 MILLIGRAM(S): at 05:57

## 2025-03-20 RX ADMIN — Medication 5 MILLIGRAM(S): at 22:06

## 2025-03-20 NOTE — PROGRESS NOTE ADULT - SUBJECTIVE AND OBJECTIVE BOX
[facility] Division of Hospital Medicine    pt seen and examined at bedside  pt still feels dizzy and lightheaded when ambulating  pt currently on midodrine and fludrocortisone  having difficulty participating w pt  no cp, sob, abd pain, nausea, vomiting  tolerating po diet  normal stooling and urination    MEDICATIONS  (STANDING):  dextrose 5%. 1000 milliLiter(s) (50 mL/Hr) IV Continuous <Continuous>  dextrose 5%. 1000 milliLiter(s) (100 mL/Hr) IV Continuous <Continuous>  dextrose 50% Injectable 12.5 Gram(s) IV Push once  dextrose 50% Injectable 25 Gram(s) IV Push once  dextrose 50% Injectable 25 Gram(s) IV Push once  enoxaparin Injectable 40 milliGRAM(s) SubCutaneous every 24 hours  fludroCORTISONE 0.05 milliGRAM(s) Oral daily  glucagon  Injectable 1 milliGRAM(s) IntraMuscular once  insulin lispro (HumaLOG) Pump 1 Each SubCutaneous Continuous Pump  levETIRAcetam 1000 milliGRAM(s) Oral two times a day  lidocaine   4% Patch 2 Patch Transdermal every 24 hours  melatonin 5 milliGRAM(s) Oral at bedtime  methocarbamol 500 milliGRAM(s) Oral three times a day  midodrine. 15 milliGRAM(s) Oral three times a day  multivitamin 1 Tablet(s) Oral daily  polyethylene glycol 3350 17 Gram(s) Oral daily  rosuvastatin 5 milliGRAM(s) Oral at bedtime  sertraline 50 milliGRAM(s) Oral daily    MEDICATIONS  (PRN):  dextrose Oral Gel 15 Gram(s) Oral once PRN Blood Glucose LESS THAN 70 milliGRAM(s)/deciliter  oxyCODONE    IR 5 milliGRAM(s) Oral every 6 hours PRN Moderate Pain (4 - 6)  oxyCODONE    IR 10 milliGRAM(s) Oral every 6 hours PRN Severe Pain (7 - 10)        I&O's Summary    19 Mar 2025 07:01  -  20 Mar 2025 07:00  --------------------------------------------------------  IN: 1025 mL / OUT: 400 mL / NET: 625 mL        PHYSICAL EXAM:  Vital Signs Last 24 Hrs  T(C): 36.8 (20 Mar 2025 11:12), Max: 37.1 (20 Mar 2025 04:49)  T(F): 98.2 (20 Mar 2025 11:12), Max: 98.7 (20 Mar 2025 04:49)  HR: 101 (20 Mar 2025 11:35) (70 - 101)  BP: 95/67 (20 Mar 2025 11:35) (95/67 - 177/99)  BP(mean): --  RR: 18 (20 Mar 2025 11:12) (18 - 18)  SpO2: 97% (20 Mar 2025 11:12) (97% - 100%)    Parameters below as of 20 Mar 2025 11:12  Patient On (Oxygen Delivery Method): room air            CONSTITUTIONAL: NAD, well-groomed  ENMT: Moist oral mucosa, no pharyngeal injection or exudates; normal dentition  RESPIRATORY: Normal respiratory effort; lungs are clear to auscultation bilaterally  CARDIOVASCULAR: Regular rate and rhythm, normal S1 and S2, no murmur/rub/gallop; No lower extremity edema; Peripheral pulses are 2+ bilaterally  ABDOMEN: Nontender to palpation, normoactive bowel sounds, no rebound/guarding; No hepatosplenomegaly  MUSCLOSKELETAL:  Normal gait; no clubbing or cyanosis of digits; no joint swelling or tenderness to palpation  PSYCH: A+O to person, place, and time; affect appropriate  NEUROLOGY: CN 2-12 are intact and symmetric; no gross sensory deficits;   SKIN: No rashes; no palpable lesions    LABS:                        9.7    8.09  )-----------( 273      ( 20 Mar 2025 06:18 )             30.5     03-20    142  |  106  |  17.9  ----------------------------<  145[H]  4.2   |  26.0  |  0.51    Ca    8.7      20 Mar 2025 06:18    TPro  6.3[L]  /  Alb  3.5  /  TBili  <0.2[L]  /  DBili  x   /  AST  35[H]  /  ALT  60[H]  /  AlkPhos  53  03-20          Urinalysis Basic - ( 20 Mar 2025 06:18 )    Color: x / Appearance: x / SG: x / pH: x  Gluc: 145 mg/dL / Ketone: x  / Bili: x / Urobili: x   Blood: x / Protein: x / Nitrite: x   Leuk Esterase: x / RBC: x / WBC x   Sq Epi: x / Non Sq Epi: x / Bacteria: x        CAPILLARY BLOOD GLUCOSE      POCT Blood Glucose.: 148 mg/dL (20 Mar 2025 12:44)  POCT Blood Glucose.: 154 mg/dL (20 Mar 2025 07:49)  POCT Blood Glucose.: 130 mg/dL (19 Mar 2025 21:55)  POCT Blood Glucose.: 168 mg/dL (19 Mar 2025 17:13)

## 2025-03-20 NOTE — PROGRESS NOTE ADULT - ASSESSMENT
34F PMHx CHF, TIA, chronic back pain, DM type 1 on insulin pump presents to the ED for break through seizures that occurred at home. Admitted for syncope w/ r/o new onset seizures. Cardiology consulted for cardiogenic syncope.

## 2025-03-20 NOTE — PROGRESS NOTE ADULT - ASSESSMENT
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 Admitted for seizures. Of note, patient missed about 1-2 weeks of keppra due to meds not being renewed and only restarted on keppra recently. Consult for diabetes mgmt, a1c 7.    1. Moderately controlled T1DM  - FS 75 yesterday, pt reports did not eat much. Appetite improved today, BG at target  - May continue to use insulin pump, orders in place  - Please check fingersticks before meals and at bedtime while on insulin pump  - Has dexcom sensor in place  - If patient is off insulin pump for any reason, start lantus 4 units with low dose sliding scale with meals  - C-peptide 0.8 with glucose 108/JOSE and islet cell ab are negative    2. Seizures  - On Keppra   - Care per neuro/primary team    3. Syncope/autonomic dysfunction  - Symptomatic hypotension, on midodrine  - TTE showed reduced EF with diastolic dysfunction  - Needs acute rehab when medically optimized

## 2025-03-20 NOTE — CHART NOTE - NSCHARTNOTEFT_GEN_A_CORE
Source: Patient [x ]  Family [ ]   other [ ]    Current Diet: Diet, Regular:   Consistent Carbohydrate {Evening Snack} (CSTCHOSN)  Supplement Feeding Modality:  Oral  Glucerna Shake Cans or Servings Per Day:  1       Frequency:  Two Times a day (03-10-25 @ 09:25)      Patient reports [ ] nausea  [ ] vomiting [ ] diarrhea [ ] constipation  [ ]chewing problems [ ] swallowing issues  [ ] other:     PO intake:  < 50% [ x]   50-75%  [ ]   %  [ ]  other :    Source for PO intake [x ] Patient [ ] family [ ] chart [ ] staff [ ] other    Current Weight:   3/20 50kg  3/17 51.2 kg  % Weight Change     Pertinent Medications: MEDICATIONS  (STANDING):  dextrose 5%. 1000 milliLiter(s) (50 mL/Hr) IV Continuous <Continuous>    enoxaparin Injectable 40 milliGRAM(s) SubCutaneous every 24 hours  fludroCORTISONE 0.05 milliGRAM(s) Oral daily  glucagon  Injectable 1 milliGRAM(s) IntraMuscular once  insulin lispro (HumaLOG) Pump 1 Each SubCutaneous Continuous Pump  levETIRAcetam 1000 milliGRAM(s) Oral two times a day  lidocaine   4% Patch 2 Patch Transdermal every 24 hours  melatonin 5 milliGRAM(s) Oral at bedtime  methocarbamol 500 milliGRAM(s) Oral three times a day  midodrine. 15 milliGRAM(s) Oral three times a day  multivitamin 1 Tablet(s) Oral daily  polyethylene glycol 3350 17 Gram(s) Oral daily  rosuvastatin 5 milliGRAM(s) Oral at bedtime  sertraline 50 milliGRAM(s) Oral daily    MEDICATIONS  (PRN):  dextrose Oral Gel 15 Gram(s) Oral once PRN Blood Glucose LESS THAN 70 milliGRAM(s)/deciliter  oxyCODONE    IR 5 milliGRAM(s) Oral every 6 hours PRN Moderate Pain (4 - 6)  oxyCODONE    IR 10 milliGRAM(s) Oral every 6 hours PRN Severe Pain (7 - 10)    Pertinent Labs: CBC Full  -  ( 20 Mar 2025 06:18 )  WBC Count : 8.09 K/uL  RBC Count : 3.41 M/uL  Hemoglobin : 9.7 g/dL  Hematocrit : 30.5 %  Platelet Count - Automated : 273 K/uL  Mean Cell Volume : 89.4 fl  Mean Cell Hemoglobin : 28.4 pg  Mean Cell Hemoglobin Concentration : 31.8 g/dL      Skin:     Nutrition focused physical exam conducted - found signs of malnutrition [ ]absent [x ]present    Subcutaneous fat loss: [x ] Orbital fat pads region, [ ]Buccal fat region, [ ]Triceps region,  [ ]Ribs region    Muscle wasting: [ x]Temples region, [ x]Clavicle region, [ ]Shoulder region, [ ]Scapula region, [ ]Interosseous region,  [ ]thigh region, [ ]Calf region    Estimated Needs:   [ ] no change since previous assessment  [ ] recalculated:     Current Nutrition Diagnosis:  Pt remains at high nutrition risk secondary to malnutrition (Severe chronic) related to inadequate protein energy intake in setting of severe pain, seizures, CHF as evidenced by meeting < 75% est needs >1 mo, 40lb(27%) weight loss < 2 months. Pt reports appetite is slowly improving, dislikes Glucerna drinks agreeable to try ensure max. Frustrated with con cho restriction.  Preferences obtained, education provided. Weight trending down noted.     Recommendations:   1) Continue Diet  2) Daily weight  3) Cont MVI  4) Change glucerna to ensure max BID    Monitoring and Evaluation:   [ ] PO intake [ ] Tolerance to diet prescription [X] Weights  [X] Follow up per protocol [X] Labs:

## 2025-03-20 NOTE — PROGRESS NOTE ADULT - ASSESSMENT
34 year old female who is being managed as an inpatient for breakthrough seizure and syncope. She has a past medical history of type I DM on insulin pump, TIA, seizure disorder. Prior to hospitalization, she was recently diagnosed with new onset CHF without a clear etiology, and she has been in and out of the hospital since November. She has not fully recovered and has been having increased difficulty walking. Patient is being managed as an inpatient for breakthrough seizure requiring vEEG.     Syncope  Orthostatic hypotension likely secondary to autonomic dysfunction  Heart failure with reduced ejection fracture  - CT head negative on 3/8 and 3/10  - TTE showing LVEF is mildly decreased at 46%, global left ventricular hypokinesis  - Carotid doppler was unremarkable; Lyme serology negative  - Alert and oriented x3, answers questions and engages in conversation  - Likely due to uncontrolled DM which caused autonomic dysfunction  - Discontinued metoprolol  - Continue fall precautions  - Continue compression stockings and abdominal binder (needs appropriate size)  - wmidodrine dose to 15mg tid  -will inc dose of fludrocortisone to 0.1mg daily  - Cardiology and Neurology following / cardiology called for re-evaluation    Breakthrough seizure  Leukocytosis likely related to seizure, resolved  - s/p Keppra 1000mg IV once  - White count normalized, afebrile  - CT head negative on 3/8 and 3/10  - vEEG showing no new seizures  - Continue seizure precautions  - Continue Keppra 1000mg PO BID  - Neurology following, recs noted    Type I DM  - A1c level 7  - Patient has insulin pump  - C-peptide 0.8 with glucose 108/JOSE and islet cell ab pending  - Endocrinology following and managing    Urinary tract infection  - s/p Rocephin for total 3 days    MDD  - Continue Zoloft    HLD  - Continue Crestor      DVT/GI ppx: Lovenox  Diet: Carb consistent  Code Status: Full code  Dispo: may not be a candidate for AR at this time

## 2025-03-20 NOTE — ED ADULT NURSE NOTE - HIV OFFER
NEPHROLOGY INPATIENT CONSULTATION REPORT    DATE OF VISIT:   3/20/2025     PATIENT NAME: SILVESTRE REVELES  MEDICAL RECORD NUMBER: [id number]  YOB: 1948    CONSULTATION REQUESTED BY:  Wali Haywood DO      PRIMARY CARE PROVIDER:   Yamel Grubbs    PROBLEM LIST:   Chronic kidney disease end-stage-on chronic cyclic peritoneal dialysis--PD catheter removed 3/19/25--vancomycin 1 g--Cefepime 1 gm    Vitamin D deficiency-on oral supplementation  Secondary hyperparathyroidism-on vitamin D analog therapy  Anemia of chronic kidney disease-on Mircera  Functional iron deficiency-on intravenous Venofer  Chronic nutritional deficiency - sAlb 2.4 gm/dl  Systemic hypertension  Diabetes mellitus, type II--A1C 5.1 January 2025  Hyperlipidemia  Cardiac valvular disease-mild mitral stenosis-mean gradient 9 mmHg, moderate aortic stenosis-mean gradient 19 mmHg, moderate aortic insufficiency--LVEF 55%  Gastroesophageal reflux disease-on proton pump inhibitor therapy  Colonic diverticulosis - present colonic bleed  Colonic tubular adenomatous polyps  History of spontaneous bacterial peritonitis  History of hysterectomy-tubal ligation  Restless leg syndrome-ropinirole   Smoker     PHARMACOLOGIC REGIMEN:  Inpatient Enteral-Parenteral Regimen  Current Facility-Administered Medications   Medication Dose Route Frequency Provider Last Rate Last Admin    insulin regular human (HumuLIN R) (diluted) 1 unit/mL injection 5 Units  5 Units Intravenous Once Wali Haywood DO        heparin (porcine) injection 5,000 Units  5,000 Units Subcutaneous 3 times per day Shar Camacho,    5,000 Units at 03/20/25 0659    VANCOMYCIN - PHARMACIST MONITORED Misc   Does not apply See Admin Instructions Shar Camacho DO        piperacillin-tazobactam (ZOSYN) 3.375 g in sodium chloride 0.9 % 100 mL IVPB  3.375 g Intravenous 2 times per day Shar Camacho DO   Completed at 03/20/25 0400    clindamycin (CLEOCIN) in dextrose 5% premix IVPB  600 mg  600 mg Intravenous 3 times per day Shar Camacho,  mL/hr at 03/20/25 0653 600 mg at 03/20/25 0653    B complex-vitamin C-folic acid (NEPHRO-RENO) tablet 0.8 mg  1 tablet Oral Nightly Shar Camacho, DO   0.8 mg at 03/19/25 2156    [START ON 3/21/2025] bumetanide (BUMEX) tablet 2 mg  2 mg Oral Once per day on Sunday Monday Wednesday Friday Shar Camacho, DO        metoPROLOL succinate (TOPROL-XL) ER tablet 25 mg  25 mg Oral Nightly Shar Camacho DO   25 mg at 03/19/25 2156    pantoprazole (PROTONIX) EC tablet 40 mg  40 mg Oral Daily Shar Camacho DO        rOPINIRole (REQUIP) tablet 4 mg  4 mg Oral Nightly Shar Camacho DO   4 mg at 03/19/25 2156        Inpatient PRN Regimen  Current Facility-Administered Medications   Medication Dose Route Frequency    ondansetron (ZOFRAN) injection 4 mg  4 mg Intravenous BID PRN    prochlorperazine (COMPAZINE) tablet 5 mg  5 mg Oral Q4H PRN    prochlorperazine (COMPAZINE) injection 5 mg  5 mg Intravenous Q4H PRN    acetaminophen (TYLENOL) tablet 650 mg  650 mg Oral Q4H PRN    polyethylene glycol (MIRALAX) packet 17 g  17 g Oral Daily PRN    docusate sodium-sennosides (SENOKOT S) 50-8.6 MG 2 tablet  2 tablet Oral Daily PRN    bisacodyl (DULCOLAX) suppository 10 mg  10 mg Rectal Daily PRN    HYDROcodone-acetaminophen (NORCO) 5-325 MG per tablet 1 tablet  1 tablet Oral Q4H PRN    midodrine (PROAMATINE) tablet 5 mg  5 mg Oral TID PRN        Inpatient Infusion Regimen  Current Facility-Administered Medications   Medication Dose Route Frequency Provider Last Rate Last Admin        PHARMACOLOGIC REGIMEN (Outpatient):  B-complex-C-folic acid daily   Bumetanide 2 mg 4 days per week   Cholecalciferol daily   Calcitriol 0.25 mcg daily   Metoprolol 25 mg daily   Midodrine 5 mg is systolic less then 90  Ondansetron 4 mg every 12 hours as needed for nausea   Pantoprazole 40 mg daily   Ropinirole 4 mg nightly   Sevelamer carbonate 800 mg TID with meals and  snack    ALLERGIES:   Allergies as of 03/19/2025    (No Known Allergies)       DIETARY REGIMEN:  Renal diet 2400 mg Na, 60 mEq K+, 1000 mg PO4     DIALYTIC REGIMEN:  Dialyzer 180 H   Time 3.5 hrs    Temp 36 C    EDW 47 kg  K 3.0   Ca 2.5    Na 138    HCO3 38   Qb 350    Qd 600  Heparin LD ---    Heparin hourly ---    Discontinue --- minutes prior to end of tx  Mircera 200 mcg qowk or EPO 20,000 u qtx    Venofer 100 mg qwk  Calcitriol 0.25 mcg qday     SOCIAL HISTORY:  Social History     Tobacco Use    Smoking status: Every Day     Current packs/day: 0.50     Average packs/day: 0.5 packs/day for 47.2 years (23.6 ttl pk-yrs)     Types: Cigarettes     Start date: 1978    Smokeless tobacco: Never   Vaping Use    Vaping status: never used   Substance Use Topics    Alcohol use: Not Currently    Drug use: Never       FAMILY HISTORY:  Family History   Problem Relation Age of Onset    Diabetes Brother         LABORATORIES:    Electrolytes/RenalFunction   Recent Labs   Lab 03/20/25  0540 03/19/25  1241 11/05/24  0601 11/04/24  0615 11/03/24  0619 11/02/24  0839 11/01/24  0502 10/31/24  1322 10/29/24  0357 10/28/24  0416 10/27/24  0411 10/15/24  1221 10/04/24  1323   Sodium 138 138 137 136 140 139 137 137 134* 137 137   < > 142   Potassium 5.6* 4.8 4.1 5.0 4.6 4.5 4.2 3.7 4.0 4.2 5.1   < > 4.0   Chloride 100 98 101 98 100 100 100 98 96* 98 98   < > 102   Carbon Dioxide 24 29 26 25 28 30 24 25 29 29 25   < > 28   BUN 74* 72* 26* 49* 36* 26* 65* 56* 35* 50* 75*   < > 51*   Creatinine 8.68* 8.07* 4.23* 7.86* 6.36* 4.50* 9.57* 8.27* 5.58* 6.09* 7.05*   < > 7.58*   Glucose 100* 146* 95 90 90 110* 106* 125* 147* 115* 139*   < > 126*   Calcium 8.9 9.1 8.7 9.2 9.1 8.7 8.9 9.0 8.6 8.7 9.2   < > 8.4   Phosphorus  --   --   --   --   --   --   --   --  5.9* 6.3* 7.8*  --  4.8*   Magnesium  --  2.2  --   --   --   --   --  1.8 1.3* 1.6*  --   --  2.0    < > = values in this interval not displayed.       Metabolic Bone Parameters   No  results for input(s): \"VITD25\", \"INTAC\" in the last 8765 hours.    NutritionalParameters   Recent Labs   Lab 03/19/25  1241 11/05/24  0601 11/04/24  0615 10/28/24  0416 10/27/24  0411   Albumin 2.4* 1.8* 1.9*   < > 2.1*   Prealbumin  --   --   --   --  32.4    < > = values in this interval not displayed.       Hepatobiliary/PancreaticEnzymes   Recent Labs   Lab 03/19/25  1241 11/05/24  0601 11/04/24  0615   GOT/AST 44* 21 16   GPT 21 <7 <7   Alkaline Phosphatase 69 49 50   Bilirubin, Total 0.4 0.2 0.2   Lipase 24  --   --        Cardiac Parameters   No results for input(s): \"CPK\", \"RAPDTR\", \"BNP\", \"DDIMER\" in the last 8765 hours.    Hematopoetic Parameters   Recent Labs   Lab 03/20/25  0540 03/19/25  1241 11/05/24  0601 11/02/24  0839 11/01/24  0502 10/27/24  1208 10/27/24  0411   WBC 9.8 9.1 9.9   < > 11.0   < > 9.4   HGB 7.3* 9.4* 9.4*   < > 9.7*   < > 9.5*   HCT 22.5* 29.8* 29.4*   < > 30.5*   < > 30.5*    429 352   < > 339   < > 370   Iron, Percent Saturation  --   --   --   --  24  --  13*    < > = values in this interval not displayed.       Vitamin-Mineral Parameters  Recent Labs   Lab 11/01/24  0502 10/27/24  0411   Iron 42* 29*   Iron Binding Capacity 177* 221*   Ferritin 2,143* 863*       Coag Parameters  Recent Labs   Lab 10/26/24  1546   PTT 26   Protime- PT 12.3*   INR 1.2       Endocrine Parameters   Recent Labs   Lab 10/26/24  1546 10/15/24  1221   TSH  --  1.952   Hemoglobin A1C 4.8  --        Urinalysis   Recent Labs   Lab 01/27/25  1115   USPG 1.011   UPH 8.0*   UKET Negative   UPROT 30*   UGLU 300*   UBILI Negative   UROB 0.2   UWBC Negative   UNITR Negative   URBC Negative       Microbiology Results  (Last 10 results in the past 7 days)      Specimen   Gram Smear   Culture Result   Status       03/19/25  1600         No polymorphonuclear cells seen.  [P]            No epithelial cells seen.  [P]            No organisms seen.  [P]           03/19/25  1557         No polymorphonuclear cells  seen.  [P]            No epithelial cells seen.  [P]            No organisms seen.  [P]                   [P] - Preliminary Result               3/19/2025  2:57 PM   Culture, Blood or Bone Marrow No Growth <24 hours (P)   Culture, Blood or Bone Marrow No Growth <24 hours (P)        RADIOGRAPHIC IMAGING:  3/19/25-CT abdomen/pelvis- Large amounts of subcutaneous air and soft tissue edema along the mid and lower abdomen wall    OTHER STUDIES:  3/19/25-PD dialysis catheter removal--Extensive subcutaneous edema and gas without evidence of necrosis or ischemic tissue- Moderate amount of clot just anterior to the fascia near the catheter site    URINALYSIS:      PROVIDER CONTACTS:  Mandeep Archer MD -General Surgery  Wali Haywood DO-Hospitalist     NEPHROLOGY HISTORY:   PD dialysis under direction of Asmita Walsh MD  Target weight 47 kg  Kt/V total 2.07  Labs: 3/6/25: Hgb 9.9, ferritin 623, iron saturation 18%, albumin 3.5, potassium 4.2, PO4 6.2, mag 2.0, calcium 9.0, vitamin D 25 hydroxy 30.2, intact parathyroid hormone 320  Biochemical parameters-will transition to perm cath today and complete HD   Component      Latest Ref Rng 3/20/2025  5:40 AM   Sodium      135 - 145 mmol/L 138    Potassium      3.4 - 5.1 mmol/L 5.6 (H)    Chloride      97 - 110 mmol/L 100    CO2      21 - 32 mmol/L 24    ANION GAP      7 - 19 mmol/L 20 (H)    CALCIUM      8.4 - 10.2 mg/dL 8.9    Glucose      70 - 99 mg/dL 100 (H)    BUN      6 - 20 mg/dL 74 (H)    Creatinine      0.51 - 0.95 mg/dL 8.68 (H)    Glomerular Filtration Rate      >=60  4 (L)       Issues draining with PD exchanges and developed tender abdominal fluid bulge, flushed at dialysis center with bloody drainage--PD catheter removed without evidence for necrotizing tissue-received Zosyn/ clindamycin/vancomycin--culture negative to date. Continues to be afebrile  ID changed antibiotic coverage to vancomycin--Cefepime 1 gm IV     MEDICAL-SURGICAL HISTORY:  BP  [phone]  Hgb February at dialysis 9.8   HGB   Latest Ref Rng 12.0 - 15.5 g/dL   3/19/2025  12:41 PM 9.4 (L)    3/20/2025  5:40 AM 7.3 (L)           REVIEW OF SYSTEMS:  CONSTITUTIONAL:   EYES:   EARS:   NOSE/SINUSES:   THROAT:   CARDIAC:   LUNGS:   GASTROINTESTINAL:   GENITOURINARY:   MUSCULOSKELETAL:   NERVOUS SYSTEM:   VASCULAR:   HEMATOLOGIC/LYMPHATICS:   ENDOCRINE:   CUTANEOUS:   PSYCHIATRIC:     PHYSICAL EXAMINATION:   VITAL SIGNS: Blood pressure 108/61, pulse 92, temperature 98.2 °F (36.8 °C), temperature source Oral, resp. rate 16, height 4' 10.5\" (1.486 m), weight 48.3 kg (106 lb 7.7 oz), SpO2 94%. Body mass index is 21.88 kg/m².  EYES: sclera anicteric, moist conjunctiva   EARS: no otic drainage  NASOPHARYNX: no nasal drainage  OROPHARYNX: moist mucus membranes  NECK: no adenopathy or thyromegaly   CHEST WALL: normal subcutaneous tissue   LUNGS: clear throughout ascultation, no adventitious sounds heard   CARDIAC: + murmur, regular rate and rhythm   ABDOMEN: bowel sounds present in all quadrants. Non tender to palpation, abdominal bandage without drainage   BACK: No costovertebral angle tenderness or presacral edema   VASCULATURE: No jugular venous distention supine  EXTREMITIES: trace lower extremity edema, restless legs    MUSCULOSKELETAL: non tender musculature  NEUROLOGIC: clear sensorium   INTEGUMENT: normal skin turgor response       IMPRESSION:      1. Chronic kidney disease end-stage-on chronic cyclic peritoneal dialysis--PD catheter removed 3/19/25--perm cath today then HD--antibiotic coverage vancomycin 1 g--Cefepime 1 g      2. Vitamin D deficiency-on oral supplementation      3. Secondary hyperparathyroidism-on vitamin D analog therapy      4. Anemia of chronic kidney disease-epogen 20,000 units with HD       5. Functional iron deficiency-venofer 100 mg with HD     MEDICATIONS CHANGES:   Calcitriol 0.25 mcg daily   Cholecalciferol 5000 units daily   Sevelamer carbonate 800 mg TID with  meals    LABORATORY ORDERS:  Tomorrow: renal panel, CBC with differential    ADDITIONAL ORDERS:  Perm cath today  HD today  One unit PRBC today     REFERRALS/CONSULTATIONS:  None    FOLLOWUP VISIT:  Daily during hospitalization       Opt out

## 2025-03-20 NOTE — PROGRESS NOTE ADULT - PROBLEM SELECTOR PLAN 1
Seen at bedside, not wearing DONAL's/abd BINDER - states they are too big, needs an xtra small.  Ct Midodrine 15mg po TID  Ct fludrocortisone 0.05 mg Oral daily Patient with dizziness.   Will need to be compliant with Stocking/TEDS up over abdomen before getting OOB in the morning  Patient is also taking regularly oxycodone IR which can contribute to hypotension - may need pain management referral to taper pain medications.

## 2025-03-20 NOTE — PROGRESS NOTE ADULT - NS ATTEND AMEND GEN_ALL_CORE FT
Patient reconsulted for orthostasis patient is orthostatic but non complaint with the compression stockings and abdominal binders  Ct Midodrine 15mg po TID  Ct fludrocortisone 0.05 mg Oral daily Patient with dizziness.   Will need to be compliant with Stocking/TEDS up over abdomen before getting OOB in the morning  Patient is also taking regularly oxycodone IR which can contribute to hypotension - may need pain management referral to taper pain medications.    There is no further contribution from a cardiovascular standpoint, encourage hydration and possibly fluid restrictions but limit to 1.5L     Adenike Grier D.O. [facility]  Cardiology/Vascular Cardiology -[facility]   Telephone # [phone]

## 2025-03-20 NOTE — PROGRESS NOTE ADULT - SUBJECTIVE AND OBJECTIVE BOX
INTERVAL HPI/OVERNIGHT EVENTS:  follow up diabetes management  on pump  FS 75 yesterday- pt reports did not have the best appetite    ROS: No acute complaints, reports appetite improving today    MEDICATIONS  (STANDING):  dextrose 5%. 1000 milliLiter(s) (50 mL/Hr) IV Continuous <Continuous>  dextrose 5%. 1000 milliLiter(s) (100 mL/Hr) IV Continuous <Continuous>  dextrose 50% Injectable 12.5 Gram(s) IV Push once  dextrose 50% Injectable 25 Gram(s) IV Push once  dextrose 50% Injectable 25 Gram(s) IV Push once  enoxaparin Injectable 40 milliGRAM(s) SubCutaneous every 24 hours  fludroCORTISONE 0.05 milliGRAM(s) Oral once  fludroCORTISONE 0.05 milliGRAM(s) Oral daily  glucagon  Injectable 1 milliGRAM(s) IntraMuscular once  insulin lispro (HumaLOG) Pump 1 Each SubCutaneous Continuous Pump  levETIRAcetam 1000 milliGRAM(s) Oral two times a day  lidocaine   4% Patch 2 Patch Transdermal every 24 hours  melatonin 5 milliGRAM(s) Oral at bedtime  methocarbamol 500 milliGRAM(s) Oral three times a day  midodrine. 15 milliGRAM(s) Oral three times a day  multivitamin 1 Tablet(s) Oral daily  polyethylene glycol 3350 17 Gram(s) Oral daily  rosuvastatin 5 milliGRAM(s) Oral at bedtime  sertraline 50 milliGRAM(s) Oral daily    MEDICATIONS  (PRN):  dextrose Oral Gel 15 Gram(s) Oral once PRN Blood Glucose LESS THAN 70 milliGRAM(s)/deciliter  oxyCODONE    IR 5 milliGRAM(s) Oral every 6 hours PRN Moderate Pain (4 - 6)  oxyCODONE    IR 10 milliGRAM(s) Oral every 6 hours PRN Severe Pain (7 - 10)      Allergies  No Known Allergies      Vital Signs Last 24 Hrs  T(C): 36.8 (20 Mar 2025 11:12), Max: 37.1 (20 Mar 2025 04:49)  T(F): 98.2 (20 Mar 2025 11:12), Max: 98.7 (20 Mar 2025 04:49)  HR: 101 (20 Mar 2025 11:35) (70 - 101)  BP: 95/67 (20 Mar 2025 11:35) (95/67 - 177/99)  BP(mean): --  RR: 18 (20 Mar 2025 11:12) (18 - 18)  SpO2: 97% (20 Mar 2025 11:12) (97% - 100%)    Parameters below as of 20 Mar 2025 11:12  Patient On (Oxygen Delivery Method): room air        PHYSICAL EXAM:  GENERAL: NAD, comfortable  NECK: Supple; trachea midline  CHEST/LUNG: Clear to auscultation bilaterally; No wheezes  HEART: Regular rate and rhythm  ABDOMEN: Soft, Nontender, Nondistended  NEURO: AAOx3  EXTREMITIES:  no edema        LABS:                        9.7    8.09  )-----------( 273      ( 20 Mar 2025 06:18 )             30.5     03-20    142  |  106  |  17.9  ----------------------------<  145[H]  4.2   |  26.0  |  0.51    Ca    8.7      20 Mar 2025 06:18    TPro  6.3[L]  /  Alb  3.5  /  TBili  <0.2[L]  /  DBili  x   /  AST  35[H]  /  ALT  60[H]  /  AlkPhos  53  03-20    Urinalysis Basic - ( 20 Mar 2025 06:18 )    Color: x / Appearance: x / SG: x / pH: x  Gluc: 145 mg/dL / Ketone: x  / Bili: x / Urobili: x   Blood: x / Protein: x / Nitrite: x   Leuk Esterase: x / RBC: x / WBC x   Sq Epi: x / Non Sq Epi: x / Bacteria: x          POCT Blood Glucose.: 148 mg/dL (03-20-25 @ 12:44)  POCT Blood Glucose.: 154 mg/dL (03-20-25 @ 07:49)  POCT Blood Glucose.: 130 mg/dL (03-19-25 @ 21:55)  POCT Blood Glucose.: 168 mg/dL (03-19-25 @ 17:13)        Thyroid Stimulating Hormone, Serum: 1.49 uIU/mL (03-19-25 @ 17:05)  Thyroid Stimulating Hormone, Serum: 0.87 uIU/mL (03-19-25 @ 09:11)  Free Thyroxine, Serum: 1.1 ng/dL (03-19-25 @ 09:11)  Triiodothyronine, Total (T3 Total): 83 ng/dL (03-08-25 @ 15:55)  Free Thyroxine, Serum: 1.2 ng/dL (03-08-25 @ 15:55)  Thyroid Stimulating Hormone, Serum: 1.11 uIU/mL (03-08-25 @ 15:55)   INTERVAL HPI/OVERNIGHT EVENTS:  follow up diabetes management  on pump  FS 75 yesterday- pt reports did not have the best appetite    ROS: No acute complaints, reports appetite improving today    MEDICATIONS  (STANDING):  dextrose 5%. 1000 milliLiter(s) (50 mL/Hr) IV Continuous <Continuous>  dextrose 5%. 1000 milliLiter(s) (100 mL/Hr) IV Continuous <Continuous>  dextrose 50% Injectable 12.5 Gram(s) IV Push once  dextrose 50% Injectable 25 Gram(s) IV Push once  dextrose 50% Injectable 25 Gram(s) IV Push once  enoxaparin Injectable 40 milliGRAM(s) SubCutaneous every 24 hours  fludroCORTISONE 0.05 milliGRAM(s) Oral once  fludroCORTISONE 0.05 milliGRAM(s) Oral daily  glucagon  Injectable 1 milliGRAM(s) IntraMuscular once  insulin lispro (HumaLOG) Pump 1 Each SubCutaneous Continuous Pump  levETIRAcetam 1000 milliGRAM(s) Oral two times a day  lidocaine   4% Patch 2 Patch Transdermal every 24 hours  melatonin 5 milliGRAM(s) Oral at bedtime  methocarbamol 500 milliGRAM(s) Oral three times a day  midodrine. 15 milliGRAM(s) Oral three times a day  multivitamin 1 Tablet(s) Oral daily  polyethylene glycol 3350 17 Gram(s) Oral daily  rosuvastatin 5 milliGRAM(s) Oral at bedtime  sertraline 50 milliGRAM(s) Oral daily    MEDICATIONS  (PRN):  dextrose Oral Gel 15 Gram(s) Oral once PRN Blood Glucose LESS THAN 70 milliGRAM(s)/deciliter  oxyCODONE    IR 5 milliGRAM(s) Oral every 6 hours PRN Moderate Pain (4 - 6)  oxyCODONE    IR 10 milliGRAM(s) Oral every 6 hours PRN Severe Pain (7 - 10)      Allergies  No Known Allergies      Vital Signs Last 24 Hrs  T(C): 36.8 (20 Mar 2025 11:12), Max: 37.1 (20 Mar 2025 04:49)  T(F): 98.2 (20 Mar 2025 11:12), Max: 98.7 (20 Mar 2025 04:49)  HR: 101 (20 Mar 2025 11:35) (70 - 101)  BP: 95/67 (20 Mar 2025 11:35) (95/67 - 177/99)  BP(mean): --  RR: 18 (20 Mar 2025 11:12) (18 - 18)  SpO2: 97% (20 Mar 2025 11:12) (97% - 100%)    Parameters below as of 20 Mar 2025 11:12  Patient On (Oxygen Delivery Method): room air        PHYSICAL EXAM:  GENERAL: NAD, comfortable  NECK: Supple; trachea midline  CHEST/LUNG: Clear to auscultation bilaterally; No wheezes  HEART: Regular rate and rhythm  ABDOMEN: Soft, Nontender, Nondistended  NEURO: AAOx3  EXTREMITIES:  no edema        LABS:                        9.7    8.09  )-----------( 273      ( 20 Mar 2025 06:18 )             30.5     03-20    142  |  106  |  17.9  ----------------------------<  145[H]  4.2   |  26.0  |  0.51    Ca    8.7      20 Mar 2025 06:18    TPro  6.3[L]  /  Alb  3.5  /  TBili  <0.2[L]  /  DBili  x   /  AST  35[H]  /  ALT  60[H]  /  AlkPhos  53  03-20    POCT Blood Glucose.: 148 mg/dL (03-20-25 @ 12:44)  POCT Blood Glucose.: 154 mg/dL (03-20-25 @ 07:49)  POCT Blood Glucose.: 130 mg/dL (03-19-25 @ 21:55)  POCT Blood Glucose.: 168 mg/dL (03-19-25 @ 17:13)        Thyroid Stimulating Hormone, Serum: 1.49 uIU/mL (03-19-25 @ 17:05)  Thyroid Stimulating Hormone, Serum: 0.87 uIU/mL (03-19-25 @ 09:11)  Free Thyroxine, Serum: 1.1 ng/dL (03-19-25 @ 09:11)  Triiodothyronine, Total (T3 Total): 83 ng/dL (03-08-25 @ 15:55)  Free Thyroxine, Serum: 1.2 ng/dL (03-08-25 @ 15:55)  Thyroid Stimulating Hormone, Serum: 1.11 uIU/mL (03-08-25 @ 15:55)

## 2025-03-20 NOTE — PROGRESS NOTE ADULT - PROBLEM SELECTOR PLAN 2
Will need CMR, preferable before discharge - can get out patient with [facility] if wanting to discharge  Euvolemic on exam  Unable to tolerate GDMT due to hypotension  Monitor on telemetry.  Strict i/o and daily weights.  Keep K > 4, Mg > 2.  Monitor renal function with ongoing diuresis.

## 2025-03-20 NOTE — PROGRESS NOTE ADULT - SUBJECTIVE AND OBJECTIVE BOX
[facility]                                                         CARDIOLOGY AT [facility]                                                                  [address] [facility] [place] [place] [address]                                                         Telephone: [phone]. Fax:[phone]                                                                             PROGRESS NOTE    Reason for follow up:   Hypotension when standing/CHF  Update:   Reconsulted today due to continued hypotension while standing.      Review of symptoms:   Cardiac:  No chest pain. No dyspnea. No palpitations.  Respiratory: no cough. No dyspnea  Gastrointestinal: No diarrhea. No abdominal pain. No bleeding.   Neuro: No focal neuro complaints.    Vitals:  T(C): 36.8 (03-20-25 @ 11:12), Max: 37.1 (03-20-25 @ 04:49)  HR: 101 (03-20-25 @ 11:35) (70 - 101)  BP: 95/67 (03-20-25 @ 11:35) (95/67 - 177/99)  RR: 18 (03-20-25 @ 11:12) (18 - 18)  SpO2: 97% (03-20-25 @ 11:12) (97% - 100%)    I&O's Summary  19 Mar 2025 07:01  -  20 Mar 2025 07:00  --------------------------------------------------------  IN: 1025 mL / OUT: 400 mL / NET: 625 mL    PHYSICAL EXAM:  Appearance: Comfortable. Thin pleasant female in NAD  HEENT:  Atraumatic. Normocephalic.  Normal oral mucosa  Neurologic: A & O x 3, no gross focal deficits.  Cardiovascular: RRR S1 S2, No murmur, no rubs/gallops. No JVD  Respiratory: Lungs clear to auscultation, unlabored   Gastrointestinal:  Soft, Non-tender, + BS  Lower Extremities: + Peripheral Pulses, No clubbing, cyanosis, or edema  Psychiatry: Patient is calm. No agitation.   Skin: warm and dry.    CURRENT CARDIAC MEDICATIONS:  midodrine. 15 milliGRAM(s) Oral three times a day    CURRENT OTHER MEDICATIONS:  levETIRAcetam 1000 milliGRAM(s) Oral two times a day  melatonin 5 milliGRAM(s) Oral at bedtime  methocarbamol 500 milliGRAM(s) Oral three times a day  oxyCODONE    IR 5 milliGRAM(s) Oral every 6 hours PRN Moderate Pain (4 - 6)  oxyCODONE    IR 10 milliGRAM(s) Oral every 6 hours PRN Severe Pain (7 - 10)  sertraline 50 milliGRAM(s) Oral daily  polyethylene glycol 3350 17 Gram(s) Oral daily  dextrose 50% Injectable 12.5 Gram(s) IV Push once, Stop order after: 1 Doses  dextrose 50% Injectable 25 Gram(s) IV Push once, Stop order after: 1 Doses  dextrose 50% Injectable 25 Gram(s) IV Push once, Stop order after: 1 Doses  dextrose Oral Gel 15 Gram(s) Oral once, Stop order after: 1 Doses PRN Blood Glucose LESS THAN 70 milliGRAM(s)/deciliter  fludroCORTISONE 0.05 milliGRAM(s) Oral once, Stop order after: 1 Doses  fludroCORTISONE 0.05 milliGRAM(s) Oral daily  glucagon  Injectable 1 milliGRAM(s) IntraMuscular once, Stop order after: 1 Doses  insulin lispro (HumaLOG) Pump 1 Each SubCutaneous Continuous Pump  rosuvastatin 5 milliGRAM(s) Oral at bedtime  dextrose 5%. 1000 milliLiter(s) (50 mL/Hr) IV Continuous <Continuous>  dextrose 5%. 1000 milliLiter(s) (100 mL/Hr) IV Continuous <Continuous>  enoxaparin Injectable 40 milliGRAM(s) SubCutaneous every 24 hours  lidocaine   4% Patch 2 Patch Transdermal every 24 hours  multivitamin 1 Tablet(s) Oral daily      LABS:	 	                       9.7    8.09  )-----------( 273      ( 20 Mar 2025 06:18 )             30.5     03-20    142  |  106  |  17.9  ----------------------------<  145[H]  4.2   |  26.0  |  0.51    Ca    8.7      20 Mar 2025 06:18    TPro  6.3[L]  /  Alb  3.5  /  TBili  <0.2[L]  /  DBili  x   /  AST  35[H]  /  ALT  60[H]  /  AlkPhos  53  03-20      TSH: Thyroid Stimulating Hormone, Serum: 1.49 uIU/mL  Thyroid Stimulating Hormone, Serum: 0.87 uIU/mL      TELEMETRY:   Sr 90's, no acute alarms noted.                                                                      [facility]                                                         CARDIOLOGY AT [facility]                                                                  [address] [facility] [place] [place] [address]                                                         Telephone: [phone]. Fax:[phone]                                                                             PROGRESS NOTE    Reason for follow up:   Hypotension when standing/CHF  Update:   Reconsulted today due to continued hypotension while standing.      Review of symptoms:   Cardiac:  No chest pain. No dyspnea. No palpitations.  Respiratory: no cough. No dyspnea  Gastrointestinal: No diarrhea. No abdominal pain. No bleeding.   Neuro: No focal neuro complaints.    Vitals:  T(C): 36.8 (03-20-25 @ 11:12), Max: 37.1 (03-20-25 @ 04:49)  HR: 101 (03-20-25 @ 11:35) (70 - 101)  BP: 95/67 (03-20-25 @ 11:35) (95/67 - 177/99)  RR: 18 (03-20-25 @ 11:12) (18 - 18)  SpO2: 97% (03-20-25 @ 11:12) (97% - 100%)    I&O's Summary  19 Mar 2025 07:01  -  20 Mar 2025 07:00  --------------------------------------------------------  IN: 1025 mL / OUT: 400 mL / NET: 625 mL    PHYSICAL EXAM:  Appearance: Comfortable. Thin pleasant female in NAD  HEENT:  Atraumatic. Normocephalic.  Normal oral mucosa  Neurologic: A & O x 3, no gross focal deficits.  Cardiovascular: RRR S1 S2, No murmur, no rubs/gallops. No JVD  Respiratory: Lungs clear to auscultation, unlabored   Gastrointestinal:  Soft, Non-tender, + BS  Lower Extremities: + Peripheral Pulses, No clubbing, cyanosis, or edema  Psychiatry: Patient is calm. No agitation.   Skin: warm and dry.    CURRENT CARDIAC MEDICATIONS:  midodrine. 15 milliGRAM(s) Oral three times a day    CURRENT OTHER MEDICATIONS:  levETIRAcetam 1000 milliGRAM(s) Oral two times a day  melatonin 5 milliGRAM(s) Oral at bedtime  methocarbamol 500 milliGRAM(s) Oral three times a day  oxyCODONE    IR 5 milliGRAM(s) Oral every 6 hours PRN Moderate Pain (4 - 6)  oxyCODONE    IR 10 milliGRAM(s) Oral every 6 hours PRN Severe Pain (7 - 10)  sertraline 50 milliGRAM(s) Oral daily  polyethylene glycol 3350 17 Gram(s) Oral daily  dextrose 50% Injectable 12.5 Gram(s) IV Push once, Stop order after: 1 Doses  dextrose 50% Injectable 25 Gram(s) IV Push once, Stop order after: 1 Doses  dextrose 50% Injectable 25 Gram(s) IV Push once, Stop order after: 1 Doses  dextrose Oral Gel 15 Gram(s) Oral once, Stop order after: 1 Doses PRN Blood Glucose LESS THAN 70 milliGRAM(s)/deciliter  fludroCORTISONE 0.05 milliGRAM(s) Oral once, Stop order after: 1 Doses  fludroCORTISONE 0.05 milliGRAM(s) Oral daily  glucagon  Injectable 1 milliGRAM(s) IntraMuscular once, Stop order after: 1 Doses  insulin lispro (HumaLOG) Pump 1 Each SubCutaneous Continuous Pump  rosuvastatin 5 milliGRAM(s) Oral at bedtime  dextrose 5%. 1000 milliLiter(s) (50 mL/Hr) IV Continuous <Continuous>  dextrose 5%. 1000 milliLiter(s) (100 mL/Hr) IV Continuous <Continuous>  enoxaparin Injectable 40 milliGRAM(s) SubCutaneous every 24 hours  lidocaine   4% Patch 2 Patch Transdermal every 24 hours  multivitamin 1 Tablet(s) Oral daily      LABS:	 	                       9.7    8.09  )-----------( 273      ( 20 Mar 2025 06:18 )             30.5     03-20    142  |  106  |  17.9  ----------------------------<  145[H]  4.2   |  26.0  |  0.51    Ca    8.7      20 Mar 2025 06:18    TPro  6.3[L]  /  Alb  3.5  /  TBili  <0.2[L]  /  DBili  x   /  AST  35[H]  /  ALT  60[H]  /  AlkPhos  53  03-20      TSH: Thyroid Stimulating Hormone, Serum: 1.49 uIU/mL  Thyroid Stimulating Hormone, Serum: 0.87 uIU/mL      TELEMETRY:   Sr 90's, no acute alarms noted.        [ ]  Catheterization:  11/2024 at [facility]  Coronary Findings Diagnostic Dominance: Right  Left Main: The vessel exhibits minimal luminal irregularities.  Left Anterior Descending: The vessel exhibits minimal luminal irregularities.  Left Circumflex: The vessel exhibits minimal luminal irregularities.  Right Coronary Artery: The vessel exhibits minimal luminal irregularities.  Intervention  No interventions have been documented.

## 2025-03-20 NOTE — PROGRESS NOTE ADULT - SUBJECTIVE AND OBJECTIVE BOX
CC: Patient being seen for rehabilitation follow up.  Patient with limited ability to participate and tolerate a short walk.  Limited activity during the day.     FUNCTIONAL PROGRESS  3/19 PT  Sit-Stand Transfer Training  Sit-to-Stand Transfer Training Rehab Potential: good, to achieve stated therapy goals  Sit-to-Stand Transfer Training Symptoms Noted During/After Treatment: none  Transfer Training Sit-to-Stand Transfer: minimum assist (75% patient effort);  1 person assist;  weight-bearing as tolerated   rolling walker    Gait Training  Gait Training Rehab Potential: good, to achieve stated therapy goals  Gait Training Symptoms Noted During/After Treatment: syncopal episode  Gait Training: minimum assist (75% patient effort);  1 person assist;  weight-bearing as tolerated   rolling walker;  30 feet  Gait Analysis: 3-point gait   decreased yang;  decreased step length;  decreased stride length;  decreased strength;  impaired balance;  syncopal episode      VITALS  T(C): 37.1 (03-20-25 @ 04:49), Max: 37.1 (03-20-25 @ 04:49)  HR: 70 (03-20-25 @ 04:49) (70 - 98)  BP: 138/89 (03-20-25 @ 04:49) (87/59 - 177/99)  RR: 18 (03-20-25 @ 04:49) (18 - 18)  SpO2: 98% (03-20-25 @ 04:49) (98% - 100%)  Wt(kg): --    MEDICATIONS   dextrose 5%. 1000 milliLiter(s) <Continuous>  dextrose 5%. 1000 milliLiter(s) <Continuous>  dextrose 50% Injectable 25 Gram(s) once  dextrose 50% Injectable 12.5 Gram(s) once  dextrose 50% Injectable 25 Gram(s) once  dextrose Oral Gel 15 Gram(s) once PRN  enoxaparin Injectable 40 milliGRAM(s) every 24 hours  fludroCORTISONE 0.05 milliGRAM(s) daily  glucagon  Injectable 1 milliGRAM(s) once  insulin lispro (HumaLOG) Pump 1 Each Continuous Pump  levETIRAcetam 1000 milliGRAM(s) two times a day  lidocaine   4% Patch 2 Patch every 24 hours  melatonin 5 milliGRAM(s) at bedtime  methocarbamol 500 milliGRAM(s) three times a day  midodrine. 15 milliGRAM(s) three times a day  multivitamin 1 Tablet(s) daily  oxyCODONE    IR 5 milliGRAM(s) every 6 hours PRN  oxyCODONE    IR 10 milliGRAM(s) every 6 hours PRN  polyethylene glycol 3350 17 Gram(s) daily  rosuvastatin 5 milliGRAM(s) at bedtime  sertraline 50 milliGRAM(s) daily      RECENT LABS/IMAGING  - Reviewed Today                        9.7    8.09  )-----------( 273      ( 20 Mar 2025 06:18 )             30.5     03-20    142  |  106  |  17.9  ----------------------------<  145[H]  4.2   |  26.0  |  0.51    Ca    8.7      20 Mar 2025 06:18    TPro  6.3[L]  /  Alb  3.5  /  TBili  <0.2[L]  /  DBili  x   /  AST  35[H]  /  ALT  60[H]  /  AlkPhos  53  03-20      Urinalysis Basic - ( 20 Mar 2025 06:18 )    Color: x / Appearance: x / SG: x / pH: x  Gluc: 145 mg/dL / Ketone: x  / Bili: x / Urobili: x   Blood: x / Protein: x / Nitrite: x   Leuk Esterase: x / RBC: x / WBC x   Sq Epi: x / Non Sq Epi: x / Bacteria: x              HEAD CT 3/10 -  No acute intracranial hemorrhage, mass effect, or shift of the midline structures. Unchanged nonspecific adenoidal hypertrophy.    TTE 3/10 -  1. Left atrium is normal in size.   2. Left ventricular systolic function is mildly decreased with an ejection fraction of 46 % by Grey's method of disks. Global left ventricular hypokinesis.   3. Left ventricular global longitudinal strain is -11.0 % is abnormal (> -16%).   4. There is mild (grade 1) left ventricular diastolic dysfunction.   5. The right atrium is normal in size.   6. Normal right ventricular cavity size and normal right ventricular systolic function.   7. No significant valvular disease.   8. No pericardial effusion seen.   9. 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      Consider Cardiac CT angiography to assess for anomalous coronary artery if clinically indicated.          ----------------------------------------------------------------------------------------  PHYSICAL EXAM  Constitutional - NAD, Appears Comfortable    Neuro - AAOx4  Psychiatric - Fatigued  ----------------------------------------------------------------------------------------  ASSESSMENT/PLAN  35yFemale with functional deficits after developing breakthrough seizures  Breakthrough Seizures - Keppra  Syncope/Orthostatic Hypotension/HTN/?POTS - Midodrine PRN  DM1 - Humalog Pump, ISS  CAD - Crestor  Mood D/O - Zoloft  Sleep - Melatonin   Pain - Tylenol, Oxycodone, Lidoderm, Robaxin, DC IV DILAUDID  DVT PPX - SCDs, Lovenox  Rehab/Impaired mobility and function - Patient continues to require hospitalization for the above diagnoses and ongoing active management of comorbid complications that are substantially posing a threat to bodily function, functional ability and quality of life.     RECOMMEND - OOB daily     Patient unable to tolerate more than a few minutes of therapy.   Patient will not be able to tolerate an intense 3hr a day program.  Recommend home.

## 2025-03-21 ENCOUNTER — TRANSCRIPTION ENCOUNTER (OUTPATIENT)
Age: 35
End: 2025-03-21

## 2025-03-21 LAB
ALBUMIN SERPL ELPH-MCNC: 3.5 G/DL — SIGNIFICANT CHANGE UP (ref 3.3–5.2)
ALP SERPL-CCNC: 55 U/L — SIGNIFICANT CHANGE UP (ref 40–120)
ALT FLD-CCNC: 91 U/L — HIGH
ANION GAP SERPL CALC-SCNC: 11 MMOL/L — SIGNIFICANT CHANGE UP (ref 5–17)
AST SERPL-CCNC: 63 U/L — HIGH
BILIRUB SERPL-MCNC: <0.2 MG/DL — LOW (ref 0.4–2)
BUN SERPL-MCNC: 20.9 MG/DL — HIGH (ref 8–20)
CALCIUM SERPL-MCNC: 8.8 MG/DL — SIGNIFICANT CHANGE UP (ref 8.4–10.5)
CHLORIDE SERPL-SCNC: 107 MMOL/L — SIGNIFICANT CHANGE UP (ref 96–108)
CO2 SERPL-SCNC: 26 MMOL/L — SIGNIFICANT CHANGE UP (ref 22–29)
CREAT SERPL-MCNC: 0.55 MG/DL — SIGNIFICANT CHANGE UP (ref 0.5–1.3)
EGFR: 123 ML/MIN/1.73M2 — SIGNIFICANT CHANGE UP
EGFR: 123 ML/MIN/1.73M2 — SIGNIFICANT CHANGE UP
GLUCOSE BLDC GLUCOMTR-MCNC: 112 MG/DL — HIGH (ref 70–99)
GLUCOSE BLDC GLUCOMTR-MCNC: 115 MG/DL — HIGH (ref 70–99)
GLUCOSE BLDC GLUCOMTR-MCNC: 124 MG/DL — HIGH (ref 70–99)
GLUCOSE BLDC GLUCOMTR-MCNC: 146 MG/DL — HIGH (ref 70–99)
GLUCOSE SERPL-MCNC: 100 MG/DL — HIGH (ref 70–99)
HCT VFR BLD CALC: 30.9 % — LOW (ref 34.5–45)
HGB BLD-MCNC: 9.8 G/DL — LOW (ref 11.5–15.5)
MCHC RBC-ENTMCNC: 28.3 PG — SIGNIFICANT CHANGE UP (ref 27–34)
MCHC RBC-ENTMCNC: 31.7 G/DL — LOW (ref 32–36)
MCV RBC AUTO: 89.3 FL — SIGNIFICANT CHANGE UP (ref 80–100)
NRBC # BLD AUTO: 0 K/UL — SIGNIFICANT CHANGE UP (ref 0–0)
NRBC # FLD: 0 K/UL — SIGNIFICANT CHANGE UP (ref 0–0)
NRBC BLD AUTO-RTO: 0 /100 WBCS — SIGNIFICANT CHANGE UP (ref 0–0)
PLATELET # BLD AUTO: 288 K/UL — SIGNIFICANT CHANGE UP (ref 150–400)
PMV BLD: 10.9 FL — SIGNIFICANT CHANGE UP (ref 7–13)
POTASSIUM SERPL-MCNC: 4 MMOL/L — SIGNIFICANT CHANGE UP (ref 3.5–5.3)
POTASSIUM SERPL-SCNC: 4 MMOL/L — SIGNIFICANT CHANGE UP (ref 3.5–5.3)
PROT SERPL-MCNC: 6.4 G/DL — LOW (ref 6.6–8.7)
RBC # BLD: 3.46 M/UL — LOW (ref 3.8–5.2)
RBC # FLD: 12.4 % — SIGNIFICANT CHANGE UP (ref 10.3–14.5)
SODIUM SERPL-SCNC: 144 MMOL/L — SIGNIFICANT CHANGE UP (ref 135–145)
WBC # BLD: 8.26 K/UL — SIGNIFICANT CHANGE UP (ref 3.8–10.5)
WBC # FLD AUTO: 8.26 K/UL — SIGNIFICANT CHANGE UP (ref 3.8–10.5)

## 2025-03-21 PROCEDURE — 99232 SBSQ HOSP IP/OBS MODERATE 35: CPT

## 2025-03-21 PROCEDURE — 99233 SBSQ HOSP IP/OBS HIGH 50: CPT

## 2025-03-21 RX ORDER — ACETAMINOPHEN 500 MG/5ML
725 LIQUID (ML) ORAL ONCE
Refills: 0 | Status: COMPLETED | OUTPATIENT
Start: 2025-03-21 | End: 2025-03-21

## 2025-03-21 RX ORDER — TRAMADOL HYDROCHLORIDE 50 MG/1
25 TABLET, FILM COATED ORAL ONCE
Refills: 0 | Status: DISCONTINUED | OUTPATIENT
Start: 2025-03-21 | End: 2025-03-21

## 2025-03-21 RX ORDER — KETOROLAC TROMETHAMINE 30 MG/ML
15 INJECTION, SOLUTION INTRAMUSCULAR; INTRAVENOUS EVERY 6 HOURS
Refills: 0 | Status: DISCONTINUED | OUTPATIENT
Start: 2025-03-21 | End: 2025-03-24

## 2025-03-21 RX ADMIN — LIDOCAINE HYDROCHLORIDE 2 PATCH: 20 JELLY TOPICAL at 19:00

## 2025-03-21 RX ADMIN — MIDODRINE HYDROCHLORIDE 15 MILLIGRAM(S): 5 TABLET ORAL at 06:08

## 2025-03-21 RX ADMIN — Medication 5 MILLIGRAM(S): at 21:47

## 2025-03-21 RX ADMIN — Medication 290 MILLIGRAM(S): at 21:42

## 2025-03-21 RX ADMIN — KETOROLAC TROMETHAMINE 15 MILLIGRAM(S): 30 INJECTION, SOLUTION INTRAMUSCULAR; INTRAVENOUS at 13:29

## 2025-03-21 RX ADMIN — Medication 0.1 MILLIGRAM(S): at 06:07

## 2025-03-21 RX ADMIN — Medication 725 MILLIGRAM(S): at 22:00

## 2025-03-21 RX ADMIN — METHOCARBAMOL 500 MILLIGRAM(S): 500 TABLET, FILM COATED ORAL at 13:30

## 2025-03-21 RX ADMIN — OXYCODONE HYDROCHLORIDE 10 MILLIGRAM(S): 30 TABLET ORAL at 03:00

## 2025-03-21 RX ADMIN — TRAMADOL HYDROCHLORIDE 25 MILLIGRAM(S): 50 TABLET, FILM COATED ORAL at 22:37

## 2025-03-21 RX ADMIN — SERTRALINE 50 MILLIGRAM(S): 100 TABLET, FILM COATED ORAL at 11:38

## 2025-03-21 RX ADMIN — LEVETIRACETAM 1000 MILLIGRAM(S): 10 INJECTION, SOLUTION INTRAVENOUS at 17:24

## 2025-03-21 RX ADMIN — TRAMADOL HYDROCHLORIDE 25 MILLIGRAM(S): 50 TABLET, FILM COATED ORAL at 19:40

## 2025-03-21 RX ADMIN — ROSUVASTATIN CALCIUM 5 MILLIGRAM(S): 5 TABLET, FILM COATED ORAL at 21:47

## 2025-03-21 RX ADMIN — MIDODRINE HYDROCHLORIDE 15 MILLIGRAM(S): 5 TABLET ORAL at 17:24

## 2025-03-21 RX ADMIN — LEVETIRACETAM 1000 MILLIGRAM(S): 10 INJECTION, SOLUTION INTRAVENOUS at 06:08

## 2025-03-21 RX ADMIN — KETOROLAC TROMETHAMINE 15 MILLIGRAM(S): 30 INJECTION, SOLUTION INTRAMUSCULAR; INTRAVENOUS at 13:45

## 2025-03-21 RX ADMIN — METHOCARBAMOL 500 MILLIGRAM(S): 500 TABLET, FILM COATED ORAL at 21:47

## 2025-03-21 RX ADMIN — MIDODRINE HYDROCHLORIDE 15 MILLIGRAM(S): 5 TABLET ORAL at 11:38

## 2025-03-21 RX ADMIN — METHOCARBAMOL 500 MILLIGRAM(S): 500 TABLET, FILM COATED ORAL at 06:08

## 2025-03-21 RX ADMIN — LIDOCAINE HYDROCHLORIDE 2 PATCH: 20 JELLY TOPICAL at 17:23

## 2025-03-21 RX ADMIN — TRAMADOL HYDROCHLORIDE 25 MILLIGRAM(S): 50 TABLET, FILM COATED ORAL at 23:00

## 2025-03-21 RX ADMIN — Medication 0.05 MILLIGRAM(S): at 06:07

## 2025-03-21 RX ADMIN — Medication 1 TABLET(S): at 11:38

## 2025-03-21 RX ADMIN — ENOXAPARIN SODIUM 40 MILLIGRAM(S): 100 INJECTION SUBCUTANEOUS at 22:37

## 2025-03-21 RX ADMIN — TRAMADOL HYDROCHLORIDE 25 MILLIGRAM(S): 50 TABLET, FILM COATED ORAL at 18:42

## 2025-03-21 RX ADMIN — OXYCODONE HYDROCHLORIDE 10 MILLIGRAM(S): 30 TABLET ORAL at 02:28

## 2025-03-21 NOTE — DISCHARGE NOTE PROVIDER - HOSPITAL COURSE
34 year old female who is being managed as an inpatient for breakthrough seizure and syncope. She has a past medical history of type I DM on insulin pump, TIA, seizure disorder. Prior to hospitalization, she was recently diagnosed with new onset CHF without a clear etiology, and she has been in and out of the hospital since November. She has not fully recovered and has been having increased difficulty walking. Patient is being managed as an inpatient for breakthrough seizure requiring vEEG which did not show any new seizures. Patient noted to have persistent orthostatic hypotension likely 2/2 autonomic dysfunction. Patient started on midodrine and fludrocortisone but still with orthostatic hypotension. Patient encouraged to use bilateral ACE wraps in LE and abdominal binder but often not usin git. Opioids being held due to hypotension. Plan to discharge to AR but pt unable to tolerate. At this time patient is FORREST vs home.   34 year old female who is being managed as an inpatient for breakthrough seizure and syncope. She has a past medical history of type I DM on insulin pump, TIA, seizure disorder. Prior to hospitalization, she was recently diagnosed with new onset CHF without a clear etiology, and she has been in and out of the hospital since November. She has not fully recovered and has been having increased difficulty walking. Patient is being managed as an inpatient for breakthrough seizure requiring vEEG which did not show any new seizures. Patient noted to have persistent orthostatic hypotension likely 2/2 autonomic dysfunction. Patient started on midodrine and fludrocortisone but still with orthostatic hypotension. Patient encouraged to use bilateral ACE wraps in LE and abdominal binder but often not using it. Opioids being held due to hypotension. Plan to discharge to AR but pt unable to tolerate. At this time patient is for discharge to home with PT.

## 2025-03-21 NOTE — PROGRESS NOTE ADULT - ASSESSMENT
34 year old female who is being managed as an inpatient for breakthrough seizure and syncope. She has a past medical history of type I DM on insulin pump, TIA, seizure disorder. Prior to hospitalization, she was recently diagnosed with new onset CHF without a clear etiology, and she has been in and out of the hospital since November. She has not fully recovered and has been having increased difficulty walking. Patient is being managed as an inpatient for breakthrough seizure requiring vEEG.     Syncope  Orthostatic hypotension likely secondary to autonomic dysfunction  Heart failure with reduced ejection fracture  - CT head negative on 3/8 and 3/10  - TTE showing LVEF is mildly decreased at 46%, global left ventricular hypokinesis  - Carotid doppler was unremarkable; Lyme serology negative  - Alert and oriented x3, answers questions and engages in conversation  - Likely due to uncontrolled DM which caused autonomic dysfunction  - Discontinued metoprolol  - Continue fall precautions  - Continue compression stockings and abdominal binder (needs appropriate size)  - wmidodrine dose to 15mg tid  -cw  fludrocortisone  - Cardiology and Neurology following / cardiology called for re-evaluation    Chronic pain  -will dc oxycodone and trial toradol at this time due to ongoing hypotension  -pain management consult if warranted    Breakthrough seizure  Leukocytosis likely related to seizure, resolved  - s/p Keppra 1000mg IV once  - White count normalized, afebrile  - CT head negative on 3/8 and 3/10  - vEEG showing no new seizures  - Continue seizure precautions  - Continue Keppra 1000mg PO BID  - Neurology following, recs noted    Type I DM  - A1c level 7  - Patient has insulin pump  - C-peptide 0.8 with glucose 108/JOSE and islet cell ab pending  - Endocrinology following and managing    Urinary tract infection  - s/p Rocephin for total 3 days    MDD  - Continue Zoloft    HLD  - Continue Crestor      DVT/GI ppx: Lovenox  Diet: Carb consistent  Code Status: Full code  Dispo: may not be a candidate for AR at this time

## 2025-03-21 NOTE — PROGRESS NOTE ADULT - SUBJECTIVE AND OBJECTIVE BOX
[facility] Division of Hospital Medicine    pt  seen and examined at bedside  pt still orthostatic   when at rest feeling okay, no cp, sob, abd pain, nausea, vomiting  tolerating po diet  normal stooling and urination    MEDICATIONS  (STANDING):  dextrose 5%. 1000 milliLiter(s) (50 mL/Hr) IV Continuous <Continuous>  dextrose 5%. 1000 milliLiter(s) (100 mL/Hr) IV Continuous <Continuous>  dextrose 50% Injectable 25 Gram(s) IV Push once  dextrose 50% Injectable 12.5 Gram(s) IV Push once  dextrose 50% Injectable 25 Gram(s) IV Push once  enoxaparin Injectable 40 milliGRAM(s) SubCutaneous every 24 hours  fludroCORTISONE 0.1 milliGRAM(s) Oral daily  fludroCORTISONE 0.05 milliGRAM(s) Oral daily  glucagon  Injectable 1 milliGRAM(s) IntraMuscular once  insulin lispro (HumaLOG) Pump 1 Each SubCutaneous Continuous Pump  levETIRAcetam 1000 milliGRAM(s) Oral two times a day  lidocaine   4% Patch 2 Patch Transdermal every 24 hours  melatonin 5 milliGRAM(s) Oral at bedtime  methocarbamol 500 milliGRAM(s) Oral three times a day  midodrine. 15 milliGRAM(s) Oral three times a day  multivitamin 1 Tablet(s) Oral daily  polyethylene glycol 3350 17 Gram(s) Oral daily  rosuvastatin 5 milliGRAM(s) Oral at bedtime  sertraline 50 milliGRAM(s) Oral daily    MEDICATIONS  (PRN):  dextrose Oral Gel 15 Gram(s) Oral once PRN Blood Glucose LESS THAN 70 milliGRAM(s)/deciliter  oxyCODONE    IR 5 milliGRAM(s) Oral every 6 hours PRN Moderate Pain (4 - 6)  oxyCODONE    IR 10 milliGRAM(s) Oral every 6 hours PRN Severe Pain (7 - 10)        I&O's Summary      PHYSICAL EXAM:  Vital Signs Last 24 Hrs  T(C): 36.6 (21 Mar 2025 11:32), Max: 36.6 (20 Mar 2025 21:26)  T(F): 97.8 (21 Mar 2025 11:32), Max: 97.8 (20 Mar 2025 21:26)  HR: 80 (21 Mar 2025 11:33) (80 - 89)  BP: 119/91 (21 Mar 2025 11:34) (102/68 - 166/109)  BP(mean): --  RR: 18 (21 Mar 2025 11:34) (18 - 18)  SpO2: 98% (21 Mar 2025 11:34) (97% - 100%)    Parameters below as of 21 Mar 2025 11:34  Patient On (Oxygen Delivery Method): room air            CONSTITUTIONAL: NAD, well-groomed  ENMT: Moist oral mucosa, no pharyngeal injection or exudates; normal dentition  RESPIRATORY: Normal respiratory effort; lungs are clear to auscultation bilaterally  CARDIOVASCULAR: Regular rate and rhythm, normal S1 and S2, no murmur/rub/gallop; No lower extremity edema; Peripheral pulses are 2+ bilaterally  ABDOMEN: Nontender to palpation, normoactive bowel sounds, no rebound/guarding; No hepatosplenomegaly  MUSCLOSKELETAL:  Normal gait; no clubbing or cyanosis of digits; no joint swelling or tenderness to palpation  PSYCH: A+O to person, place, and time; affect appropriate  NEUROLOGY: CN 2-12 are intact and symmetric; no gross sensory deficits;   SKIN: No rashes; no palpable lesions    LABS:                        9.8    8.26  )-----------( 288      ( 21 Mar 2025 05:20 )             30.9     03-21    144  |  107  |  20.9[H]  ----------------------------<  100[H]  4.0   |  26.0  |  0.55    Ca    8.8      21 Mar 2025 05:20    TPro  6.4[L]  /  Alb  3.5  /  TBili  <0.2[L]  /  DBili  x   /  AST  63[H]  /  ALT  91[H]  /  AlkPhos  55  03-21          Urinalysis Basic - ( 21 Mar 2025 05:20 )    Color: x / Appearance: x / SG: x / pH: x  Gluc: 100 mg/dL / Ketone: x  / Bili: x / Urobili: x   Blood: x / Protein: x / Nitrite: x   Leuk Esterase: x / RBC: x / WBC x   Sq Epi: x / Non Sq Epi: x / Bacteria: x        CAPILLARY BLOOD GLUCOSE      POCT Blood Glucose.: 112 mg/dL (21 Mar 2025 11:37)  POCT Blood Glucose.: 115 mg/dL (21 Mar 2025 08:49)  POCT Blood Glucose.: 170 mg/dL (20 Mar 2025 22:05)  POCT Blood Glucose.: 121 mg/dL (20 Mar 2025 17:05)  POCT Blood Glucose.: 148 mg/dL (20 Mar 2025 12:44)

## 2025-03-21 NOTE — PROGRESS NOTE ADULT - SUBJECTIVE AND OBJECTIVE BOX
CC: Patient being seen for rehabilitation follow up.  Patient resting comfortably.  Making progress with PT.  Agree with plan for home once medically optimized.   Medically having ongoing hypo-hyper TN.  LFTs are elevated.     FUNCTIONAL PROGRESS  3/20 PT  Bed Mobility  Bed Mobility Training Supine-to-Sit: supervsion;  bed rails;  HOB elevated    Sit-Stand Transfer Training  Transfer Training Sit-to-Stand Transfer: supervsion;  rolling walker  Transfer Training Stand-to-Sit Transfer: supervsion;  rolling walker    Gait Training  Gait Training: contact guard;  rolling walker;  5 feet;  bed to chair;  followed up 10 minutes of standing ther-ex with simultaneous BPs.  Gait Analysis: 3-point gait   sensation of passing out      VITALS  T(C): 36.5 (03-21-25 @ 04:52), Max: 36.8 (03-20-25 @ 11:12)  HR: 89 (03-21-25 @ 04:52) (81 - 101)  BP: 115/69 (03-21-25 @ 04:52) (95/67 - 177/99)  RR: 18 (03-21-25 @ 04:52) (18 - 18)  SpO2: 97% (03-21-25 @ 04:52) (97% - 100%)  Wt(kg): --    MEDICATIONS   dextrose 5%. 1000 milliLiter(s) <Continuous>  dextrose 5%. 1000 milliLiter(s) <Continuous>  dextrose 50% Injectable 25 Gram(s) once  dextrose 50% Injectable 12.5 Gram(s) once  dextrose 50% Injectable 25 Gram(s) once  dextrose Oral Gel 15 Gram(s) once PRN  enoxaparin Injectable 40 milliGRAM(s) every 24 hours  fludroCORTISONE 0.1 milliGRAM(s) daily  fludroCORTISONE 0.05 milliGRAM(s) daily  glucagon  Injectable 1 milliGRAM(s) once  insulin lispro (HumaLOG) Pump 1 Each Continuous Pump  levETIRAcetam 1000 milliGRAM(s) two times a day  lidocaine   4% Patch 2 Patch every 24 hours  melatonin 5 milliGRAM(s) at bedtime  methocarbamol 500 milliGRAM(s) three times a day  midodrine. 15 milliGRAM(s) three times a day  multivitamin 1 Tablet(s) daily  oxyCODONE    IR 5 milliGRAM(s) every 6 hours PRN  oxyCODONE    IR 10 milliGRAM(s) every 6 hours PRN  polyethylene glycol 3350 17 Gram(s) daily  rosuvastatin 5 milliGRAM(s) at bedtime  sertraline 50 milliGRAM(s) daily      RECENT LABS/IMAGING  - Reviewed Today                        9.8    8.26  )-----------( 288      ( 21 Mar 2025 05:20 )             30.9     03-21    144  |  107  |  20.9[H]  ----------------------------<  100[H]  4.0   |  26.0  |  0.55    Ca    8.8      21 Mar 2025 05:20    TPro  6.4[L]  /  Alb  3.5  /  TBili  <0.2[L]  /  DBili  x   /  AST  63[H]  /  ALT  91[H]  /  AlkPhos  55  03-21      Urinalysis Basic - ( 21 Mar 2025 05:20 )    Color: x / Appearance: x / SG: x / pH: x  Gluc: 100 mg/dL / Ketone: x  / Bili: x / Urobili: x   Blood: x / Protein: x / Nitrite: x   Leuk Esterase: x / RBC: x / WBC x   Sq Epi: x / Non Sq Epi: x / Bacteria: x              HEAD CT 3/10 -  No acute intracranial hemorrhage, mass effect, or shift of the midline structures. Unchanged nonspecific adenoidal hypertrophy.    TTE 3/10 -  1. Left atrium is normal in size.   2. Left ventricular systolic function is mildly decreased with an ejection fraction of 46 % by Grey's method of disks. Global left ventricular hypokinesis.   3. Left ventricular global longitudinal strain is -11.0 % is abnormal (> -16%).   4. There is mild (grade 1) left ventricular diastolic dysfunction.   5. The right atrium is normal in size.   6. Normal right ventricular cavity size and normal right ventricular systolic function.   7. No significant valvular disease.   8. No pericardial effusion seen.   9. 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      Consider Cardiac CT angiography to assess for anomalous coronary artery if clinically indicated.          ----------------------------------------------------------------------------------------  PHYSICAL EXAM  Constitutional - NAD, Appears Comfortable    Neuro - AAOx4  Psychiatric - Fatigued  ----------------------------------------------------------------------------------------  ASSESSMENT/PLAN  35yFemale with functional deficits after developing breakthrough seizures  Breakthrough Seizures - Keppra  Syncope/Orthostatic Hypotension/HTN/?POTS - Midodrine, Florinef   DM1 - Humalog Pump, ISS  CAD - Crestor  Mood D/O - Zoloft  Sleep - Melatonin   Abnormal LFT's - Increased  Pain - Tylenol, RECOMMEND DC Oxycodone, CONSIDER ADDING NEURONTIN 300mg Q8, CONSIDER PAIN MANAGEMENT, Lidoderm  DVT PPX - SCDs, Lovenox  Rehab/Impaired mobility and function - Patient continues to require hospitalization for the above diagnoses and ongoing active management of comorbid complications that are substantially posing a threat to bodily function, functional ability and quality of life.     RECOMMEND - OOB daily     Patient appropriate for DC HOME.   Would not be able to tolerate a 3hr/day program and functional status has been inconsistent to demonstrate progress.

## 2025-03-21 NOTE — PROGRESS NOTE ADULT - ASSESSMENT
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 Admitted for seizures. Of note, patient missed about 1-2 weeks of keppra due to meds not being renewed and only restarted on keppra recently. Consult for diabetes mgmt, a1c 7.    1. Moderately controlled T1DM  - May continue to use insulin pump, orders in place  - Please check fingersticks before meals and at bedtime while on insulin pump  - Has dexcom sensor in place  - If patient is off insulin pump for any reason, start lantus 4 units with low dose sliding scale with meals  - C-peptide 0.8 with glucose 108/JOSE and islet cell ab are negative    2. Seizures  - On Keppra   - Care per neuro/primary team    3. Syncope/autonomic dysfunction  - Symptomatic hypotension, on midodrine  - TTE showed reduced EF with diastolic dysfunction    4. Transaminases  - LFTs elevated, continue to monitor, care per primary team

## 2025-03-21 NOTE — DISCHARGE NOTE PROVIDER - NSDCFUADDAPPT_GEN_ALL_CORE_FT
APPTS ARE READY TO BE MADE: [X] YES    Best Family or Patient Contact (if needed):    Additional Information about above appointments (if needed):    1: Follow up with PCP in 1 week  2: Follow up with Cardiology in 1 week  3: Follow up with Endocrinology in 1 week  4: Follow up with Neurology in 2 weeks    Other comments or requests:    APPTS ARE READY TO BE MADE: [X] YES    Best Family or Patient Contact (if needed):    Additional Information about above appointments (if needed):    1: Follow up with PCP in 1 week  2: Follow up with Cardiology in 1 week  3: Follow up with Endocrinology in 1 week  4: Follow up with Neurology in 2 weeks    Other comments or requests:   Patient advises they do not want our assistance and prefer to coordinate the [facility] appointments on their own. No information was provided to the patient, however pending the referral to capture potential appointment data once scheduled by the patient.

## 2025-03-21 NOTE — DISCHARGE NOTE PROVIDER - ATTENDING DISCHARGE PHYSICAL EXAMINATION:
CONSTITUTIONAL: NAD, well-developed, well-groomed  EYES:  EOMI, conjunctiva and sclera clear  ENMT: Moist oral mucosa  NECK: Supple, no JVD  RESPIRATORY: Normal respiratory effort; lungs are clear to auscultation bilaterally  CARDIOVASCULAR: Regular rate and rhythm, normal S1 and S2, no murmur/rub/gallop; No lower extremity edema  ABDOMEN: normoactive bowel sounds, soft, nontender to palpation, no distension   MUSCULOSKELETAL:  no clubbing or cyanosis of digits; no joint swelling or tenderness to palpation  PSYCH: A+O x3; affect appropriate, calm and cooperative  NEUROLOGY: CN 2-12 are intact and symmetric; no gross sensory deficits

## 2025-03-21 NOTE — DISCHARGE NOTE PROVIDER - NSDCFUSCHEDAPPT_GEN_ALL_CORE_FT
Anand Orta Physician [facility]  NEUROLOGY [address]  Scheduled Appointment: 06/23/2025

## 2025-03-21 NOTE — PROGRESS NOTE ADULT - NS ATTEND AMEND GEN_ALL_CORE FT
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 Glucoses well controlled insulin pump therapy. Remaining plan as above.

## 2025-03-21 NOTE — DISCHARGE NOTE PROVIDER - NSDCMRMEDTOKEN_GEN_ALL_CORE_FT
Aldactone 50 mg oral tablet: 1 tab(s) orally once a day  Crestor 5 mg oral tablet: 1 tab(s) orally once a day (at bedtime)  docusate sodium 100 mg oral capsule: 1 cap(s) orally 2 times a day  Keppra 1000 mg oral tablet: 1 tab(s) orally 2 times a day  magnesium oxide: 1 3 times a day  methocarbamol 500 mg oral tablet: 1 tab(s) orally 3 times a day  metoprolol tartrate 25 mg oral tablet: 1 tab(s) orally 2 times a day  midodrine 10 mg oral tablet: 1 tab(s) orally 3 times a day as needed for orthostatic hypotension  Physical therapy: 2 to 3 times per week for 2 weeks.  Dx: Chronic lower back pain. ICD10: M54.5  polyethylene glycol 3350 oral powder for reconstitution: 17 gram(s) orally once  potassium chloride: 20 milliequivalent(s) once a day  senna oral tablet: 2 tab(s) orally once a day (at bedtime)  Zoloft 50 mg oral tablet: 1 tab(s) orally once a day   fludrocortisone 0.1 mg oral tablet: 1 tab(s) orally once a day  levETIRAcetam 1000 mg oral tablet: 1 tab(s) orally 2 times a day  melatonin 5 mg oral tablet: 1 tab(s) orally once a day (at bedtime)  methocarbamol 500 mg oral tablet: 1 tab(s) orally 3 times a day  midodrine 5 mg oral tablet: 3 tab(s) orally 3 times a day  Multiple Vitamins oral tablet: 1 tab(s) orally once a day  oxyCODONE 5 mg oral tablet: 1 tab(s) orally every 6 hours as needed for Moderate Pain (4 - 6) MDD: 15mg  Physical therapy: 2 to 3 times per week for 2 weeks.  Dx: Chronic lower back pain. ICD10: M54.5  polyethylene glycol 3350 oral powder for reconstitution: 17 gram(s) orally once  senna oral tablet: 2 tab(s) orally once a day (at bedtime)  sertraline 100 mg oral tablet: 1 tab(s) orally once a day   fludrocortisone 0.1 mg oral tablet: 1 tab(s) orally once a day  levETIRAcetam 1000 mg oral tablet: 1 tab(s) orally 2 times a day  melatonin 5 mg oral tablet: 1 tab(s) orally once a day (at bedtime)  methocarbamol 500 mg oral tablet: 1 tab(s) orally 3 times a day  midodrine 5 mg oral tablet: 3 tab(s) orally 3 times a day  Multiple Vitamins oral tablet: 1 tab(s) orally once a day  oxyCODONE 5 mg oral tablet: 1 tab(s) orally every 6 hours as needed for Moderate Pain (4 - 6) MDD: 15mg  Physical therapy: 2 to 3 times per week for 2 weeks.  Dx: Chronic lower back pain. ICD10: M54.5  polyethylene glycol 3350 oral powder for reconstitution: 17 gram(s) orally once  senna leaf extract oral tablet: 2 tab(s) orally once a day (at bedtime)  sertraline 100 mg oral tablet: 1 tab(s) orally once a day

## 2025-03-21 NOTE — DISCHARGE NOTE PROVIDER - NSDCCPCAREPLAN_GEN_ALL_CORE_FT
PRINCIPAL DISCHARGE DIAGNOSIS  Diagnosis: Breakthrough seizure  Assessment and Plan of Treatment:

## 2025-03-21 NOTE — PROGRESS NOTE ADULT - SUBJECTIVE AND OBJECTIVE BOX
INTERVAL EVENTS:  Follow up diabetes management, on insulin pump, glucoses stable.    MEDICATIONS  (STANDING):  dextrose 5%. 1000 milliLiter(s) (50 mL/Hr) IV Continuous <Continuous>  dextrose 5%. 1000 milliLiter(s) (100 mL/Hr) IV Continuous <Continuous>  dextrose 50% Injectable 25 Gram(s) IV Push once  dextrose 50% Injectable 12.5 Gram(s) IV Push once  dextrose 50% Injectable 25 Gram(s) IV Push once  enoxaparin Injectable 40 milliGRAM(s) SubCutaneous every 24 hours  fludroCORTISONE 0.1 milliGRAM(s) Oral daily  glucagon  Injectable 1 milliGRAM(s) IntraMuscular once  insulin lispro (HumaLOG) Pump 1 Each SubCutaneous Continuous Pump  levETIRAcetam 1000 milliGRAM(s) Oral two times a day  lidocaine   4% Patch 2 Patch Transdermal every 24 hours  melatonin 5 milliGRAM(s) Oral at bedtime  methocarbamol 500 milliGRAM(s) Oral three times a day  midodrine. 15 milliGRAM(s) Oral three times a day  multivitamin 1 Tablet(s) Oral daily  polyethylene glycol 3350 17 Gram(s) Oral daily  rosuvastatin 5 milliGRAM(s) Oral at bedtime  sertraline 50 milliGRAM(s) Oral daily    MEDICATIONS  (PRN):  dextrose Oral Gel 15 Gram(s) Oral once PRN Blood Glucose LESS THAN 70 milliGRAM(s)/deciliter  ketorolac   Injectable 15 milliGRAM(s) IV Push every 6 hours PRN Moderate Pain (4 - 6)    Allergies  No Known Allergies    Vital Signs Last 24 Hrs  T(C): 36.6 (21 Mar 2025 11:32), Max: 36.6 (20 Mar 2025 21:26)  T(F): 97.8 (21 Mar 2025 11:32), Max: 97.8 (20 Mar 2025 21:26)  HR: 80 (21 Mar 2025 11:33) (80 - 89)  BP: 119/91 (21 Mar 2025 11:34) (102/68 - 166/109)  BP(mean): --  RR: 18 (21 Mar 2025 11:34) (18 - 18)  SpO2: 98% (21 Mar 2025 11:34) (97% - 100%)    Parameters below as of 21 Mar 2025 11:34  Patient On (Oxygen Delivery Method): room air    PHYSICAL EXAM:  General: No apparent distress  Respiratory: Lungs clear bilaterally  Cardiac: +S1, S2, no m/r/g  GI: +BS, soft, non tender, non distended  Extremities: No peripheral edema  Neuro: A+O X3    LABS:                        9.8    8.26  )-----------( 288      ( 21 Mar 2025 05:20 )             30.9     03-21    144  |  107  |  20.9[H]  ----------------------------<  100[H]  4.0   |  26.0  |  0.55    Ca    8.8      21 Mar 2025 05:20    TPro  6.4[L]  /  Alb  3.5  /  TBili  <0.2[L]  /  DBili  x   /  AST  63[H]  /  ALT  91[H]  /  AlkPhos  55  03-21    Urinalysis Basic - ( 21 Mar 2025 05:20 )    Color: x / Appearance: x / SG: x / pH: x  Gluc: 100 mg/dL / Ketone: x  / Bili: x / Urobili: x   Blood: x / Protein: x / Nitrite: x   Leuk Esterase: x / RBC: x / WBC x   Sq Epi: x / Non Sq Epi: x / Bacteria: x    POCT Blood Glucose.: 112 mg/dL (03-21-25 @ 11:37)  POCT Blood Glucose.: 115 mg/dL (03-21-25 @ 08:49)  POCT Blood Glucose.: 170 mg/dL (03-20-25 @ 22:05)  POCT Blood Glucose.: 121 mg/dL (03-20-25 @ 17:05)    Thyroid Stimulating Hormone, Serum: 1.49 uIU/mL (03-19-25 @ 17:05)  Thyroid Stimulating Hormone, Serum: 0.87 uIU/mL (03-19-25 @ 09:11)  Free Thyroxine, Serum: 1.1 ng/dL (03-19-25 @ 09:11)  Triiodothyronine, Total (T3 Total): 83 ng/dL (03-08-25 @ 15:55)  Free Thyroxine, Serum: 1.2 ng/dL (03-08-25 @ 15:55)  Thyroid Stimulating Hormone, Serum: 1.11 uIU/mL (03-08-25 @ 15:55)   INTERVAL EVENTS:  Follow up diabetes management, on insulin pump, glucoses stable.    MEDICATIONS  (STANDING):  dextrose 5%. 1000 milliLiter(s) (50 mL/Hr) IV Continuous <Continuous>  dextrose 5%. 1000 milliLiter(s) (100 mL/Hr) IV Continuous <Continuous>  dextrose 50% Injectable 25 Gram(s) IV Push once  dextrose 50% Injectable 12.5 Gram(s) IV Push once  dextrose 50% Injectable 25 Gram(s) IV Push once  enoxaparin Injectable 40 milliGRAM(s) SubCutaneous every 24 hours  fludroCORTISONE 0.1 milliGRAM(s) Oral daily  glucagon  Injectable 1 milliGRAM(s) IntraMuscular once  insulin lispro (HumaLOG) Pump 1 Each SubCutaneous Continuous Pump  levETIRAcetam 1000 milliGRAM(s) Oral two times a day  lidocaine   4% Patch 2 Patch Transdermal every 24 hours  melatonin 5 milliGRAM(s) Oral at bedtime  methocarbamol 500 milliGRAM(s) Oral three times a day  midodrine. 15 milliGRAM(s) Oral three times a day  multivitamin 1 Tablet(s) Oral daily  polyethylene glycol 3350 17 Gram(s) Oral daily  rosuvastatin 5 milliGRAM(s) Oral at bedtime  sertraline 50 milliGRAM(s) Oral daily    MEDICATIONS  (PRN):  dextrose Oral Gel 15 Gram(s) Oral once PRN Blood Glucose LESS THAN 70 milliGRAM(s)/deciliter  ketorolac   Injectable 15 milliGRAM(s) IV Push every 6 hours PRN Moderate Pain (4 - 6)    Allergies  No Known Allergies    Vital Signs Last 24 Hrs  T(C): 36.6 (21 Mar 2025 11:32), Max: 36.6 (20 Mar 2025 21:26)  T(F): 97.8 (21 Mar 2025 11:32), Max: 97.8 (20 Mar 2025 21:26)  HR: 80 (21 Mar 2025 11:33) (80 - 89)  BP: 119/91 (21 Mar 2025 11:34) (102/68 - 166/109)  BP(mean): --  RR: 18 (21 Mar 2025 11:34) (18 - 18)  SpO2: 98% (21 Mar 2025 11:34) (97% - 100%)    Parameters below as of 21 Mar 2025 11:34  Patient On (Oxygen Delivery Method): room air    PHYSICAL EXAM:  General: No apparent distress  Respiratory: Lungs clear bilaterally  Cardiac: +S1, S2, no m/r/g  GI: +BS, soft, non tender, non distended  Extremities: No peripheral edema  Neuro: A+O X3    LABS:                        9.8    8.26  )-----------( 288      ( 21 Mar 2025 05:20 )             30.9     03-21    144  |  107  |  20.9[H]  ----------------------------<  100[H]  4.0   |  26.0  |  0.55    Ca    8.8      21 Mar 2025 05:20    TPro  6.4[L]  /  Alb  3.5  /  TBili  <0.2[L]  /  DBili  x   /  AST  63[H]  /  ALT  91[H]  /  AlkPhos  55  03-21    POCT Blood Glucose.: 112 mg/dL (03-21-25 @ 11:37)  POCT Blood Glucose.: 115 mg/dL (03-21-25 @ 08:49)  POCT Blood Glucose.: 170 mg/dL (03-20-25 @ 22:05)  POCT Blood Glucose.: 121 mg/dL (03-20-25 @ 17:05)    Thyroid Stimulating Hormone, Serum: 1.49 uIU/mL (03-19-25 @ 17:05)  Thyroid Stimulating Hormone, Serum: 0.87 uIU/mL (03-19-25 @ 09:11)  Free Thyroxine, Serum: 1.1 ng/dL (03-19-25 @ 09:11)  Triiodothyronine, Total (T3 Total): 83 ng/dL (03-08-25 @ 15:55)  Free Thyroxine, Serum: 1.2 ng/dL (03-08-25 @ 15:55)  Thyroid Stimulating Hormone, Serum: 1.11 uIU/mL (03-08-25 @ 15:55)

## 2025-03-21 NOTE — DISCHARGE NOTE PROVIDER - DETAILS OF MALNUTRITION DIAGNOSIS/DIAGNOSES
This patient has been assessed with a concern for Malnutrition and was treated during this hospitalization for the following Nutrition diagnosis/diagnoses:     -  03/10/2025: Severe protein-calorie malnutrition   -  03/10/2025: Underweight (BMI < 19)

## 2025-03-22 LAB
ALBUMIN SERPL ELPH-MCNC: 3.5 G/DL — SIGNIFICANT CHANGE UP (ref 3.3–5.2)
ALP SERPL-CCNC: 65 U/L — SIGNIFICANT CHANGE UP (ref 40–120)
ALT FLD-CCNC: 155 U/L — HIGH
ANION GAP SERPL CALC-SCNC: 12 MMOL/L — SIGNIFICANT CHANGE UP (ref 5–17)
AST SERPL-CCNC: 106 U/L — HIGH
BILIRUB SERPL-MCNC: <0.2 MG/DL — LOW (ref 0.4–2)
BUN SERPL-MCNC: 17.9 MG/DL — SIGNIFICANT CHANGE UP (ref 8–20)
CALCIUM SERPL-MCNC: 8.5 MG/DL — SIGNIFICANT CHANGE UP (ref 8.4–10.5)
CHLORIDE SERPL-SCNC: 103 MMOL/L — SIGNIFICANT CHANGE UP (ref 96–108)
CO2 SERPL-SCNC: 24 MMOL/L — SIGNIFICANT CHANGE UP (ref 22–29)
CREAT SERPL-MCNC: 0.42 MG/DL — LOW (ref 0.5–1.3)
EGFR: 131 ML/MIN/1.73M2 — SIGNIFICANT CHANGE UP
EGFR: 131 ML/MIN/1.73M2 — SIGNIFICANT CHANGE UP
GLUCOSE BLDC GLUCOMTR-MCNC: 117 MG/DL — HIGH (ref 70–99)
GLUCOSE BLDC GLUCOMTR-MCNC: 192 MG/DL — HIGH (ref 70–99)
GLUCOSE BLDC GLUCOMTR-MCNC: 219 MG/DL — HIGH (ref 70–99)
GLUCOSE BLDC GLUCOMTR-MCNC: 236 MG/DL — HIGH (ref 70–99)
GLUCOSE SERPL-MCNC: 202 MG/DL — HIGH (ref 70–99)
HCT VFR BLD CALC: 29 % — LOW (ref 34.5–45)
HGB BLD-MCNC: 9.5 G/DL — LOW (ref 11.5–15.5)
MCHC RBC-ENTMCNC: 29.1 PG — SIGNIFICANT CHANGE UP (ref 27–34)
MCHC RBC-ENTMCNC: 32.8 G/DL — SIGNIFICANT CHANGE UP (ref 32–36)
MCV RBC AUTO: 88.7 FL — SIGNIFICANT CHANGE UP (ref 80–100)
NRBC # BLD AUTO: 0 K/UL — SIGNIFICANT CHANGE UP (ref 0–0)
NRBC # FLD: 0 K/UL — SIGNIFICANT CHANGE UP (ref 0–0)
NRBC BLD AUTO-RTO: 0 /100 WBCS — SIGNIFICANT CHANGE UP (ref 0–0)
PLATELET # BLD AUTO: 262 K/UL — SIGNIFICANT CHANGE UP (ref 150–400)
PMV BLD: 11.1 FL — SIGNIFICANT CHANGE UP (ref 7–13)
POTASSIUM SERPL-MCNC: 3.6 MMOL/L — SIGNIFICANT CHANGE UP (ref 3.5–5.3)
POTASSIUM SERPL-SCNC: 3.6 MMOL/L — SIGNIFICANT CHANGE UP (ref 3.5–5.3)
PROT SERPL-MCNC: 6.2 G/DL — LOW (ref 6.6–8.7)
RBC # BLD: 3.27 M/UL — LOW (ref 3.8–5.2)
RBC # FLD: 12.3 % — SIGNIFICANT CHANGE UP (ref 10.3–14.5)
SODIUM SERPL-SCNC: 139 MMOL/L — SIGNIFICANT CHANGE UP (ref 135–145)
WBC # BLD: 6.68 K/UL — SIGNIFICANT CHANGE UP (ref 3.8–10.5)
WBC # FLD AUTO: 6.68 K/UL — SIGNIFICANT CHANGE UP (ref 3.8–10.5)

## 2025-03-22 PROCEDURE — 99233 SBSQ HOSP IP/OBS HIGH 50: CPT

## 2025-03-22 RX ORDER — ACETAMINOPHEN 500 MG/5ML
725 LIQUID (ML) ORAL ONCE
Refills: 0 | Status: COMPLETED | OUTPATIENT
Start: 2025-03-22 | End: 2025-03-22

## 2025-03-22 RX ORDER — OXYCODONE HYDROCHLORIDE 30 MG/1
5 TABLET ORAL ONCE
Refills: 0 | Status: DISCONTINUED | OUTPATIENT
Start: 2025-03-22 | End: 2025-03-22

## 2025-03-22 RX ADMIN — LIDOCAINE HYDROCHLORIDE 2 PATCH: 20 JELLY TOPICAL at 19:00

## 2025-03-22 RX ADMIN — MIDODRINE HYDROCHLORIDE 15 MILLIGRAM(S): 5 TABLET ORAL at 17:07

## 2025-03-22 RX ADMIN — KETOROLAC TROMETHAMINE 15 MILLIGRAM(S): 30 INJECTION, SOLUTION INTRAMUSCULAR; INTRAVENOUS at 03:25

## 2025-03-22 RX ADMIN — ENOXAPARIN SODIUM 40 MILLIGRAM(S): 100 INJECTION SUBCUTANEOUS at 22:55

## 2025-03-22 RX ADMIN — LEVETIRACETAM 1000 MILLIGRAM(S): 10 INJECTION, SOLUTION INTRAVENOUS at 05:27

## 2025-03-22 RX ADMIN — Medication 0.1 MILLIGRAM(S): at 05:27

## 2025-03-22 RX ADMIN — KETOROLAC TROMETHAMINE 15 MILLIGRAM(S): 30 INJECTION, SOLUTION INTRAMUSCULAR; INTRAVENOUS at 04:00

## 2025-03-22 RX ADMIN — Medication 5 MILLIGRAM(S): at 21:09

## 2025-03-22 RX ADMIN — KETOROLAC TROMETHAMINE 15 MILLIGRAM(S): 30 INJECTION, SOLUTION INTRAMUSCULAR; INTRAVENOUS at 18:06

## 2025-03-22 RX ADMIN — KETOROLAC TROMETHAMINE 15 MILLIGRAM(S): 30 INJECTION, SOLUTION INTRAMUSCULAR; INTRAVENOUS at 18:20

## 2025-03-22 RX ADMIN — LIDOCAINE HYDROCHLORIDE 2 PATCH: 20 JELLY TOPICAL at 05:24

## 2025-03-22 RX ADMIN — SERTRALINE 50 MILLIGRAM(S): 100 TABLET, FILM COATED ORAL at 11:54

## 2025-03-22 RX ADMIN — KETOROLAC TROMETHAMINE 15 MILLIGRAM(S): 30 INJECTION, SOLUTION INTRAMUSCULAR; INTRAVENOUS at 09:57

## 2025-03-22 RX ADMIN — OXYCODONE HYDROCHLORIDE 5 MILLIGRAM(S): 30 TABLET ORAL at 21:09

## 2025-03-22 RX ADMIN — MIDODRINE HYDROCHLORIDE 15 MILLIGRAM(S): 5 TABLET ORAL at 11:54

## 2025-03-22 RX ADMIN — KETOROLAC TROMETHAMINE 15 MILLIGRAM(S): 30 INJECTION, SOLUTION INTRAMUSCULAR; INTRAVENOUS at 10:15

## 2025-03-22 RX ADMIN — LEVETIRACETAM 1000 MILLIGRAM(S): 10 INJECTION, SOLUTION INTRAVENOUS at 17:07

## 2025-03-22 RX ADMIN — METHOCARBAMOL 500 MILLIGRAM(S): 500 TABLET, FILM COATED ORAL at 14:28

## 2025-03-22 RX ADMIN — Medication 290 MILLIGRAM(S): at 06:16

## 2025-03-22 RX ADMIN — METHOCARBAMOL 500 MILLIGRAM(S): 500 TABLET, FILM COATED ORAL at 05:27

## 2025-03-22 RX ADMIN — Medication 1 TABLET(S): at 11:54

## 2025-03-22 RX ADMIN — ROSUVASTATIN CALCIUM 5 MILLIGRAM(S): 5 TABLET, FILM COATED ORAL at 21:09

## 2025-03-22 RX ADMIN — LIDOCAINE HYDROCHLORIDE 2 PATCH: 20 JELLY TOPICAL at 17:09

## 2025-03-22 RX ADMIN — METHOCARBAMOL 500 MILLIGRAM(S): 500 TABLET, FILM COATED ORAL at 21:09

## 2025-03-22 NOTE — PROGRESS NOTE ADULT - SUBJECTIVE AND OBJECTIVE BOX
[facility] Division of Hospital Medicine  Renny Cohen,   I'm reachable on [employer]    Patient is a 35y old  Female who presents with a chief complaint of Breakthrough seizures (21 Mar 2025 14:18)      SUBJECTIVE / OVERNIGHT EVENTS:  Patient seen and examined this morning. She denies chest pain, shortness of breath, abdominal pain, or any other acute symptoms at the time. She continues to endorse dizziness whenever she has any positional changes. Symptoms have been improving. Pending SW for outpatient set up of home PT and DME.      MEDICATIONS  (STANDING):  dextrose 5%. 1000 milliLiter(s) (100 mL/Hr) IV Continuous <Continuous>  dextrose 5%. 1000 milliLiter(s) (50 mL/Hr) IV Continuous <Continuous>  dextrose 50% Injectable 25 Gram(s) IV Push once  dextrose 50% Injectable 12.5 Gram(s) IV Push once  dextrose 50% Injectable 25 Gram(s) IV Push once  enoxaparin Injectable 40 milliGRAM(s) SubCutaneous every 24 hours  fludroCORTISONE 0.1 milliGRAM(s) Oral daily  glucagon  Injectable 1 milliGRAM(s) IntraMuscular once  insulin lispro (HumaLOG) Pump 1 Each SubCutaneous Continuous Pump  levETIRAcetam 1000 milliGRAM(s) Oral two times a day  lidocaine   4% Patch 2 Patch Transdermal every 24 hours  melatonin 5 milliGRAM(s) Oral at bedtime  methocarbamol 500 milliGRAM(s) Oral three times a day  midodrine. 15 milliGRAM(s) Oral three times a day  multivitamin 1 Tablet(s) Oral daily  polyethylene glycol 3350 17 Gram(s) Oral daily  rosuvastatin 5 milliGRAM(s) Oral at bedtime  sertraline 50 milliGRAM(s) Oral daily    MEDICATIONS  (PRN):  dextrose Oral Gel 15 Gram(s) Oral once PRN Blood Glucose LESS THAN 70 milliGRAM(s)/deciliter  ketorolac   Injectable 15 milliGRAM(s) IV Push every 6 hours PRN Moderate Pain (4 - 6)    CAPILLARY BLOOD GLUCOSE      POCT Blood Glucose.: 192 mg/dL (22 Mar 2025 08:19)  POCT Blood Glucose.: 146 mg/dL (21 Mar 2025 21:56)  POCT Blood Glucose.: 124 mg/dL (21 Mar 2025 16:25)  POCT Blood Glucose.: 112 mg/dL (21 Mar 2025 11:37)    I&O's Summary    21 Mar 2025 07:01  -  22 Mar 2025 07:00  --------------------------------------------------------  IN: 350 mL / OUT: 500 mL / NET: -150 mL        PHYSICAL EXAM:  Vital Signs Last 24 Hrs  T(C): 36.8 (22 Mar 2025 10:14), Max: 36.8 (22 Mar 2025 10:14)  T(F): 98.2 (22 Mar 2025 10:14), Max: 98.2 (22 Mar 2025 10:14)  HR: 94 (22 Mar 2025 10:14) (80 - 100)  BP: 128/82 (22 Mar 2025 10:14) (67/49 - 166/109)  BP(mean): --  RR: 18 (22 Mar 2025 10:14) (17 - 19)  SpO2: 98% (22 Mar 2025 10:14) (96% - 99%)    Parameters below as of 22 Mar 2025 10:14  Patient On (Oxygen Delivery Method): room air        CONSTITUTIONAL: NAD, well-developed, well-groomed  EYES:  EOMI, conjunctiva and sclera clear  ENMT: Moist oral mucosa  NECK: Supple, no JVD  RESPIRATORY: Normal respiratory effort; lungs are clear to auscultation bilaterally  CARDIOVASCULAR: Regular rate and rhythm, normal S1 and S2, no murmur/rub/gallop; No lower extremity edema  ABDOMEN: normoactive bowel sounds, soft, nontender to palpation, no distension   MUSCULOSKELETAL:  no clubbing or cyanosis of digits; no joint swelling or tenderness to palpation  PSYCH: A+O x3; affect appropriate, calm and cooperative  NEUROLOGY: CN 2-12 are intact and symmetric; no gross sensory deficits     LABS:                        9.5    6.68  )-----------( 262      ( 22 Mar 2025 06:53 )             29.0     03-22    139  |  103  |  17.9  ----------------------------<  202[H]  3.6   |  24.0  |  0.42[L]    Ca    8.5      22 Mar 2025 06:53    TPro  6.2[L]  /  Alb  3.5  /  TBili  <0.2[L]  /  DBili  x   /  AST  106[H]  /  ALT  155[H]  /  AlkPhos  65  03-22          Urinalysis Basic - ( 22 Mar 2025 06:53 )    Color: x / Appearance: x / SG: x / pH: x  Gluc: 202 mg/dL / Ketone: x  / Bili: x / Urobili: x   Blood: x / Protein: x / Nitrite: x   Leuk Esterase: x / RBC: x / WBC x   Sq Epi: x / Non Sq Epi: x / Bacteria: x

## 2025-03-22 NOTE — PROGRESS NOTE ADULT - ASSESSMENT
34 year old female who is being managed as an inpatient for breakthrough seizure and syncope. She has a past medical history of type I DM on insulin pump, TIA, seizure disorder. Prior to hospitalization, she was recently diagnosed with new onset CHF without a clear etiology, and she has been in and out of the hospital since November. She has not fully recovered and has been having increased difficulty walking. Patient is being managed as an inpatient for breakthrough seizure requiring vEEG and subsequently for orthostatic hypotension.    Syncope  Orthostatic hypotension likely secondary to autonomic dysfunction  Heart failure with reduced ejection fracture  - CT head negative on 3/8 and 3/10  - TTE showing LVEF is mildly decreased at 46%, global left ventricular hypokinesis  - Carotid doppler was unremarkable; Lyme serology negative  - Alert and oriented x3, answers questions and engages in conversation  - Likely due to uncontrolled DM which caused autonomic dysfunction  - Discontinued metoprolol  - Continue fall precautions  - Continue compression stockings and abdominal binder (needs appropriate size)  - Continue midodrine 15mg TID and fludrocortisone    Chronic pain  - Continue trial of toradol at this time due to ongoing hypotension  - Pain is currently controlled this morning   -pain management consult if warranted    Breakthrough seizure  Leukocytosis likely related to seizure, resolved  - s/p Keppra 1000mg IV once  - White count normalized, afebrile  - CT head negative on 3/8 and 3/10  - vEEG showing no new seizures  - Continue seizure precautions  - Continue Keppra 1000mg PO BID  - Neurology following, recs noted    Type I DM  - A1c level 7  - Patient has insulin pump  - C-peptide 0.8 with glucose 108/JOSE and islet cell ab pending  - Endocrinology following and managing    Urinary tract infection  - s/p Rocephin for total 3 days    MDD  - Continue Zoloft    HLD  - Continue Crestor      DVT/GI ppx: Lovenox  Diet: Carb consistent  Code Status: Full code  Dispo: Early next week DC to home with outpatient PT, pending acceptance and DME

## 2025-03-23 LAB
ALBUMIN SERPL ELPH-MCNC: 3.5 G/DL — SIGNIFICANT CHANGE UP (ref 3.3–5.2)
ALP SERPL-CCNC: 62 U/L — SIGNIFICANT CHANGE UP (ref 40–120)
ALT FLD-CCNC: 143 U/L — HIGH
ANION GAP SERPL CALC-SCNC: 11 MMOL/L — SIGNIFICANT CHANGE UP (ref 5–17)
AST SERPL-CCNC: 72 U/L — HIGH
BILIRUB SERPL-MCNC: <0.2 MG/DL — LOW (ref 0.4–2)
BUN SERPL-MCNC: 12.5 MG/DL — SIGNIFICANT CHANGE UP (ref 8–20)
CALCIUM SERPL-MCNC: 8.5 MG/DL — SIGNIFICANT CHANGE UP (ref 8.4–10.5)
CHLORIDE SERPL-SCNC: 104 MMOL/L — SIGNIFICANT CHANGE UP (ref 96–108)
CO2 SERPL-SCNC: 26 MMOL/L — SIGNIFICANT CHANGE UP (ref 22–29)
CREAT SERPL-MCNC: 0.42 MG/DL — LOW (ref 0.5–1.3)
EGFR: 131 ML/MIN/1.73M2 — SIGNIFICANT CHANGE UP
EGFR: 131 ML/MIN/1.73M2 — SIGNIFICANT CHANGE UP
GLUCOSE BLDC GLUCOMTR-MCNC: 114 MG/DL — HIGH (ref 70–99)
GLUCOSE BLDC GLUCOMTR-MCNC: 199 MG/DL — HIGH (ref 70–99)
GLUCOSE BLDC GLUCOMTR-MCNC: 68 MG/DL — LOW (ref 70–99)
GLUCOSE BLDC GLUCOMTR-MCNC: 77 MG/DL — SIGNIFICANT CHANGE UP (ref 70–99)
GLUCOSE SERPL-MCNC: 138 MG/DL — HIGH (ref 70–99)
HCT VFR BLD CALC: 29.4 % — LOW (ref 34.5–45)
HGB BLD-MCNC: 9.6 G/DL — LOW (ref 11.5–15.5)
MCHC RBC-ENTMCNC: 28.8 PG — SIGNIFICANT CHANGE UP (ref 27–34)
MCHC RBC-ENTMCNC: 32.7 G/DL — SIGNIFICANT CHANGE UP (ref 32–36)
MCV RBC AUTO: 88.3 FL — SIGNIFICANT CHANGE UP (ref 80–100)
NRBC # BLD AUTO: 0 K/UL — SIGNIFICANT CHANGE UP (ref 0–0)
NRBC # FLD: 0 K/UL — SIGNIFICANT CHANGE UP (ref 0–0)
NRBC BLD AUTO-RTO: 0 /100 WBCS — SIGNIFICANT CHANGE UP (ref 0–0)
PLATELET # BLD AUTO: 262 K/UL — SIGNIFICANT CHANGE UP (ref 150–400)
PMV BLD: 11.1 FL — SIGNIFICANT CHANGE UP (ref 7–13)
POTASSIUM SERPL-MCNC: 3.6 MMOL/L — SIGNIFICANT CHANGE UP (ref 3.5–5.3)
POTASSIUM SERPL-SCNC: 3.6 MMOL/L — SIGNIFICANT CHANGE UP (ref 3.5–5.3)
PROT SERPL-MCNC: 6.3 G/DL — LOW (ref 6.6–8.7)
RBC # BLD: 3.33 M/UL — LOW (ref 3.8–5.2)
RBC # FLD: 12.2 % — SIGNIFICANT CHANGE UP (ref 10.3–14.5)
SODIUM SERPL-SCNC: 141 MMOL/L — SIGNIFICANT CHANGE UP (ref 135–145)
WBC # BLD: 7.02 K/UL — SIGNIFICANT CHANGE UP (ref 3.8–10.5)
WBC # FLD AUTO: 7.02 K/UL — SIGNIFICANT CHANGE UP (ref 3.8–10.5)

## 2025-03-23 PROCEDURE — 99232 SBSQ HOSP IP/OBS MODERATE 35: CPT

## 2025-03-23 PROCEDURE — 99233 SBSQ HOSP IP/OBS HIGH 50: CPT

## 2025-03-23 RX ORDER — OXYCODONE HYDROCHLORIDE 30 MG/1
10 TABLET ORAL AT BEDTIME
Refills: 0 | Status: DISCONTINUED | OUTPATIENT
Start: 2025-03-23 | End: 2025-03-24

## 2025-03-23 RX ORDER — SODIUM CHLORIDE 9 G/1000ML
500 INJECTION, SOLUTION INTRAVENOUS
Refills: 0 | Status: DISCONTINUED | OUTPATIENT
Start: 2025-03-23 | End: 2025-03-23

## 2025-03-23 RX ORDER — OXYCODONE HYDROCHLORIDE 30 MG/1
5 TABLET ORAL ONCE
Refills: 0 | Status: DISCONTINUED | OUTPATIENT
Start: 2025-03-23 | End: 2025-03-23

## 2025-03-23 RX ORDER — SERTRALINE 100 MG/1
100 TABLET, FILM COATED ORAL DAILY
Refills: 0 | Status: DISCONTINUED | OUTPATIENT
Start: 2025-03-23 | End: 2025-03-24

## 2025-03-23 RX ORDER — OXYCODONE HYDROCHLORIDE 30 MG/1
5 TABLET ORAL EVERY 6 HOURS
Refills: 0 | Status: DISCONTINUED | OUTPATIENT
Start: 2025-03-23 | End: 2025-03-24

## 2025-03-23 RX ADMIN — OXYCODONE HYDROCHLORIDE 5 MILLIGRAM(S): 30 TABLET ORAL at 13:50

## 2025-03-23 RX ADMIN — LIDOCAINE HYDROCHLORIDE 2 PATCH: 20 JELLY TOPICAL at 19:29

## 2025-03-23 RX ADMIN — MIDODRINE HYDROCHLORIDE 15 MILLIGRAM(S): 5 TABLET ORAL at 12:51

## 2025-03-23 RX ADMIN — OXYCODONE HYDROCHLORIDE 5 MILLIGRAM(S): 30 TABLET ORAL at 05:05

## 2025-03-23 RX ADMIN — MIDODRINE HYDROCHLORIDE 15 MILLIGRAM(S): 5 TABLET ORAL at 05:06

## 2025-03-23 RX ADMIN — SERTRALINE 50 MILLIGRAM(S): 100 TABLET, FILM COATED ORAL at 12:50

## 2025-03-23 RX ADMIN — METHOCARBAMOL 500 MILLIGRAM(S): 500 TABLET, FILM COATED ORAL at 05:06

## 2025-03-23 RX ADMIN — KETOROLAC TROMETHAMINE 15 MILLIGRAM(S): 30 INJECTION, SOLUTION INTRAMUSCULAR; INTRAVENOUS at 17:34

## 2025-03-23 RX ADMIN — Medication 1 TABLET(S): at 12:50

## 2025-03-23 RX ADMIN — Medication 5 MILLIGRAM(S): at 21:56

## 2025-03-23 RX ADMIN — OXYCODONE HYDROCHLORIDE 5 MILLIGRAM(S): 30 TABLET ORAL at 12:50

## 2025-03-23 RX ADMIN — OXYCODONE HYDROCHLORIDE 10 MILLIGRAM(S): 30 TABLET ORAL at 22:53

## 2025-03-23 RX ADMIN — KETOROLAC TROMETHAMINE 15 MILLIGRAM(S): 30 INJECTION, SOLUTION INTRAMUSCULAR; INTRAVENOUS at 19:30

## 2025-03-23 RX ADMIN — LEVETIRACETAM 1000 MILLIGRAM(S): 10 INJECTION, SOLUTION INTRAVENOUS at 05:06

## 2025-03-23 RX ADMIN — LEVETIRACETAM 1000 MILLIGRAM(S): 10 INJECTION, SOLUTION INTRAVENOUS at 17:33

## 2025-03-23 RX ADMIN — ENOXAPARIN SODIUM 40 MILLIGRAM(S): 100 INJECTION SUBCUTANEOUS at 22:23

## 2025-03-23 RX ADMIN — METHOCARBAMOL 500 MILLIGRAM(S): 500 TABLET, FILM COATED ORAL at 12:50

## 2025-03-23 RX ADMIN — KETOROLAC TROMETHAMINE 15 MILLIGRAM(S): 30 INJECTION, SOLUTION INTRAMUSCULAR; INTRAVENOUS at 00:40

## 2025-03-23 RX ADMIN — LIDOCAINE HYDROCHLORIDE 2 PATCH: 20 JELLY TOPICAL at 17:33

## 2025-03-23 RX ADMIN — OXYCODONE HYDROCHLORIDE 10 MILLIGRAM(S): 30 TABLET ORAL at 22:23

## 2025-03-23 RX ADMIN — METHOCARBAMOL 500 MILLIGRAM(S): 500 TABLET, FILM COATED ORAL at 21:56

## 2025-03-23 RX ADMIN — Medication 0.1 MILLIGRAM(S): at 05:06

## 2025-03-23 NOTE — PROGRESS NOTE ADULT - ASSESSMENT
34 year old female who is being managed as an inpatient for breakthrough seizure and syncope. She has a past medical history of type I DM on insulin pump, TIA, seizure disorder. Prior to hospitalization, she was recently diagnosed with new onset CHF without a clear etiology, and she has been in and out of the hospital since November. She has not fully recovered and has been having increased difficulty walking. Patient is being managed as an inpatient for breakthrough seizure requiring vEEG and subsequently for orthostatic hypotension.    Syncope  Orthostatic hypotension likely secondary to autonomic dysfunction  Heart failure with reduced ejection fracture  - CT head negative on 3/8 and 3/10  - TTE showing LVEF is mildly decreased at 46%, global left ventricular hypokinesis  - Carotid doppler was unremarkable; Lyme serology negative  - Alert and oriented x3, answers questions and engages in conversation  - Likely due to uncontrolled DM which caused autonomic dysfunction  - Discontinued metoprolol  - Continue fall precautions  - Continue compression stockings and abdominal binder (needs appropriate size)  - Continue midodrine 15mg TID and fludrocortisone    Chronic pain  - Continue trial of toradol at this time due to ongoing hypotension  - Pain is currently controlled this morning   -pain management consult if warranted    Breakthrough seizure  Leukocytosis likely related to seizure, resolved  - s/p Keppra 1000mg IV once  - White count normalized, afebrile  - CT head negative on 3/8 and 3/10  - vEEG showing no new seizures  - Continue seizure precautions  - Continue Keppra 1000mg PO BID  - Neurology following, recs noted    Type I DM  - A1c level 7  - Patient has insulin pump  - C-peptide 0.8 with glucose 108/JOSE and islet cell ab pending  - Endocrinology following and managing    Urinary tract infection  - s/p Rocephin for total 3 days    MDD  - Continue Zoloft    HLD  Transaminitis  - LFTs improving  - Hold Crestor      DVT/GI ppx: Lovenox  Diet: Carb consistent  Code Status: Full code  Dispo: Early next week DC to home with outpatient PT, pending acceptance and DME

## 2025-03-23 NOTE — PROGRESS NOTE ADULT - TIME BILLING
Time spent reviewing the chart documentation, reviewing labs and imaging studies, evaluating the patient, discussing the plan of care with the consultants & medical team, and documenting.
Time spent reviewing patient's chart, labwork, orders, documenting care, coordinating care with consultants, and discussing patient's care with family members.
Time spent reviewing the chart documentation, reviewing labs and imaging studies, evaluating the patient, discussing the plan of care with the consultants & medical team, and documenting.
Time spent reviewing patient's chart, labwork, orders, documenting care, coordinating care with consultants, and discussing patient's care with family members.
Time spent reviewing patient's chart, labwork, orders, documenting care, coordinating care with consultants, and discussing patient's care with family members.
Time spent reviewing the chart documentation, reviewing labs and imaging studies, evaluating the patient, discussing the plan of care with the consultants & medical team, and documenting.
Time spent reviewing patient's chart, labwork, orders, documenting care, coordinating care with consultants, and discussing patient's care with family members.
Time spent reviewing the chart documentation, reviewing labs and imaging studies, evaluating the patient, discussing the plan of care with the consultants & medical team, and documenting.
Time spent reviewing the chart documentation, reviewing labs and imaging studies, evaluating the patient, discussing the plan of care with the consultants & medical team, and documenting.

## 2025-03-23 NOTE — PROGRESS NOTE ADULT - SUBJECTIVE AND OBJECTIVE BOX
[facility] Division of Hospital Medicine  Renny Cohen,   I'm reachable on [employer] [employer]    Patient is a 35y old  Female who presents with a chief complaint of Breakthrough seizures (21 Mar 2025 14:18)      SUBJECTIVE / OVERNIGHT EVENTS:  Patient seen and examined this morning. She denies chest pain, shortness of breath, abdominal pain, or any other acute symptoms at the time. She continues to endorse dizziness whenever she has any positional changes. Symptoms have been improving. Pending SW for outpatient set up of home PT and DME.      MEDICATIONS  (STANDING):  dextrose 5%. 1000 milliLiter(s) (100 mL/Hr) IV Continuous <Continuous>  dextrose 5%. 1000 milliLiter(s) (50 mL/Hr) IV Continuous <Continuous>  dextrose 50% Injectable 25 Gram(s) IV Push once  dextrose 50% Injectable 12.5 Gram(s) IV Push once  dextrose 50% Injectable 25 Gram(s) IV Push once  enoxaparin Injectable 40 milliGRAM(s) SubCutaneous every 24 hours  fludroCORTISONE 0.1 milliGRAM(s) Oral daily  glucagon  Injectable 1 milliGRAM(s) IntraMuscular once  insulin lispro (HumaLOG) Pump 1 Each SubCutaneous Continuous Pump  levETIRAcetam 1000 milliGRAM(s) Oral two times a day  lidocaine   4% Patch 2 Patch Transdermal every 24 hours  melatonin 5 milliGRAM(s) Oral at bedtime  methocarbamol 500 milliGRAM(s) Oral three times a day  midodrine. 15 milliGRAM(s) Oral three times a day  multivitamin 1 Tablet(s) Oral daily  polyethylene glycol 3350 17 Gram(s) Oral daily  rosuvastatin 5 milliGRAM(s) Oral at bedtime  sertraline 50 milliGRAM(s) Oral daily    MEDICATIONS  (PRN):  dextrose Oral Gel 15 Gram(s) Oral once PRN Blood Glucose LESS THAN 70 milliGRAM(s)/deciliter  ketorolac   Injectable 15 milliGRAM(s) IV Push every 6 hours PRN Moderate Pain (4 - 6)    CAPILLARY BLOOD GLUCOSE      POCT Blood Glucose.: 192 mg/dL (22 Mar 2025 08:19)  POCT Blood Glucose.: 146 mg/dL (21 Mar 2025 21:56)  POCT Blood Glucose.: 124 mg/dL (21 Mar 2025 16:25)  POCT Blood Glucose.: 112 mg/dL (21 Mar 2025 11:37)    I&O's Summary    21 Mar 2025 07:01  -  22 Mar 2025 07:00  --------------------------------------------------------  IN: 350 mL / OUT: 500 mL / NET: -150 mL        PHYSICAL EXAM:  Vital Signs Last 24 Hrs  T(C): 36.8 (22 Mar 2025 10:14), Max: 36.8 (22 Mar 2025 10:14)  T(F): 98.2 (22 Mar 2025 10:14), Max: 98.2 (22 Mar 2025 10:14)  HR: 94 (22 Mar 2025 10:14) (80 - 100)  BP: 128/82 (22 Mar 2025 10:14) (67/49 - 166/109)  BP(mean): --  RR: 18 (22 Mar 2025 10:14) (17 - 19)  SpO2: 98% (22 Mar 2025 10:14) (96% - 99%)    Parameters below as of 22 Mar 2025 10:14  Patient On (Oxygen Delivery Method): room air        CONSTITUTIONAL: NAD, well-developed, well-groomed  EYES:  EOMI, conjunctiva and sclera clear  ENMT: Moist oral mucosa  NECK: Supple, no JVD  RESPIRATORY: Normal respiratory effort; lungs are clear to auscultation bilaterally  CARDIOVASCULAR: Regular rate and rhythm, normal S1 and S2, no murmur/rub/gallop; No lower extremity edema  ABDOMEN: normoactive bowel sounds, soft, nontender to palpation, no distension   MUSCULOSKELETAL:  no clubbing or cyanosis of digits; no joint swelling or tenderness to palpation  PSYCH: A+O x3; affect appropriate, calm and cooperative  NEUROLOGY: CN 2-12 are intact and symmetric; no gross sensory deficits     LABS:                        9.5    6.68  )-----------( 262      ( 22 Mar 2025 06:53 )             29.0     03-22    139  |  103  |  17.9  ----------------------------<  202[H]  3.6   |  24.0  |  0.42[L]    Ca    8.5      22 Mar 2025 06:53    TPro  6.2[L]  /  Alb  3.5  /  TBili  <0.2[L]  /  DBili  x   /  AST  106[H]  /  ALT  155[H]  /  AlkPhos  65  03-22          Urinalysis Basic - ( 22 Mar 2025 06:53 )    Color: x / Appearance: x / SG: x / pH: x  Gluc: 202 mg/dL / Ketone: x  / Bili: x / Urobili: x   Blood: x / Protein: x / Nitrite: x   Leuk Esterase: x / RBC: x / WBC x   Sq Epi: x / Non Sq Epi: x / Bacteria: x

## 2025-03-23 NOTE — PROGRESS NOTE ADULT - SUBJECTIVE AND OBJECTIVE BOX
Follow up: T1DM on insulin pump  Interval Hx/Events: mild hyperglycemic yesterday possibly due to diet    MEDICATIONS  (STANDING):  dextrose 5%. 1000 milliLiter(s) (50 mL/Hr) IV Continuous <Continuous>  dextrose 5%. 1000 milliLiter(s) (100 mL/Hr) IV Continuous <Continuous>  dextrose 50% Injectable 25 Gram(s) IV Push once  dextrose 50% Injectable 12.5 Gram(s) IV Push once  dextrose 50% Injectable 25 Gram(s) IV Push once  enoxaparin Injectable 40 milliGRAM(s) SubCutaneous every 24 hours  fludroCORTISONE 0.1 milliGRAM(s) Oral daily  glucagon  Injectable 1 milliGRAM(s) IntraMuscular once  insulin lispro (HumaLOG) Pump 1 Each SubCutaneous Continuous Pump  levETIRAcetam 1000 milliGRAM(s) Oral two times a day  lidocaine   4% Patch 2 Patch Transdermal every 24 hours  melatonin 5 milliGRAM(s) Oral at bedtime  methocarbamol 500 milliGRAM(s) Oral three times a day  midodrine. 15 milliGRAM(s) Oral three times a day  multivitamin 1 Tablet(s) Oral daily  polyethylene glycol 3350 17 Gram(s) Oral daily  sertraline 100 milliGRAM(s) Oral daily    MEDICATIONS  (PRN):  dextrose Oral Gel 15 Gram(s) Oral once PRN Blood Glucose LESS THAN 70 milliGRAM(s)/deciliter  ketorolac   Injectable 15 milliGRAM(s) IV Push every 6 hours PRN Moderate Pain (4 - 6)  oxyCODONE    IR 5 milliGRAM(s) Oral every 6 hours PRN Moderate Pain (4 - 6)  oxyCODONE    IR 10 milliGRAM(s) Oral at bedtime PRN Severe Pain (7 - 10)      ALLERGIES: No Known Allergies    Vital Signs Last 24 Hrs  T(C): 36.6 (23 Mar 2025 11:04), Max: 36.8 (22 Mar 2025 20:00)  T(F): 97.9 (23 Mar 2025 11:04), Max: 98.2 (22 Mar 2025 20:00)  HR: 94 (23 Mar 2025 11:04) (91 - 99)  BP: 151/94 (23 Mar 2025 11:04) (120/75 - 151/94)  BP(mean): --  RR: 18 (23 Mar 2025 11:04) (18 - 19)  SpO2: 97% (23 Mar 2025 11:04) (97% - 99%)    Parameters below as of 23 Mar 2025 11:04  Patient On (Oxygen Delivery Method): room air    Physical Exam:  General: No apparent distress  Respiratory: Lungs clear bilaterally  Cardiac: +S1, S2, no m/r/g  GI: +BS, soft, non tender, non distended  Extremities: No peripheral edema  Neuro: A+O X3      LABS:                        9.6    7.02  )-----------( 262      ( 23 Mar 2025 05:10 )             29.4     03-23    141  |  104  |  12.5  ----------------------------<  138[H]  3.6   |  26.0  |  0.42[L]    Ca    8.5      23 Mar 2025 05:10    TPro  6.3[L]  /  Alb  3.5  /  TBili  <0.2[L]  /  DBili  x   /  AST  72[H]  /  ALT  143[H]  /  AlkPhos  62  03-23    LIVER FUNCTIONS - ( 23 Mar 2025 05:10 )  Alb: 3.5 g/dL / Pro: 6.3 g/dL / ALK PHOS: 62 U/L / ALT: 143 U/L / AST: 72 U/L / GGT: x             A1C with Estimated Average Glucose Result: 7.0 % (03-09-25 @ 04:54)      CAPILLARY BLOOD GLUCOSE  POCT Blood Glucose.: 114 mg/dL (23 Mar 2025 12:17)  POCT Blood Glucose.: 219 mg/dL (22 Mar 2025 21:12)  POCT Blood Glucose.: 236 mg/dL (22 Mar 2025 16:42)  POCT Blood Glucose.: 117 mg/dL (22 Mar 2025 11:53)  POCT Blood Glucose.: 192 mg/dL (22 Mar 2025 08:19)  POCT Blood Glucose.: 146 mg/dL (21 Mar 2025 21:56)  POCT Blood Glucose.: 124 mg/dL (21 Mar 2025 16:25)      Thyroid Stimulating Hormone, Serum: 1.49 uIU/mL [0.27 - 4.20] (03-19-25)  Thyroid Stimulating Hormone, Serum: 0.87 uIU/mL [0.27 - 4.20] (03-19-25)  Free Thyroxine, Serum: 1.1 ng/dL [0.9 - 1.7] (03-19-25)  Triiodothyronine, Total (T3 Total): 83 ng/dL [80 - 200] (03-08-25)  T4, Serum: 5.5 ug/dL [4.5 - 12.0] (03-08-25)  Free Thyroxine, Serum: 1.2 ng/dL [0.9 - 1.7] (03-08-25)  Thyroid Stimulating Hormone, Serum: 1.11 uIU/mL [0.27 - 4.20] (03-08-25)

## 2025-03-23 NOTE — PROGRESS NOTE ADULT - NUTRITIONAL ASSESSMENT
This patient has been assessed with a concern for Malnutrition and has been determined to have a diagnosis/diagnoses of Severe protein-calorie malnutrition and Underweight (BMI < 19).    This patient is being managed with:   Diet Regular-  Consistent Carbohydrate {Evening Snack} (CSTCHOSN)  Supplement Feeding Modality:  Oral  Glucerna Shake Cans or Servings Per Day:  1       Frequency:  Two Times a day  Entered: Mar 10 2025  9:24AM    The following pending diet order is being considered for treatment of Severe protein-calorie malnutrition and Underweight (BMI < 19):  Diet Consistent Carbohydrate w/Evening Snack-  Supplement Feeding Modality:  Oral  Ensure Max Cans or Servings Per Day:  1       Frequency:  Two Times a day  Entered: Mar 20 2025  4:49PM  
This patient has been assessed with a concern for Malnutrition and has been determined to have a diagnosis/diagnoses of Severe protein-calorie malnutrition and Underweight (BMI < 19).    This patient is being managed with:   Diet Regular-  Consistent Carbohydrate {Evening Snack} (CSTCHOSN)  Supplement Feeding Modality:  Oral  Glucerna Shake Cans or Servings Per Day:  1       Frequency:  Two Times a day  Entered: Mar 10 2025  9:24AM  
This patient has been assessed with a concern for Malnutrition and has been determined to have a diagnosis/diagnoses of Severe protein-calorie malnutrition and Underweight (BMI < 19).    This patient is being managed with:   Diet Consistent Carbohydrate/No Snacks-  Entered: Mar  8 2025  7:56PM  
This patient has been assessed with a concern for Malnutrition and has been determined to have a diagnosis/diagnoses of Severe protein-calorie malnutrition and Underweight (BMI < 19).    This patient is being managed with:   Diet Regular-  Consistent Carbohydrate {Evening Snack} (CSTCHOSN)  Supplement Feeding Modality:  Oral  Glucerna Shake Cans or Servings Per Day:  1       Frequency:  Two Times a day  Entered: Mar 10 2025  9:24AM  
This patient has been assessed with a concern for Malnutrition and has been determined to have a diagnosis/diagnoses of Severe protein-calorie malnutrition and Underweight (BMI < 19).    This patient is being managed with:   Diet Regular-  Consistent Carbohydrate {Evening Snack} (CSTCHOSN)  Supplement Feeding Modality:  Oral  Glucerna Shake Cans or Servings Per Day:  1       Frequency:  Two Times a day  Entered: Mar 10 2025  9:24AM    The following pending diet order is being considered for treatment of Severe protein-calorie malnutrition and Underweight (BMI < 19):  Diet Consistent Carbohydrate w/Evening Snack-  Supplement Feeding Modality:  Oral  Ensure Max Cans or Servings Per Day:  1       Frequency:  Two Times a day  Entered: Mar 20 2025  4:49PM  
This patient has been assessed with a concern for Malnutrition and has been determined to have a diagnosis/diagnoses of Severe protein-calorie malnutrition and Underweight (BMI < 19).    This patient is being managed with:   Diet Regular-  Consistent Carbohydrate {Evening Snack} (CSTCHOSN)  Supplement Feeding Modality:  Oral  Glucerna Shake Cans or Servings Per Day:  1       Frequency:  Two Times a day  Entered: Mar 10 2025  9:24AM    The following pending diet order is being considered for treatment of Severe protein-calorie malnutrition and Underweight (BMI < 19):  Diet Consistent Carbohydrate w/Evening Snack-  Supplement Feeding Modality:  Oral  Ensure Max Cans or Servings Per Day:  1       Frequency:  Two Times a day  Entered: Mar 20 2025  4:49PM  
This patient has been assessed with a concern for Malnutrition and has been determined to have a diagnosis/diagnoses of Severe protein-calorie malnutrition and Underweight (BMI < 19).    This patient is being managed with:   Diet Regular-  Consistent Carbohydrate {Evening Snack} (CSTCHOSN)  Supplement Feeding Modality:  Oral  Glucerna Shake Cans or Servings Per Day:  1       Frequency:  Two Times a day  Entered: Mar 10 2025  9:24AM  
[ ]  Catheterization:  11/2024 at [facility]  Coronary Findings Diagnostic Dominance: Right  Left Main: The vessel exhibits minimal luminal irregularities.  Left Anterior Descending: The vessel exhibits minimal luminal irregularities.  Left Circumflex: The vessel exhibits minimal luminal irregularities.  Right Coronary Artery: The vessel exhibits minimal luminal irregularities.  Intervention  No interventions have been documented.
This patient has been assessed with a concern for Malnutrition and has been determined to have a diagnosis/diagnoses of Severe protein-calorie malnutrition and Underweight (BMI < 19).    This patient is being managed with:   Diet Regular-  Consistent Carbohydrate {Evening Snack} (CSTCHOSN)  Supplement Feeding Modality:  Oral  Glucerna Shake Cans or Servings Per Day:  1       Frequency:  Two Times a day  Entered: Mar 10 2025  9:24AM

## 2025-03-23 NOTE — PROGRESS NOTE ADULT - ASSESSMENT
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 Admitted for seizures. Of note, patient missed about 1-2 weeks of keppra due to meds not being renewed and only restarted on keppra recently. Consult for diabetes mgmt, a1c 7.    1. Moderately controlled T1DM - mild hyperglycemia yesterday likely diet related but now improved today  - May continue to use insulin pump, orders in place  - Please check fingersticks before meals and at bedtime while on insulin pump  - Has dexcom sensor in place  - If patient is off insulin pump for any reason, start lantus 4 units with low dose sliding scale with meals  - C-peptide 0.8 with glucose 108/JOSE and islet cell ab are negative    2. Seizures  - On Keppra   - Care per neuro/primary team    3. Orthostatic hypotension likely secondary to autonomic dysfunction  - Heart failure with reduced ejection fracture  - Symptomatic hypotension, on midodrine

## 2025-03-24 VITALS
RESPIRATION RATE: 18 BRPM | OXYGEN SATURATION: 99 % | HEART RATE: 105 BPM | TEMPERATURE: 98 F | SYSTOLIC BLOOD PRESSURE: 109 MMHG | WEIGHT: 111.55 LBS | DIASTOLIC BLOOD PRESSURE: 75 MMHG

## 2025-03-24 LAB
GLUCOSE BLDC GLUCOMTR-MCNC: 137 MG/DL — HIGH (ref 70–99)
GLUCOSE BLDC GLUCOMTR-MCNC: 162 MG/DL — HIGH (ref 70–99)

## 2025-03-24 PROCEDURE — 84480 ASSAY TRIIODOTHYRONINE (T3): CPT

## 2025-03-24 PROCEDURE — 96374 THER/PROPH/DIAG INJ IV PUSH: CPT

## 2025-03-24 PROCEDURE — 86341 ISLET CELL ANTIBODY: CPT

## 2025-03-24 PROCEDURE — 84439 ASSAY OF FREE THYROXINE: CPT

## 2025-03-24 PROCEDURE — 93880 EXTRACRANIAL BILAT STUDY: CPT

## 2025-03-24 PROCEDURE — 85652 RBC SED RATE AUTOMATED: CPT

## 2025-03-24 PROCEDURE — 83735 ASSAY OF MAGNESIUM: CPT

## 2025-03-24 PROCEDURE — 81001 URINALYSIS AUTO W/SCOPE: CPT

## 2025-03-24 PROCEDURE — 87186 SC STD MICRODIL/AGAR DIL: CPT

## 2025-03-24 PROCEDURE — 84436 ASSAY OF TOTAL THYROXINE: CPT

## 2025-03-24 PROCEDURE — 82533 TOTAL CORTISOL: CPT

## 2025-03-24 PROCEDURE — 93306 TTE W/DOPPLER COMPLETE: CPT

## 2025-03-24 PROCEDURE — 0225U NFCT DS DNA&RNA 21 SARSCOV2: CPT

## 2025-03-24 PROCEDURE — 87086 URINE CULTURE/COLONY COUNT: CPT

## 2025-03-24 PROCEDURE — 95711 VEEG 2-12 HR UNMONITORED: CPT

## 2025-03-24 PROCEDURE — 96375 TX/PRO/DX INJ NEW DRUG ADDON: CPT

## 2025-03-24 PROCEDURE — 86780 TREPONEMA PALLIDUM: CPT

## 2025-03-24 PROCEDURE — 80307 DRUG TEST PRSMV CHEM ANLYZR: CPT

## 2025-03-24 PROCEDURE — 82962 GLUCOSE BLOOD TEST: CPT

## 2025-03-24 PROCEDURE — 84702 CHORIONIC GONADOTROPIN TEST: CPT

## 2025-03-24 PROCEDURE — 85025 COMPLETE CBC W/AUTO DIFF WBC: CPT

## 2025-03-24 PROCEDURE — 36415 COLL VENOUS BLD VENIPUNCTURE: CPT

## 2025-03-24 PROCEDURE — 82550 ASSAY OF CK (CPK): CPT

## 2025-03-24 PROCEDURE — 83036 HEMOGLOBIN GLYCOSYLATED A1C: CPT

## 2025-03-24 PROCEDURE — 82607 VITAMIN B-12: CPT

## 2025-03-24 PROCEDURE — 83880 ASSAY OF NATRIURETIC PEPTIDE: CPT

## 2025-03-24 PROCEDURE — 70450 CT HEAD/BRAIN W/O DYE: CPT | Mod: MC

## 2025-03-24 PROCEDURE — 93005 ELECTROCARDIOGRAM TRACING: CPT

## 2025-03-24 PROCEDURE — 86140 C-REACTIVE PROTEIN: CPT

## 2025-03-24 PROCEDURE — 86618 LYME DISEASE ANTIBODY: CPT

## 2025-03-24 PROCEDURE — 99239 HOSP IP/OBS DSCHRG MGMT >30: CPT

## 2025-03-24 PROCEDURE — 95714 VEEG EA 12-26 HR UNMNTR: CPT

## 2025-03-24 PROCEDURE — 99233 SBSQ HOSP IP/OBS HIGH 50: CPT

## 2025-03-24 PROCEDURE — 93356 MYOCRD STRAIN IMG SPCKL TRCK: CPT

## 2025-03-24 PROCEDURE — 84681 ASSAY OF C-PEPTIDE: CPT

## 2025-03-24 PROCEDURE — 87077 CULTURE AEROBIC IDENTIFY: CPT

## 2025-03-24 PROCEDURE — 97167 OT EVAL HIGH COMPLEX 60 MIN: CPT

## 2025-03-24 PROCEDURE — 84100 ASSAY OF PHOSPHORUS: CPT

## 2025-03-24 PROCEDURE — 99285 EMERGENCY DEPT VISIT HI MDM: CPT

## 2025-03-24 PROCEDURE — 80053 COMPREHEN METABOLIC PANEL: CPT

## 2025-03-24 PROCEDURE — 71045 X-RAY EXAM CHEST 1 VIEW: CPT

## 2025-03-24 PROCEDURE — 97163 PT EVAL HIGH COMPLEX 45 MIN: CPT

## 2025-03-24 PROCEDURE — 84443 ASSAY THYROID STIM HORMONE: CPT

## 2025-03-24 PROCEDURE — 85027 COMPLETE CBC AUTOMATED: CPT

## 2025-03-24 PROCEDURE — 80048 BASIC METABOLIC PNL TOTAL CA: CPT

## 2025-03-24 PROCEDURE — 95700 EEG CONT REC W/VID EEG TECH: CPT

## 2025-03-24 RX ORDER — LEVETIRACETAM 10 MG/ML
1 INJECTION, SOLUTION INTRAVENOUS
Qty: 0 | Refills: 0 | DISCHARGE
Start: 2025-03-24

## 2025-03-24 RX ORDER — ROSUVASTATIN CALCIUM 5 MG/1
1 TABLET, FILM COATED ORAL
Refills: 0 | DISCHARGE

## 2025-03-24 RX ORDER — LEVETIRACETAM 10 MG/ML
1 INJECTION, SOLUTION INTRAVENOUS
Qty: 60 | Refills: 0
Start: 2025-03-24 | End: 2025-04-22

## 2025-03-24 RX ORDER — OXYCODONE HYDROCHLORIDE 30 MG/1
1 TABLET ORAL
Qty: 12 | Refills: 0
Start: 2025-03-24 | End: 2025-03-26

## 2025-03-24 RX ORDER — B1/B2/B3/B5/B6/B12/VIT C/FOLIC 500-0.5 MG
1 TABLET ORAL
Qty: 30 | Refills: 0
Start: 2025-03-24 | End: 2025-04-22

## 2025-03-24 RX ORDER — SPIRONOLACTONE 25 MG
1 TABLET ORAL
Refills: 0 | DISCHARGE

## 2025-03-24 RX ORDER — MELATONIN 5 MG
1 TABLET ORAL
Qty: 30 | Refills: 0
Start: 2025-03-24 | End: 2025-04-22

## 2025-03-24 RX ORDER — FLUDROCORTISONE ACETATE 0.1 MG
1 TABLET ORAL
Qty: 0 | Refills: 0 | DISCHARGE
Start: 2025-03-24

## 2025-03-24 RX ORDER — MAGNESIUM OXIDE 400 MG
1 TABLET ORAL
Refills: 0 | DISCHARGE

## 2025-03-24 RX ORDER — SERTRALINE 100 MG/1
1 TABLET, FILM COATED ORAL
Qty: 30 | Refills: 0
Start: 2025-03-24 | End: 2025-04-22

## 2025-03-24 RX ORDER — POLYETHYLENE GLYCOL 3350 17 G/17G
17 POWDER, FOR SOLUTION ORAL
Qty: 1 | Refills: 0
Start: 2025-03-24

## 2025-03-24 RX ORDER — LEVETIRACETAM 10 MG/ML
1 INJECTION, SOLUTION INTRAVENOUS
Refills: 0 | DISCHARGE

## 2025-03-24 RX ORDER — B1/B2/B3/B5/B6/B12/VIT C/FOLIC 500-0.5 MG
1 TABLET ORAL
Qty: 0 | Refills: 0 | DISCHARGE
Start: 2025-03-24

## 2025-03-24 RX ORDER — METHOCARBAMOL 500 MG/1
1 TABLET, FILM COATED ORAL
Qty: 90 | Refills: 0
Start: 2025-03-24 | End: 2025-04-22

## 2025-03-24 RX ORDER — MIDODRINE HYDROCHLORIDE 5 MG/1
3 TABLET ORAL
Qty: 0 | Refills: 0 | DISCHARGE
Start: 2025-03-24

## 2025-03-24 RX ORDER — MIDODRINE HYDROCHLORIDE 5 MG/1
1 TABLET ORAL
Refills: 0 | DISCHARGE

## 2025-03-24 RX ORDER — MELATONIN 5 MG
1 TABLET ORAL
Qty: 0 | Refills: 0 | DISCHARGE
Start: 2025-03-24

## 2025-03-24 RX ORDER — MIDODRINE HYDROCHLORIDE 5 MG/1
3 TABLET ORAL
Qty: 270 | Refills: 0
Start: 2025-03-24 | End: 2025-04-22

## 2025-03-24 RX ORDER — FLUDROCORTISONE ACETATE 0.1 MG
1 TABLET ORAL
Qty: 30 | Refills: 0
Start: 2025-03-24 | End: 2025-04-22

## 2025-03-24 RX ORDER — METHOCARBAMOL 500 MG/1
1 TABLET, FILM COATED ORAL
Refills: 0 | DISCHARGE

## 2025-03-24 RX ORDER — SERTRALINE 100 MG/1
1 TABLET, FILM COATED ORAL
Qty: 0 | Refills: 0 | DISCHARGE
Start: 2025-03-24

## 2025-03-24 RX ORDER — SENNA 187 MG
2 TABLET ORAL
Qty: 60 | Refills: 0
Start: 2025-03-24 | End: 2025-04-22

## 2025-03-24 RX ORDER — METOPROLOL SUCCINATE 50 MG/1
1 TABLET, EXTENDED RELEASE ORAL
Refills: 0 | DISCHARGE

## 2025-03-24 RX ORDER — SERTRALINE 100 MG/1
1 TABLET, FILM COATED ORAL
Refills: 0 | DISCHARGE

## 2025-03-24 RX ADMIN — KETOROLAC TROMETHAMINE 15 MILLIGRAM(S): 30 INJECTION, SOLUTION INTRAMUSCULAR; INTRAVENOUS at 05:38

## 2025-03-24 RX ADMIN — KETOROLAC TROMETHAMINE 15 MILLIGRAM(S): 30 INJECTION, SOLUTION INTRAMUSCULAR; INTRAVENOUS at 05:08

## 2025-03-24 RX ADMIN — MIDODRINE HYDROCHLORIDE 15 MILLIGRAM(S): 5 TABLET ORAL at 08:58

## 2025-03-24 RX ADMIN — METHOCARBAMOL 500 MILLIGRAM(S): 500 TABLET, FILM COATED ORAL at 12:25

## 2025-03-24 RX ADMIN — Medication 1 TABLET(S): at 12:25

## 2025-03-24 RX ADMIN — METHOCARBAMOL 500 MILLIGRAM(S): 500 TABLET, FILM COATED ORAL at 05:08

## 2025-03-24 RX ADMIN — LEVETIRACETAM 1000 MILLIGRAM(S): 10 INJECTION, SOLUTION INTRAVENOUS at 05:04

## 2025-03-24 RX ADMIN — SERTRALINE 100 MILLIGRAM(S): 100 TABLET, FILM COATED ORAL at 12:26

## 2025-03-24 RX ADMIN — Medication 0.1 MILLIGRAM(S): at 05:08

## 2025-03-24 NOTE — PROGRESS NOTE ADULT - ASSESSMENT
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 Admitted for seizures. Of note, patient missed about 1-2 weeks of keppra due to meds not being renewed and only restarted on keppra recently. Consult for diabetes mgmt, a1c 7.    1. Moderately controlled T1DM  - May continue to use insulin pump, orders in place  - Please check fingersticks before meals and at bedtime while on insulin pump  - Has dexcom sensor in place  - If patient is off insulin pump for any reason, start lantus 4 units with low dose sliding scale with meals  - C-peptide 0.8 with glucose 108/JOSE and islet cell ab are negative    2. Seizures  - On Keppra   - Care per neuro/primary team    3. Orthostatic hypotension likely secondary to autonomic dysfunction  - Heart failure with reduced ejection fracture  - Symptomatic hypotension, on midodrine

## 2025-03-24 NOTE — PROGRESS NOTE ADULT - NS ATTEND AMEND GEN_ALL_CORE FT
The case was reviewed and discussed with NP in details. Agree with above assessment and recommendations.

## 2025-03-24 NOTE — PROGRESS NOTE ADULT - PROVIDER SPECIALTY LIST ADULT
Endocrinology
Hospitalist
Neurology
Physiatry
Cardiology
Endocrinology
Endocrinology
Hospitalist
Physiatry
Endocrinology
Hospitalist
Endocrinology
Hospitalist

## 2025-03-24 NOTE — PROGRESS NOTE ADULT - REASON FOR ADMISSION
Breakthrough seizures

## 2025-03-24 NOTE — PROGRESS NOTE ADULT - SUBJECTIVE AND OBJECTIVE BOX
CC: Patient being seen for rehabilitation follow up.  Patient continues to have BP challenges.   Continues to have limited ability with PT given her symptoms.   Patient understood that given her limited functional tolerance, could not be expected to participate in a 3hr a day program.     FUNCTIONAL PROGRESS  3/22 PT  Bed Mobility  Bed Mobility Training Sit-to-Supine: supervision  Bed Mobility Training Supine-to-Sit: supervision    Sit-Stand Transfer Training  Transfer Training Sit-to-Stand Transfer: supervision  Transfer Training Stand-to-Sit Transfer: supervision    Gait Training  Gait Trainin'x4 RW supervision multiple trials at bedside due to dizziness  Gait Analysis: decreased gait velocity, supervision for safety    3/21 PT  Sit-Stand Transfer Training  Sit-to-Stand Transfer Training Charges: x 4 reps   Transfer Training Sit-to-Stand Transfer: supervsion;  rolling walker  Transfer Training Stand-to-Sit Transfer: supervsion;  rolling walker  Sit-to-Stand Transfer Training Transfer Safety Analysis: impaired balance    Gait Training  Gait Training: supervsion;  stand-by assist;  chair follow;  rolling walker;  5 feet x 3 reps with standing rest breaks and chair follow.   Gait Analysis: 3-point gait   impaired balance;  feeling lightheaded. BP 77/53 in standing. Orthostatics documented in flow sheet.     VITALS  T(C): 36.6 (25 @ 04:28), Max: 36.8 (25 @ 17:12)  HR: 87 (25 @ 04:28) (70 - 95)  BP: 132/80 (25 @ 04:28) (132/80 - 166/91)  RR: 18 (25 @ 04:28) (18 - 18)  SpO2: 100% (25 @ 04:28) (97% - 100%)  Wt(kg): --    MEDICATIONS   dextrose 5%. 1000 milliLiter(s) <Continuous>  dextrose 5%. 1000 milliLiter(s) <Continuous>  dextrose 50% Injectable 25 Gram(s) once  dextrose 50% Injectable 12.5 Gram(s) once  dextrose 50% Injectable 25 Gram(s) once  dextrose Oral Gel 15 Gram(s) once PRN  enoxaparin Injectable 40 milliGRAM(s) every 24 hours  fludroCORTISONE 0.1 milliGRAM(s) daily  glucagon  Injectable 1 milliGRAM(s) once  insulin lispro (HumaLOG) Pump 1 Each Continuous Pump  ketorolac   Injectable 15 milliGRAM(s) every 6 hours PRN  levETIRAcetam 1000 milliGRAM(s) two times a day  lidocaine   4% Patch 2 Patch every 24 hours  melatonin 5 milliGRAM(s) at bedtime  methocarbamol 500 milliGRAM(s) three times a day  midodrine. 15 milliGRAM(s) three times a day  multivitamin 1 Tablet(s) daily  oxyCODONE    IR 5 milliGRAM(s) every 6 hours PRN  oxyCODONE    IR 10 milliGRAM(s) at bedtime PRN  polyethylene glycol 3350 17 Gram(s) daily  sertraline 100 milliGRAM(s) daily      RECENT LABS/IMAGING  - Reviewed Today                        9.6    7.02  )-----------( 262      ( 23 Mar 2025 05:10 )             29.4         141  |  104  |  12.5  ----------------------------<  138[H]  3.6   |  26.0  |  0.42[L]    Ca    8.5      23 Mar 2025 05:10    TPro  6.3[L]  /  Alb  3.5  /  TBili  <0.2[L]  /  DBili  x   /  AST  72[H]  /  ALT  143[H]  /  AlkPhos  62        Urinalysis Basic - ( 23 Mar 2025 05:10 )    Color: x / Appearance: x / SG: x / pH: x  Gluc: 138 mg/dL / Ketone: x  / Bili: x / Urobili: x   Blood: x / Protein: x / Nitrite: x   Leuk Esterase: x / RBC: x / WBC x   Sq Epi: x / Non Sq Epi: x / Bacteria: x              HEAD CT 3/10 -  No acute intracranial hemorrhage, mass effect, or shift of the midline structures. Unchanged nonspecific adenoidal hypertrophy.    TTE 3/10 -  1. Left atrium is normal in size.   2. Left ventricular systolic function is mildly decreased with an ejection fraction of 46 % by Grey's method of disks. Global left ventricular hypokinesis.   3. Left ventricular global longitudinal strain is -11.0 % is abnormal (> -16%).   4. There is mild (grade 1) left ventricular diastolic dysfunction.   5. The right atrium is normal in size.   6. Normal right ventricular cavity size and normal right ventricular systolic function.   7. No significant valvular disease.   8. No pericardial effusion seen.   9. 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      Consider Cardiac CT angiography to assess for anomalous coronary artery if clinically indicated.          ----------------------------------------------------------------------------------------  PHYSICAL EXAM  Constitutional - NAD, Appears Comfortable    Neuro - AAOx4  Psychiatric - Stable  ----------------------------------------------------------------------------------------  ASSESSMENT/PLAN  35yFemale with functional deficits after developing breakthrough seizures  Breakthrough Seizures - Keppra  Syncope/Orthostatic Hypotension/HTN/?POTS - Midodrine, Florinef   DM1 - Humalog Pump, ISS  CAD - Crestor  Mood D/O - Zoloft  Sleep - Melatonin   Abnormal LFT's - Increased  Pain - Tylenol, RECOMMEND DC Oxycodone/Robaxin, CONSIDER ADDING NEURONTIN 300mg Q8, CONSIDER PAIN MANAGEMENT, Lidoderm  DVT PPX - SCDs, Lovenox  Rehab/Impaired mobility and function - Patient continues to require hospitalization for the above diagnoses and ongoing active management of comorbid complications that are substantially posing a threat to bodily function, functional ability and quality of life.     RECOMMEND - OOB daily   Patient may be more appropriate for DC HOME.   Would not be able to tolerate a 3hr/day program.   CC: Patient being seen for rehabilitation follow up.  Patient continues to have BP challenges.   Continues to have limited ability with PT given her symptoms.   Patient understood that given her limited functional tolerance, could not be expected to participate in a 3hr a day program.     FUNCTIONAL PROGRESS  3/22 PT  Bed Mobility  Bed Mobility Training Sit-to-Supine: supervision  Bed Mobility Training Supine-to-Sit: supervision    Sit-Stand Transfer Training  Transfer Training Sit-to-Stand Transfer: supervision  Transfer Training Stand-to-Sit Transfer: supervision    Gait Training  Gait Trainin'x4 RW supervision multiple trials at bedside due to dizziness  Gait Analysis: decreased gait velocity, supervision for safety    3/21 PT  Sit-Stand Transfer Training  Sit-to-Stand Transfer Training Charges: x 4 reps   Transfer Training Sit-to-Stand Transfer: supervsion;  rolling walker  Transfer Training Stand-to-Sit Transfer: supervsion;  rolling walker  Sit-to-Stand Transfer Training Transfer Safety Analysis: impaired balance    Gait Training  Gait Training: supervsion;  stand-by assist;  chair follow;  rolling walker;  5 feet x 3 reps with standing rest breaks and chair follow.   Gait Analysis: 3-point gait   impaired balance;  feeling lightheaded. BP 77/53 in standing. Orthostatics documented in flow sheet.     VITALS  T(C): 36.6 (25 @ 04:28), Max: 36.8 (25 @ 17:12)  HR: 87 (25 @ 04:28) (70 - 95)  BP: 132/80 (25 @ 04:28) (132/80 - 166/91)  RR: 18 (25 @ 04:28) (18 - 18)  SpO2: 100% (25 @ 04:28) (97% - 100%)  Wt(kg): --    MEDICATIONS   dextrose 5%. 1000 milliLiter(s) <Continuous>  dextrose 5%. 1000 milliLiter(s) <Continuous>  dextrose 50% Injectable 25 Gram(s) once  dextrose 50% Injectable 12.5 Gram(s) once  dextrose 50% Injectable 25 Gram(s) once  dextrose Oral Gel 15 Gram(s) once PRN  enoxaparin Injectable 40 milliGRAM(s) every 24 hours  fludroCORTISONE 0.1 milliGRAM(s) daily  glucagon  Injectable 1 milliGRAM(s) once  insulin lispro (HumaLOG) Pump 1 Each Continuous Pump  ketorolac   Injectable 15 milliGRAM(s) every 6 hours PRN  levETIRAcetam 1000 milliGRAM(s) two times a day  lidocaine   4% Patch 2 Patch every 24 hours  melatonin 5 milliGRAM(s) at bedtime  methocarbamol 500 milliGRAM(s) three times a day  midodrine. 15 milliGRAM(s) three times a day  multivitamin 1 Tablet(s) daily  oxyCODONE    IR 5 milliGRAM(s) every 6 hours PRN  oxyCODONE    IR 10 milliGRAM(s) at bedtime PRN  polyethylene glycol 3350 17 Gram(s) daily  sertraline 100 milliGRAM(s) daily      RECENT LABS/IMAGING  - Reviewed Today                        9.6    7.02  )-----------( 262      ( 23 Mar 2025 05:10 )             29.4         141  |  104  |  12.5  ----------------------------<  138[H]  3.6   |  26.0  |  0.42[L]    Ca    8.5      23 Mar 2025 05:10    TPro  6.3[L]  /  Alb  3.5  /  TBili  <0.2[L]  /  DBili  x   /  AST  72[H]  /  ALT  143[H]  /  AlkPhos  62        Urinalysis Basic - ( 23 Mar 2025 05:10 )    Color: x / Appearance: x / SG: x / pH: x  Gluc: 138 mg/dL / Ketone: x  / Bili: x / Urobili: x   Blood: x / Protein: x / Nitrite: x   Leuk Esterase: x / RBC: x / WBC x   Sq Epi: x / Non Sq Epi: x / Bacteria: x              HEAD CT 3/10 -  No acute intracranial hemorrhage, mass effect, or shift of the midline structures. Unchanged nonspecific adenoidal hypertrophy.    TTE 3/10 -  1. Left atrium is normal in size.   2. Left ventricular systolic function is mildly decreased with an ejection fraction of 46 % by Grey's method of disks. Global left ventricular hypokinesis.   3. Left ventricular global longitudinal strain is -11.0 % is abnormal (> -16%).   4. There is mild (grade 1) left ventricular diastolic dysfunction.   5. The right atrium is normal in size.   6. Normal right ventricular cavity size and normal right ventricular systolic function.   7. No significant valvular disease.   8. No pericardial effusion seen.   9. 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      Consider Cardiac CT angiography to assess for anomalous coronary artery if clinically indicated.          ----------------------------------------------------------------------------------------  PHYSICAL EXAM  Constitutional - NAD, Appears Comfortable    Neuro - AAOx4  Psychiatric - Stable  ----------------------------------------------------------------------------------------  ASSESSMENT/PLAN  35yFemale with functional deficits after developing breakthrough seizures  Breakthrough Seizures - Keppra  Syncope/Orthostatic Hypotension/HTN/?POTS - Midodrine, Florinef   DM1 - Humalog Pump, ISS  CAD - Crestor  Mood D/O - Zoloft  Sleep - Melatonin   Abnormal LFT's - Increased  Pain - Tylenol, RECOMMEND DC Oxycodone/Robaxin, CONSIDER ADDING NEURONTIN 300mg Q8, CONSIDER PAIN MANAGEMENT, Lidoderm  DVT PPX - SCDs, Lovenox  Rehab/Impaired mobility and function - Patient continues to require hospitalization for the above diagnoses and ongoing active management of comorbid complications that are substantially posing a threat to bodily function, functional ability and quality of life.     RECOMMEND - OOB daily   Patient may be more appropriate for DC HOME.   Would not be able to tolerate a 3hr/day program.  Patient would like PMR to continue to follow for when and if her ability improves and may become an AR candidate.

## 2025-03-24 NOTE — PROGRESS NOTE ADULT - SUBJECTIVE AND OBJECTIVE BOX
INTERVAL EVENTS:  Follow up diabetes management. On insulin pump, glucoses acceptable    MEDICATIONS  (STANDING):  dextrose 5%. 1000 milliLiter(s) (50 mL/Hr) IV Continuous <Continuous>  dextrose 5%. 1000 milliLiter(s) (100 mL/Hr) IV Continuous <Continuous>  dextrose 50% Injectable 25 Gram(s) IV Push once  dextrose 50% Injectable 12.5 Gram(s) IV Push once  dextrose 50% Injectable 25 Gram(s) IV Push once  enoxaparin Injectable 40 milliGRAM(s) SubCutaneous every 24 hours  fludroCORTISONE 0.1 milliGRAM(s) Oral daily  glucagon  Injectable 1 milliGRAM(s) IntraMuscular once  insulin lispro (HumaLOG) Pump 1 Each SubCutaneous Continuous Pump  levETIRAcetam 1000 milliGRAM(s) Oral two times a day  lidocaine   4% Patch 2 Patch Transdermal every 24 hours  melatonin 5 milliGRAM(s) Oral at bedtime  methocarbamol 500 milliGRAM(s) Oral three times a day  midodrine. 15 milliGRAM(s) Oral three times a day  multivitamin 1 Tablet(s) Oral daily  polyethylene glycol 3350 17 Gram(s) Oral daily  sertraline 100 milliGRAM(s) Oral daily    MEDICATIONS  (PRN):  dextrose Oral Gel 15 Gram(s) Oral once PRN Blood Glucose LESS THAN 70 milliGRAM(s)/deciliter  ketorolac   Injectable 15 milliGRAM(s) IV Push every 6 hours PRN Moderate Pain (4 - 6)  oxyCODONE    IR 5 milliGRAM(s) Oral every 6 hours PRN Moderate Pain (4 - 6)  oxyCODONE    IR 10 milliGRAM(s) Oral at bedtime PRN Severe Pain (7 - 10)    Allergies  No Known Allergies    Vital Signs Last 24 Hrs  T(C): 36.8 (24 Mar 2025 10:27), Max: 36.8 (23 Mar 2025 17:12)  T(F): 98.3 (24 Mar 2025 10:27), Max: 98.3 (24 Mar 2025 10:27)  HR: 105 (24 Mar 2025 10:27) (70 - 107)  BP: 109/75 (24 Mar 2025 10:27) (71/44 - 166/91)  BP(mean): --  RR: 18 (24 Mar 2025 10:27) (18 - 18)  SpO2: 99% (24 Mar 2025 10:27) (97% - 100%)    Parameters below as of 24 Mar 2025 10:27  Patient On (Oxygen Delivery Method): room air    PHYSICAL EXAM:  General: No apparent distress  Respiratory: Lungs clear bilaterally  Cardiac: +S1, S2, no m/r/g  GI: +BS, soft, non tender, non distended  Extremities: No peripheral edema  Neuro: A+O X3    LABS:                        9.6    7.02  )-----------( 262      ( 23 Mar 2025 05:10 )             29.4     03-23    141  |  104  |  12.5  ----------------------------<  138[H]  3.6   |  26.0  |  0.42[L]    Ca    8.5      23 Mar 2025 05:10    TPro  6.3[L]  /  Alb  3.5  /  TBili  <0.2[L]  /  DBili  x   /  AST  72[H]  /  ALT  143[H]  /  AlkPhos  62  03-23    Urinalysis Basic - ( 23 Mar 2025 05:10 )    Color: x / Appearance: x / SG: x / pH: x  Gluc: 138 mg/dL / Ketone: x  / Bili: x / Urobili: x   Blood: x / Protein: x / Nitrite: x   Leuk Esterase: x / RBC: x / WBC x   Sq Epi: x / Non Sq Epi: x / Bacteria: x    POCT Blood Glucose.: 162 mg/dL (03-24-25 @ 11:22)  POCT Blood Glucose.: 137 mg/dL (03-24-25 @ 08:57)  POCT Blood Glucose.: 77 mg/dL (03-23-25 @ 22:10)  POCT Blood Glucose.: 68 mg/dL (03-23-25 @ 21:53)  POCT Blood Glucose.: 199 mg/dL (03-23-25 @ 17:03)    Thyroid Stimulating Hormone, Serum: 1.49 uIU/mL (03-19-25 @ 17:05)  Thyroid Stimulating Hormone, Serum: 0.87 uIU/mL (03-19-25 @ 09:11)  Free Thyroxine, Serum: 1.1 ng/dL (03-19-25 @ 09:11)  Triiodothyronine, Total (T3 Total): 83 ng/dL (03-08-25 @ 15:55)  Free Thyroxine, Serum: 1.2 ng/dL (03-08-25 @ 15:55)  Thyroid Stimulating Hormone, Serum: 1.11 uIU/mL (03-08-25 @ 15:55)

## 2025-03-25 PROBLEM — G40.909 EPILEPSY, UNSPECIFIED, NOT INTRACTABLE, WITHOUT STATUS EPILEPTICUS: Chronic | Status: ACTIVE | Noted: 2025-03-08

## 2025-03-25 PROBLEM — I95.1 ORTHOSTATIC HYPOTENSION: Chronic | Status: ACTIVE | Noted: 2025-03-08

## 2025-03-25 PROBLEM — I50.22 CHRONIC SYSTOLIC (CONGESTIVE) HEART FAILURE: Chronic | Status: ACTIVE | Noted: 2025-03-08

## 2025-06-23 ENCOUNTER — APPOINTMENT (OUTPATIENT)
Dept: NEUROLOGY | Facility: CLINIC | Age: 35
End: 2025-06-23
Payer: MEDICAID

## 2025-06-23 VITALS
BODY MASS INDEX: 29.64 KG/M2 | WEIGHT: 147 LBS | DIASTOLIC BLOOD PRESSURE: 112 MMHG | SYSTOLIC BLOOD PRESSURE: 176 MMHG | HEART RATE: 85 BPM | HEIGHT: 59 IN

## 2025-06-23 PROCEDURE — 99215 OFFICE O/P EST HI 40 MIN: CPT

## 2025-06-23 PROCEDURE — G2211 COMPLEX E/M VISIT ADD ON: CPT | Mod: NC

## 2025-06-23 RX ORDER — GABAPENTIN 300 MG/1
300 CAPSULE ORAL
Refills: 0 | Status: ACTIVE | COMMUNITY

## 2025-06-23 RX ORDER — LEVETIRACETAM 1000 MG/1
1000 TABLET, FILM COATED ORAL TWICE DAILY
Qty: 60 | Refills: 6 | Status: ACTIVE | COMMUNITY
Start: 2025-06-23 | End: 1900-01-01

## 2025-06-23 RX ORDER — LEVETIRACETAM 500 MG/1
500 TABLET, FILM COATED ORAL
Refills: 0 | Status: ACTIVE | COMMUNITY